# Patient Record
Sex: MALE | Race: WHITE | NOT HISPANIC OR LATINO | Employment: OTHER | URBAN - NONMETROPOLITAN AREA
[De-identification: names, ages, dates, MRNs, and addresses within clinical notes are randomized per-mention and may not be internally consistent; named-entity substitution may affect disease eponyms.]

---

## 2017-01-10 ENCOUNTER — HISTORY (OUTPATIENT)
Dept: FAMILY MEDICINE | Facility: OTHER | Age: 57
End: 2017-01-10

## 2017-01-10 ENCOUNTER — OFFICE VISIT - GICH (OUTPATIENT)
Dept: FAMILY MEDICINE | Facility: OTHER | Age: 57
End: 2017-01-10

## 2017-01-10 DIAGNOSIS — G62.9 POLYNEUROPATHY: ICD-10-CM

## 2017-01-10 DIAGNOSIS — R20.2 PARESTHESIA OF SKIN: ICD-10-CM

## 2017-01-20 ENCOUNTER — AMBULATORY - GICH (OUTPATIENT)
Dept: SCHEDULING | Facility: OTHER | Age: 57
End: 2017-01-20

## 2017-02-10 ENCOUNTER — OFFICE VISIT - GICH (OUTPATIENT)
Dept: FAMILY MEDICINE | Facility: OTHER | Age: 57
End: 2017-02-10

## 2017-02-10 ENCOUNTER — HISTORY (OUTPATIENT)
Dept: FAMILY MEDICINE | Facility: OTHER | Age: 57
End: 2017-02-10

## 2017-02-10 DIAGNOSIS — G62.9 POLYNEUROPATHY: ICD-10-CM

## 2017-03-03 ENCOUNTER — OFFICE VISIT - GICH (OUTPATIENT)
Dept: FAMILY MEDICINE | Facility: OTHER | Age: 57
End: 2017-03-03

## 2017-03-03 ENCOUNTER — HISTORY (OUTPATIENT)
Dept: FAMILY MEDICINE | Facility: OTHER | Age: 57
End: 2017-03-03

## 2017-03-03 DIAGNOSIS — R20.2 PARESTHESIA OF SKIN: ICD-10-CM

## 2017-04-05 ENCOUNTER — COMMUNICATION - GICH (OUTPATIENT)
Dept: FAMILY MEDICINE | Facility: OTHER | Age: 57
End: 2017-04-05

## 2017-04-05 DIAGNOSIS — K21.9 GASTRO-ESOPHAGEAL REFLUX DISEASE WITHOUT ESOPHAGITIS: ICD-10-CM

## 2017-05-11 ENCOUNTER — OFFICE VISIT - GICH (OUTPATIENT)
Dept: FAMILY MEDICINE | Facility: OTHER | Age: 57
End: 2017-05-11

## 2017-05-11 ENCOUNTER — HISTORY (OUTPATIENT)
Dept: FAMILY MEDICINE | Facility: OTHER | Age: 57
End: 2017-05-11

## 2017-05-11 DIAGNOSIS — G60.9 HEREDITARY AND IDIOPATHIC NEUROPATHY: ICD-10-CM

## 2017-05-11 DIAGNOSIS — R20.2 PARESTHESIA OF SKIN: ICD-10-CM

## 2017-05-18 ENCOUNTER — HISTORY (OUTPATIENT)
Dept: FAMILY MEDICINE | Facility: OTHER | Age: 57
End: 2017-05-18

## 2017-05-18 ENCOUNTER — AMBULATORY - GICH (OUTPATIENT)
Dept: FAMILY MEDICINE | Facility: OTHER | Age: 57
End: 2017-05-18

## 2017-05-18 DIAGNOSIS — L72.3 SEBACEOUS CYST: ICD-10-CM

## 2017-05-18 DIAGNOSIS — G62.9 POLYNEUROPATHY: ICD-10-CM

## 2017-05-18 DIAGNOSIS — S01.312A: ICD-10-CM

## 2017-05-19 LAB
6-MONOACETYL MORPHINE - HISTORICAL: ABNORMAL NG/ML
AMPHETAMINE URINE - HISTORICAL: ABNORMAL NG/ML
BARBITURATE URINE - HISTORICAL: ABNORMAL NG/ML
BENZODIAZEPINE URINE - HISTORICAL: ABNORMAL NG/ML
BUPRENORPHRINE URINE - HISTORICAL: ABNORMAL NG/ML
COCAINE METAB URINE - HISTORICAL: ABNORMAL NG/ML
CREAT UR - HISTORICAL: 111 MG/DL
ETHYLGLUCURONIDE URINE - HISTORICAL: ABNORMAL NG/ML
FENTANYL URINE - HISTORICAL: ABNORMAL NG/ML
MASS SPECTROMETRY URINE - HISTORICAL: ABNORMAL
METHADONE URINE - HISTORICAL: ABNORMAL NG/ML
OPIATES URINE - HISTORICAL: ABNORMAL NG/ML
OXYCODONE URINE - HISTORICAL: ABNORMAL NG/ML
PH URINE - HISTORICAL: 6.8
PROPOXYPHENE URINE - HISTORICAL: ABNORMAL NG/ML
THC 50 URINE - HISTORICAL: ABNORMAL NG/ML
TRAMADOL - HISTORICAL: ABNORMAL NG/ML

## 2017-05-30 ENCOUNTER — HISTORY (OUTPATIENT)
Dept: FAMILY MEDICINE | Facility: OTHER | Age: 57
End: 2017-05-30

## 2017-05-30 ENCOUNTER — OFFICE VISIT - GICH (OUTPATIENT)
Dept: FAMILY MEDICINE | Facility: OTHER | Age: 57
End: 2017-05-30

## 2017-05-30 DIAGNOSIS — L72.3 SEBACEOUS CYST: ICD-10-CM

## 2017-06-21 ENCOUNTER — OFFICE VISIT - GICH (OUTPATIENT)
Dept: FAMILY MEDICINE | Facility: OTHER | Age: 57
End: 2017-06-21

## 2017-06-21 ENCOUNTER — HISTORY (OUTPATIENT)
Dept: FAMILY MEDICINE | Facility: OTHER | Age: 57
End: 2017-06-21

## 2017-06-21 DIAGNOSIS — G60.9 HEREDITARY AND IDIOPATHIC NEUROPATHY: ICD-10-CM

## 2017-06-22 ENCOUNTER — HISTORY (OUTPATIENT)
Dept: FAMILY MEDICINE | Facility: OTHER | Age: 57
End: 2017-06-22

## 2017-06-22 ENCOUNTER — OFFICE VISIT - GICH (OUTPATIENT)
Dept: FAMILY MEDICINE | Facility: OTHER | Age: 57
End: 2017-06-22
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
## 2017-06-22 DIAGNOSIS — L21.9 SEBORRHEIC DERMATITIS: ICD-10-CM

## 2017-07-14 ENCOUNTER — AMBULATORY - GICH (OUTPATIENT)
Dept: FAMILY MEDICINE | Facility: OTHER | Age: 57
End: 2017-07-14

## 2017-07-14 DIAGNOSIS — L21.9 SEBORRHEIC DERMATITIS: ICD-10-CM

## 2017-07-31 ENCOUNTER — AMBULATORY - GICH (OUTPATIENT)
Dept: RADIOLOGY | Facility: OTHER | Age: 57
End: 2017-07-31

## 2017-07-31 DIAGNOSIS — G62.9 POLYNEUROPATHY: ICD-10-CM

## 2017-08-01 ENCOUNTER — HOSPITAL ENCOUNTER (OUTPATIENT)
Dept: RADIOLOGY | Facility: OTHER | Age: 57
End: 2017-08-01
Attending: FAMILY MEDICINE

## 2017-08-01 ENCOUNTER — AMBULATORY - GICH (OUTPATIENT)
Dept: RADIOLOGY | Facility: OTHER | Age: 57
End: 2017-08-01

## 2017-08-01 DIAGNOSIS — G62.9 POLYNEUROPATHY: ICD-10-CM

## 2017-08-04 ENCOUNTER — HISTORY (OUTPATIENT)
Dept: FAMILY MEDICINE | Facility: OTHER | Age: 57
End: 2017-08-04

## 2017-08-04 ENCOUNTER — OFFICE VISIT - GICH (OUTPATIENT)
Dept: FAMILY MEDICINE | Facility: OTHER | Age: 57
End: 2017-08-04

## 2017-08-04 DIAGNOSIS — R20.2 PARESTHESIA OF SKIN: ICD-10-CM

## 2017-08-04 DIAGNOSIS — G60.9 HEREDITARY AND IDIOPATHIC NEUROPATHY: ICD-10-CM

## 2017-08-04 DIAGNOSIS — D17.79: ICD-10-CM

## 2017-09-25 ENCOUNTER — HISTORY (OUTPATIENT)
Dept: FAMILY MEDICINE | Facility: OTHER | Age: 57
End: 2017-09-25

## 2017-09-25 ENCOUNTER — OFFICE VISIT - GICH (OUTPATIENT)
Dept: FAMILY MEDICINE | Facility: OTHER | Age: 57
End: 2017-09-25

## 2017-09-25 ENCOUNTER — AMBULATORY - GICH (OUTPATIENT)
Dept: SCHEDULING | Facility: OTHER | Age: 57
End: 2017-09-25

## 2017-09-25 DIAGNOSIS — G60.9 HEREDITARY AND IDIOPATHIC NEUROPATHY: ICD-10-CM

## 2017-10-04 ENCOUNTER — AMBULATORY - GICH (OUTPATIENT)
Dept: FAMILY MEDICINE | Facility: OTHER | Age: 57
End: 2017-10-04

## 2017-10-04 DIAGNOSIS — Z23 ENCOUNTER FOR IMMUNIZATION: ICD-10-CM

## 2017-10-17 ENCOUNTER — AMBULATORY - GICH (OUTPATIENT)
Dept: SCHEDULING | Facility: OTHER | Age: 57
End: 2017-10-17

## 2017-11-02 ENCOUNTER — HISTORY (OUTPATIENT)
Dept: FAMILY MEDICINE | Facility: OTHER | Age: 57
End: 2017-11-02

## 2017-11-02 ENCOUNTER — OFFICE VISIT - GICH (OUTPATIENT)
Dept: FAMILY MEDICINE | Facility: OTHER | Age: 57
End: 2017-11-02

## 2017-11-02 DIAGNOSIS — R20.2 PARESTHESIA OF SKIN: ICD-10-CM

## 2017-11-09 ENCOUNTER — AMBULATORY - GICH (OUTPATIENT)
Dept: SCHEDULING | Facility: OTHER | Age: 57
End: 2017-11-09

## 2017-11-13 LAB
6-MONOACETYL MORPHINE - HISTORICAL: ABNORMAL NG/ML
AMPHETAMINE URINE - HISTORICAL: ABNORMAL NG/ML
BARBITURATE URINE - HISTORICAL: ABNORMAL NG/ML
BENZODIAZEPINE URINE - HISTORICAL: ABNORMAL NG/ML
BUPRENORPHRINE URINE - HISTORICAL: ABNORMAL NG/ML
COCAINE METAB URINE - HISTORICAL: ABNORMAL NG/ML
CREAT UR - HISTORICAL: 139 MG/DL
ETHYLGLUCURONIDE URINE - HISTORICAL: ABNORMAL NG/ML
FENTANYL URINE - HISTORICAL: ABNORMAL NG/ML
MASS SPECTROMETRY URINE - HISTORICAL: ABNORMAL
METHADONE URINE - HISTORICAL: ABNORMAL NG/ML
OPIATES URINE - HISTORICAL: ABNORMAL NG/ML
OXYCODONE URINE - HISTORICAL: ABNORMAL NG/ML
PH URINE - HISTORICAL: 7.4
PROPOXYPHENE URINE - HISTORICAL: ABNORMAL NG/ML
THC 50 URINE - HISTORICAL: ABNORMAL NG/ML
TRAMADOL - HISTORICAL: ABNORMAL NG/ML

## 2017-12-08 ENCOUNTER — AMBULATORY - GICH (OUTPATIENT)
Dept: PHYSICAL THERAPY | Facility: OTHER | Age: 57
End: 2017-12-08

## 2017-12-08 DIAGNOSIS — G90.09 OTHER IDIOPATHIC PERIPHERAL AUTONOMIC NEUROPATHY: ICD-10-CM

## 2017-12-12 ENCOUNTER — OFFICE VISIT - GICH (OUTPATIENT)
Dept: FAMILY MEDICINE | Facility: OTHER | Age: 57
End: 2017-12-12

## 2017-12-12 ENCOUNTER — HISTORY (OUTPATIENT)
Dept: FAMILY MEDICINE | Facility: OTHER | Age: 57
End: 2017-12-12

## 2017-12-12 DIAGNOSIS — G60.9 HEREDITARY AND IDIOPATHIC NEUROPATHY: ICD-10-CM

## 2017-12-15 ENCOUNTER — HOSPITAL ENCOUNTER (OUTPATIENT)
Dept: PHYSICAL THERAPY | Facility: OTHER | Age: 57
Setting detail: THERAPIES SERIES
End: 2017-12-15

## 2017-12-15 DIAGNOSIS — G90.09 OTHER IDIOPATHIC PERIPHERAL AUTONOMIC NEUROPATHY: ICD-10-CM

## 2017-12-20 ENCOUNTER — HOSPITAL ENCOUNTER (OUTPATIENT)
Dept: PHYSICAL THERAPY | Facility: OTHER | Age: 57
Setting detail: THERAPIES SERIES
End: 2017-12-20

## 2017-12-27 NOTE — PROGRESS NOTES
"Patient Information     Patient Name MRN Sex Regulo Velasquez [id number] Male 1960      Progress Notes by Kaiden Chavez MD at 2017  9:15 AM     Author:  Kaiden Chavez MD Service:  (none) Author Type:  Physician     Filed:  2017  9:46 AM Encounter Date:  2017 Status:  Signed     :  Kaiden Chavez MD (Physician)            SUBJECTIVE:    Regulo Roth is a 57 y.o. male who presents for follow up neuropathy    HPI    Feet are \"horrible\" and seem to be getting worse.  He is wondering if I can write a letter stating he cannot stand on the concrete at work.  With getting dressed at his locker, he starts to have pain and before he walks 500 feet to his work site he has trouble walking.  Pain medications wear off by about 9:30 in the morning.  Has met with several neurologists, completed a pain program and is maintained on narcotics.  Has failed neutrontin, gabapentin and TCAs, topical treatment like capscacin and lidoderm patches.  Failed medical THC.  Had a spinal injection about 5 weeks ago, no help at all.  Sleeps only an hour or so at a time at night most of the time.  Applies ice packs occupational therapy the feet.  Often even having the sheets touching his feet will wake him.  Burning type pain.  Hoping to do long term disability.  Has been on a limit of no walking or standing for over 1/2 hour at a time so far.  This has been over 6 months and is not adequate. He is now starting to trip and has a fear of falling at work into a dangerous situation (is around exposed voltage as an ).    No Known Allergies,   Current Outpatient Prescriptions on File Prior to Visit       Medication  Sig Dispense Refill     albuterol HFA (PROAIR HFA) 90 mcg/actuation inhaler Inhale 2 Puffs by mouth every 4 hours if needed. 3 Inhaler 2     aspirin 81 mg tablet Take 1 tablet by mouth once daily with a meal.  0     Calcium carbonate (OYSTERSHELL CALCIUM) 500 mg tablet Take 1 " tablet by mouth once daily.       cholecalciferol (VITAMIN D) 1,000 unit tablet Take 1 tablet by mouth once daily.       CPAP CPAP, heated humidifier, mask, headgear, filters and tubing. For home use. Pressure:  8   Length of Need: 1 unit 0     doxycycline (VIBRAMYCIN) 100 mg capsule 2 cap once 2 capsule 0     esomeprazole (NEXIUM) 40 mg capsule TAKE 1 CAPSULE BY MOUTH DAILY BEFORE A MEAL 90 capsule 3     fluocinonide 0.05 TOPICAL (LIDEX) 0.05 % external solution Apply  topically to affected area(s) 2 times daily. 60 mL 3     fluticasone (50 mcg per actuation) nasal solution (FLONASE) Inhale 2 Sprays into both nostrils once daily. 1 Bottle 12     mometasone 0.1% (ELOCON 0.1% OINTMENT) 0.1 % ointment Apply  topically to affected area(s) once daily. 45 g 3     omega-3 fatty acids-vitamin E (FISH OIL) 1,000 mg cap Take 1 capsule by mouth.  0     oxyCODONE-acetaminophen, 5-325 mg, (PERCOCET) 5-325 mg per tablet Take 1-2 tablets by mouth every 6 hours if needed  for Pain Max acetaminophen dose: 4000mg in 24 hrs. To fill after 10/08/17 120 tablet 0     No current facility-administered medications on file prior to visit.    ,   Past Medical History:     Diagnosis  Date     Asthma      Chronic eczema      GERD (gastroesophageal reflux disease)      Osteoarthritis, knee      Seasonal allergies     and   Past Surgical History:      Procedure  Laterality Date     CERVICAL FUSION      c6-7       COLONOSCOPY DIAGNOSTIC  12/9/13    F/U 2023       DEBRIDEMENT Left 2012    knee  infection following surgery       ESOPHAGOGASTRODUODENOSCOPY  12/9/13    EGD       HERNIA REPAIR       HERNIA REPAIR       KNEE ARTHROSCOPY  1981    acl and medial meniscus repair [Other]       KNEE REPLACEMENT  August 2012    left - became infected require debridment       MULTIPLE SLEEP LATENCY TEST WITHOUT CPAP      uses C PAP       [place] KNEE MANIPULATION  2012     TONSIL AND ADENOIDECTOMY         REVIEW OF SYSTEMS:  Review of Systems   Constitutional:  Negative for chills, fever and weight loss.   Musculoskeletal: Negative for joint pain.   Neurological: Positive for tingling.       OBJECTIVE:  /76  Resp 16    EXAM:   Physical Exam    ASSESSMENT/PLAN:    ICD-10-CM    1. Chronic idiopathic axonal polyneuropathy G60.9 AMB CONSULT TO NEUROLOGY        Plan:  He is progressing in the severity and duration (near constant) of the symptoms.  We have really pursued all known treatment options for this.  Is now also getting more unstable on his feet, so given the fear of falling his current job is now dangerous for him.  I filled out his paperwork stating such.  He has the name of an MD in [facility], Dr. Nieto, who is doing a research protocol in dorsal root ganglion stimulation.  Patient wants to pursue this next, will arrange.  Follow up with me in 5 weeks for narcotic refills.  15/15 minutes counseling.    Kaiden Chavez MD ....................  9/25/2017   9:41 AM

## 2017-12-27 NOTE — PROGRESS NOTES
Patient Information     Patient Name MRN Regulo Davis [id number] Male 1960      Progress Notes by Kaiden Chavez MD at 2017  1:15 PM     Author:  Kaiden Chavez MD Service:  (none) Author Type:  Physician     Filed:  11/3/2017  1:00 PM Encounter Date:  2017 Status:  Signed     :  Kaiden Chavez MD (Physician)            Patient was seen and examined by me as well as Lucretia Roland, MS 3.  See her note for more details.    Kaiden Chavez MD ....................  11/3/2017   12:59 PM

## 2017-12-27 NOTE — PROGRESS NOTES
Patient Information     Patient Name MRN Sex Regulo Velasquez [id number] Male 1960      Progress Notes by Kaiden Chavez MD at 2017 10:30 AM     Author:  Kaiden Chavez MD Service:  (none) Author Type:  Physician     Filed:  2017 10:42 AM Encounter Date:  2017 Status:  Signed     :  Kaiden Chavez MD (Physician)            SUBJECTIVE:    Regulo Roth is a 56 y.o. male who presents for tick bite    HPI    Last night in the shower he found an engorged deer tick on the left scrotum.  Was able to remove it with mouth intact.  No fevers, joint pains and minimal rash in the area.    Also wants a refill on his temovate solution.  Uses this on scalp perhaps 2-3 times a year, resolves the symptoms in a few days.    No Known Allergies,   Current Outpatient Prescriptions on File Prior to Visit       Medication  Sig Dispense Refill     albuterol HFA (PROAIR HFA) 90 mcg/actuation inhaler Inhale 2 Puffs by mouth every 4 hours if needed. 3 Inhaler 2     aspirin 81 mg tablet Take 1 tablet by mouth once daily with a meal.  0     Calcium carbonate (OYSTERSHELL CALCIUM) 500 mg tablet Take 1 tablet by mouth once daily.       cholecalciferol (VITAMIN D) 1,000 unit tablet Take 1 tablet by mouth once daily.       CPAP CPAP, heated humidifier, mask, headgear, filters and tubing. For home use. Pressure:  8   Length of Need: 1 unit 0     esomeprazole (NEXIUM) 40 mg capsule TAKE 1 CAPSULE BY MOUTH DAILY BEFORE A MEAL 90 capsule 3     fluticasone (50 mcg per actuation) nasal solution (FLONASE) Inhale 2 Sprays into both nostrils once daily. 1 Bottle 12     mometasone 0.1% (ELOCON 0.1% OINTMENT) 0.1 % ointment Apply  topically to affected area(s) once daily. 45 g 3     omega-3 fatty acids-vitamin E (FISH OIL) 1,000 mg cap Take 1 capsule by mouth.  0     oxyCODONE-acetaminophen, 5-325 mg, (PERCOCET) 5-325 mg per tablet Take 1-2 tablets by mouth every 6 hours if needed  for Pain Max acetaminophen  dose: 4000mg in 24 hrs. To fill after 7/10/17 120 tablet 0     No current facility-administered medications on file prior to visit.    ,   Past Medical History:     Diagnosis  Date     Asthma      Chronic eczema      GERD (gastroesophageal reflux disease)      Osteoarthritis, knee      Seasonal allergies     and   Past Surgical History:      Procedure  Laterality Date     CERVICAL FUSION      c6-7       COLONOSCOPY DIAGNOSTIC  12/9/13    F/U 2023       DEBRIDEMENT Left 2012    knee  infection following surgery       ESOPHAGOGASTRODUODENOSCOPY  12/9/13    EGD       HERNIA REPAIR       HERNIA REPAIR       KNEE ARTHROSCOPY  1981    acl and medial meniscus repair [Other]       KNEE REPLACEMENT  August 2012    left - became infected require debridment       MULTIPLE SLEEP LATENCY TEST WITHOUT CPAP      uses C PAP       [place] KNEE MANIPULATION  2012     TONSIL AND ADENOIDECTOMY         REVIEW OF SYSTEMS:  Review of Systems   Constitutional: Negative for chills and fever.   Musculoskeletal: Negative for joint pain.   Neurological: Negative for headaches.       OBJECTIVE:  /70  Resp 16    EXAM:   Physical Exam   Constitutional: He is oriented to person, place, and time and well-developed, well-nourished, and in no distress. No distress.   Neurological: He is alert and oriented to person, place, and time.   Skin: He is not diaphoretic.   small red patch, about 1 cm, on left scrotum, no target lesion       ASSESSMENT/PLAN:    ICD-10-CM    1. Tick bite, initial encounter W57.XXXA doxycycline (VIBRAMYCIN) 100 mg capsule   2. Seborrheic dermatitis L21.9 clobetasol 0.05% TOPICAL (TEMOVATE) 0.05 % external solution        Plan:  The tick is with him and is indeed a deer tick and engorged.  Since has no lyme symptoms, can do the 2 caps of doxycycline, but if getting joint pains or fevers, then would have him call for a 3 week course.  Refilled the temovate.    Kaiden Chavez MD ....................  6/22/2017   10:41 AM

## 2017-12-27 NOTE — PROGRESS NOTES
Patient Information     Patient Name MRN Sex Regulo Velasquez [id number] Male 1960      Progress Notes by Julissa Ram at 2017  3:21 PM     Author:  Julissa Ram Service:  (none) Author Type:  (none)     Filed:  2017  3:21 PM Date of Service:  2017  3:21 PM Status:  Signed     :  Julissa Ram            [facility] Protocol    A. Pre-procedure verification complete yes  1-relevant information / documentation available, reviewed and properly matched to the patient; 2-consent accurate and complete, 3-equipment and supplies available    B. Site marking complete Yes  Site marked if not in continuous attendance with patient    C. TIME OUT completed yes  Time Out was conducted just prior to starting procedure to verify the eight required elements: 1-patient identity, 2-consent accurate and complete, 3-position, 4-correct side/site marked (if applicable), 5-procedure, 6-relevant images / results properly labeled and displayed (if applicable), 7-antibiotics / irrigation fluids (if applicable), 8-safety precautions.

## 2017-12-27 NOTE — PROGRESS NOTES
Patient Information     Patient Name MRN Sex Regulo Velasquez [id number] Male 1960      Progress Notes by Julissa Ram at 2017  3:21 PM     Author:  Julissa Ram Service:  (none) Author Type:  (none)     Filed:  2017  3:21 PM Date of Service:  2017  3:21 PM Status:  Signed     :  Julissa Ram            Falls Risk Criteria:    Age 65 and older or under age 4        Sensory deficits    Poor vision    Use of ambulatory aides    Impaired judgment    Unable to walk independently    Meets High Risk criteria for falls:  no

## 2017-12-27 NOTE — PROGRESS NOTES
Patient Information     Patient Name MRN Sex Regulo Velasquez [id number] Male 1960      Progress Notes by Kaiden Chavez MD at 2017  3:00 PM     Author:  Kaiden Chavez MD Service:  (none) Author Type:  Physician     Filed:  2017  3:38 PM Encounter Date:  2017 Status:  Signed     :  aKiden Chavez MD (Physician)            SUBJECTIVE:    Regulo Roth is a 57 y.o. male who presents for follow up pain medications     HPI    He just had spine injections after a spine MRI of lumbar [place].  Says he has a little less burning in the soles of his feet.  No improvement in pains with walking on the floor.  Says this is just a mild improvement.  Keeps socks in the freezer, puts them on at night for 20 minutes to help with the pains.  Has no pains in his back.  I reviewed the MRI with him.    IMPRESSION:     Thoracolumbar Scheuermann's disease.      Epidural lipomatosis, resulting in essentially complete CSF effacement below the level of L1-2.      Diffuse degenerative changes as detailed above. No severe bony spinal stenosis. Variable foraminal narrowing, most pronounced at L3-4.     Electronically Signed By: oNel Roberson on 2017 2:40 PM    Is due for a refill on the narcotics.      No Known Allergies,   Current Outpatient Prescriptions on File Prior to Visit       Medication  Sig Dispense Refill     albuterol HFA (PROAIR HFA) 90 mcg/actuation inhaler Inhale 2 Puffs by mouth every 4 hours if needed. 3 Inhaler 2     aspirin 81 mg tablet Take 1 tablet by mouth once daily with a meal.  0     Calcium carbonate (OYSTERSHELL CALCIUM) 500 mg tablet Take 1 tablet by mouth once daily.       cholecalciferol (VITAMIN D) 1,000 unit tablet Take 1 tablet by mouth once daily.       CPAP CPAP, heated humidifier, mask, headgear, filters and tubing. For home use. Pressure:  8   Length of Need: 1 unit 0     doxycycline (VIBRAMYCIN) 100 mg capsule 2 cap once 2 capsule 0     esomeprazole (NEXIUM) 40  mg capsule TAKE 1 CAPSULE BY MOUTH DAILY BEFORE A MEAL 90 capsule 3     fluocinonide 0.05 TOPICAL (LIDEX) 0.05 % external solution Apply  topically to affected area(s) 2 times daily. 60 mL 3     fluticasone (50 mcg per actuation) nasal solution (FLONASE) Inhale 2 Sprays into both nostrils once daily. 1 Bottle 12     mometasone 0.1% (ELOCON 0.1% OINTMENT) 0.1 % ointment Apply  topically to affected area(s) once daily. 45 g 3     omega-3 fatty acids-vitamin E (FISH OIL) 1,000 mg cap Take 1 capsule by mouth.  0     No current facility-administered medications on file prior to visit.    ,   Past Medical History:     Diagnosis  Date     Asthma      Chronic eczema      GERD (gastroesophageal reflux disease)      Osteoarthritis, knee      Seasonal allergies     and   Past Surgical History:      Procedure  Laterality Date     CERVICAL FUSION      c6-7       COLONOSCOPY DIAGNOSTIC  12/9/13    F/U 2023       DEBRIDEMENT Left 2012    knee  infection following surgery       ESOPHAGOGASTRODUODENOSCOPY  12/9/13    EGD       HERNIA REPAIR       HERNIA REPAIR       KNEE ARTHROSCOPY  1981    acl and medial meniscus repair [Other]       KNEE REPLACEMENT  August 2012    left - became infected require debridment       MULTIPLE SLEEP LATENCY TEST WITHOUT CPAP      uses C PAP       [place] KNEE MANIPULATION  2012     TONSIL AND ADENOIDECTOMY         REVIEW OF SYSTEMS:  Review of Systems   Constitutional: Negative for weight loss.   Musculoskeletal: Negative for back pain.   Skin: Negative for itching and rash.   Neurological: Positive for tingling. Negative for headaches.   Psychiatric/Behavioral: Negative for substance abuse.       OBJECTIVE:  /70  Pulse 62  Temp 97.8  F (36.6  C) (Tympanic)  Wt 135.2 kg (298 lb)  BMI 42.76 kg/m2    EXAM:   Physical Exam   Constitutional: He is oriented to person, place, and time and well-developed, well-nourished, and in no distress. No distress.   HENT:   Head: Normocephalic and atraumatic.    Musculoskeletal:   No lumbar pain on palpation.  Neg straight leg raise.   Neurological: He is alert and oriented to person, place, and time.   Skin: Skin is warm and dry. He is not diaphoretic.   Psychiatric: Memory, affect and judgment normal.       ASSESSMENT/PLAN:    ICD-10-CM    1. Chronic idiopathic axonal polyneuropathy G60.9    2. Paresthesia of bilateral legs R20.2 oxyCODONE-acetaminophen, 5-325 mg, (PERCOCET) 5-325 mg per tablet      DISCONTINUED: oxyCODONE-acetaminophen, 5-325 mg, (PERCOCET) 5-325 mg per tablet      DISCONTINUED: oxyCODONE-acetaminophen, 5-325 mg, (PERCOCET) 5-325 mg per tablet   3. Epidural lipomatosis D17.79         Plan:   reviewed and stable.  Tox screen done in May was stable.  The lumbar findings are not likely to cause true polyneuropathy, but might be a contributing factor.  It is still not a clearly successful injection.  20/25 minutes spent counseling and reviewing his scan with him.    Kaiden Chavez MD ....................  8/4/2017   3:38 PM

## 2017-12-27 NOTE — PROGRESS NOTES
"Patient Information     Patient Name MRN Sex Regulo Velasquez [id number] Male 1960      Progress Notes by Kaiden Chavez MD at 2017  9:30 AM     Author:  Kaiden Chavez MD Service:  (none) Author Type:  Physician     Filed:  2017  9:53 AM Encounter Date:  2017 Status:  Signed     :  Kaiden Chavez MD (Physician)            SUBJECTIVE:    Regulo Roth is a 56 y.o. male who presents for follow up foot neurpoathy    HPI    I have filled out forms for reduced activities.  1/2 hour restirciont of standing or walking, then followed by rest.  Pain remains variable.  Seems to improve with the oxycodone.  Pains get much worse for the first few hours at night before he falls asleep.  Being on his feet makes it all worse.  Pain is in the bottoms of both feet at this time.  More intense than 1 year ago.  Feels \"like walking on hard sharp rocks\".  At night lots of burning pains, worse with sheets touching them or even air blowing on them.  Has had several Neurology consults, most recently at the [facility],  as well as several EMGs showing idiopathic distal axonal polyneuropathy.  Has failed tricyclics, neruontin, lyrica, pain clinic consult.  Serial labs for things like hypothyroidism, B12 deficiency have been normal.  Vit D replacement has not improved the symptoms.    No Known Allergies,   Current Outpatient Prescriptions on File Prior to Visit       Medication  Sig Dispense Refill     albuterol HFA (PROAIR HFA) 90 mcg/actuation inhaler Inhale 2 Puffs by mouth every 4 hours if needed. 3 Inhaler 2     aspirin 81 mg tablet Take 1 tablet by mouth once daily with a meal.  0     Calcium carbonate (OYSTERSHELL CALCIUM) 500 mg tablet Take 1 tablet by mouth once daily.       cholecalciferol (VITAMIN D) 1,000 unit tablet Take 1 tablet by mouth once daily.       clobetasol 0.05% TOPICAL (TEMOVATE) 0.05 % external solution Apply 1 mL topically to affected area(s) 2 times daily. 1 Bottle 3 "     CPAP CPAP, heated humidifier, mask, headgear, filters and tubing. For home use. Pressure:  8   Length of Need: 1 unit 0     esomeprazole (NEXIUM) 40 mg capsule TAKE 1 CAPSULE BY MOUTH DAILY BEFORE A MEAL 90 capsule 3     fluticasone (50 mcg per actuation) nasal solution (FLONASE) Inhale 2 Sprays into both nostrils once daily. 1 Bottle 12     mometasone 0.1% (ELOCON 0.1% OINTMENT) 0.1 % ointment Apply  topically to affected area(s) once daily. 45 g 3     omega-3 fatty acids-vitamin E (FISH OIL) 1,000 mg cap Take 1 capsule by mouth.  0     oxyCODONE-acetaminophen, 5-325 mg, (PERCOCET) 5-325 mg per tablet Take 1-2 tablets by mouth every 6 hours if needed  for Pain Max acetaminophen dose: 4000mg in 24 hrs. To fill after 7/10/17 120 tablet 0     No current facility-administered medications on file prior to visit.    ,   Past Medical History:     Diagnosis  Date     Asthma      Chronic eczema      GERD (gastroesophageal reflux disease)      Osteoarthritis, knee      Seasonal allergies     and   Past Surgical History:      Procedure  Laterality Date     CERVICAL FUSION      c6-7       COLONOSCOPY DIAGNOSTIC  12/9/13    F/U 2023       DEBRIDEMENT Left 2012    knee  infection following surgery       ESOPHAGOGASTRODUODENOSCOPY  12/9/13    EGD       HERNIA REPAIR       HERNIA REPAIR       KNEE ARTHROSCOPY  1981    acl and medial meniscus repair [Other]       KNEE REPLACEMENT  August 2012    left - became infected require debridment       MULTIPLE SLEEP LATENCY TEST WITHOUT CPAP      uses C PAP       [place] KNEE MANIPULATION  2012     TONSIL AND ADENOIDECTOMY         REVIEW OF SYSTEMS:  Review of Systems   Musculoskeletal: Negative for joint pain.   Neurological: Positive for tingling.   Psychiatric/Behavioral: Negative for depression.       OBJECTIVE:  /70  Pulse 64  Resp 16    EXAM:   Physical Exam   Constitutional: He is oriented to person, place, and time and well-developed, well-nourished, and in no distress. No  distress.   Neurological: He is alert and oriented to person, place, and time.   Skin: Skin is warm and dry. He is not diaphoretic.   Psychiatric: Memory, affect and judgment normal.       ASSESSMENT/PLAN:    ICD-10-CM    1. Chronic idiopathic axonal polyneuropathy G60.9         Plan:  This is progressing, which is frustrating for him as well as me in that we have failed to pinpoint the cause.  I continue to treat his symptoms with the above medications, per the recommendations of the neurologists as well as the pain consultant.  The only avenue left would be a [facility] consult or waiting for medical advancements and revisiting the work up in the future.  Realistically, he can no longer stand for more than 1/2 hour at a time.  Then would need to rest for approximately 1/2 hour or so.  20/25 minutes counseling today.    Kaiden Chavez MD ....................  6/21/2017   9:51 AM

## 2017-12-28 NOTE — PROGRESS NOTES
Patient Information     Patient Name MRN Regulo Davis [id number] Male 1960      Progress Notes by Lucretia Roland Med Student at 2017  1:15 PM     Author:  Lucretia Roland Med Student Service:  (none) Author Type:  PHYS- Medical Student     Filed:  11/3/2017  1:00 PM Encounter Date:  2017 Status:  Signed     :  Kaiden Chavez MD (Physician)            SUBJECTIVE:    Regulo Roth is a 57 y.o. male who presents for medication check    HPI    Regulo Roth is a pleasant 57 y.o. Male with a history of chronic idiopathic axonal polyneuropathy diagnosed with EMG who presents for a refill of his percocet. He has tried many different treatments for his neuropathy including medications, laser treatment, pain clinics, and is currently doing spinal injections. His feet are both affected equally causing a burning sensation when walking or sleeping. He does not have weakness associated with the pain and no history of falls.  He is interested in a dorsal root ganglion implant down in [place]. He states the medication has been helping somewhat but nothing seems to be effective at pain relief.     No Known Allergies,   Family History       Problem   Relation Age of Onset     Other  Father      Alzheimer's       Cancer  Sister      Leukemia     ,   Current Outpatient Prescriptions on File Prior to Visit       Medication  Sig Dispense Refill     albuterol HFA (PROAIR HFA) 90 mcg/actuation inhaler Inhale 2 Puffs by mouth every 4 hours if needed. 3 Inhaler 2     aspirin 81 mg tablet Take 1 tablet by mouth once daily with a meal.  0     Calcium carbonate (OYSTERSHELL CALCIUM) 500 mg tablet Take 1 tablet by mouth once daily.       cholecalciferol (VITAMIN D) 1,000 unit tablet Take 1 tablet by mouth once daily.       CPAP CPAP, heated humidifier, mask, headgear, filters and tubing. For home use. Pressure:  8   Length of Need: 1 unit 0     doxycycline (VIBRAMYCIN) 100 mg capsule 2  "cap once 2 capsule 0     esomeprazole (NEXIUM) 40 mg capsule TAKE 1 CAPSULE BY MOUTH DAILY BEFORE A MEAL 90 capsule 3     fluocinonide 0.05 TOPICAL (LIDEX) 0.05 % external solution Apply  topically to affected area(s) 2 times daily. 60 mL 3     fluticasone (50 mcg per actuation) nasal solution (FLONASE) Inhale 2 Sprays into both nostrils once daily. 1 Bottle 12     mometasone 0.1% (ELOCON 0.1% OINTMENT) 0.1 % ointment Apply  topically to affected area(s) once daily. 45 g 3     omega-3 fatty acids-vitamin E (FISH OIL) 1,000 mg cap Take 1 capsule by mouth.  0     No current facility-administered medications on file prior to visit.    ,   Past Medical History:     Diagnosis  Date     Asthma      Chronic eczema      GERD (gastroesophageal reflux disease)      Osteoarthritis, knee      Seasonal allergies     and   Past Surgical History:      Procedure  Laterality Date     CERVICAL FUSION      c6-7       COLONOSCOPY DIAGNOSTIC  12/9/13    F/U 2023       DEBRIDEMENT Left 2012    knee  infection following surgery       ESOPHAGOGASTRODUODENOSCOPY  12/9/13    EGD       HERNIA REPAIR       HERNIA REPAIR       KNEE ARTHROSCOPY  1981    acl and medial meniscus repair [Other]       KNEE REPLACEMENT  August 2012    left - became infected require debridment       MULTIPLE SLEEP LATENCY TEST WITHOUT CPAP      uses C PAP       NM KNEE MANIPULATION  2012     TONSIL AND ADENOIDECTOMY         REVIEW OF SYSTEMS:  Review of Systems   Constitutional: Negative for chills and fever.   Musculoskeletal: Negative for falls.   Neurological: Positive for tingling and sensory change. Negative for focal weakness.       OBJECTIVE:  /70  Pulse 68  Resp 16  Ht 1.778 m (5' 10\")  Wt 136.1 kg (300 lb)  BMI 43.05 kg/m2    EXAM:   Physical Exam   Constitutional: He is oriented to person, place, and time and well-developed, well-nourished, and in no distress. No distress.   Musculoskeletal: Normal range of motion. He exhibits no edema or " tenderness.   Strength is 5/5 bilaterally for lower extremity   Neurological: He is alert and oriented to person, place, and time. Gait normal.   Skin: He is not diaphoretic.       ASSESSMENT/PLAN:    ICD-10-CM    1. Paresthesia of bilateral legs R20.2 oxyCODONE-acetaminophen, 5-325 mg, (PERCOCET) 5-325 mg per tablet      DRUG SCREEN - PAIN MANAGEMENT - TOXASSURE      AMB CONSULT TO PHYSICAL THERAPY      DRUG SCREEN - PAIN MANAGEMENT - TOXASSURE      DISCONTINUED: oxyCODONE-acetaminophen, 5-325 mg, (PERCOCET) 5-325 mg per tablet      DISCONTINUED: oxyCODONE-acetaminophen, 5-325 mg, (PERCOCET) 5-325 mg per tablet        Regulo Roth is a pleasant 57 y.o. Male with a history of chronic idiopathic axonal polyneuropathy diagnosed by EMG who presents for a medication refill of percocet. The pain has been getting worse and he is hoping to find success with a dorsal root ganglion implant.     Plan:    1. Polyneuropathy: Another 3 month prescription for percocet was renewed. Supported his decision to try the new therapy since he has tried every other option available. Also put in a consult for physical therapy.     2. Narcotic use: Compliance urine drug screen today.     Lucretia Roland, Med Student ....................  11/2/2017   3:14 PM        Kaiden Chavez MD ....................  11/3/2017   1:00 PM

## 2017-12-28 NOTE — PROGRESS NOTES
Patient Information     Patient Name MRN Sex Regulo Velasquez [id number] Male 1960      Progress Notes by Josefina Goncalves at 2017  1:50 PM     Author:  Josefina Goncalves Service:  (none) Author Type:  Other Clinical Staff     Filed:  2017  1:51 PM Date of Service:  2017  1:50 PM Status:  Signed     :  Josefina Goncalves (Other Clinical Staff)            Falls Risk Criteria:    Age 65 and older or under age 4        Sensory deficits    Poor vision    Use of ambulatory aides    Impaired judgment    Unable to walk independently    Meets High Risk criteria for falls:  no

## 2017-12-28 NOTE — PROGRESS NOTES
Patient Information     Patient Name MRN Regulo Davis [id number] Male 1960      Progress Notes by Julissa Ram at 2017  3:21 PM     Author:  Julissa Ram Service:  (none) Author Type:  (none)     Filed:  2017  3:21 PM Date of Service:  2017  3:21 PM Status:  Signed     :  Julissa Ram            RECOVERY TIME  Some numbness may be present 2-4 hours after the injection, which could impair your normal driving, reflexes.  You will need someone to drive you home from your exam due to the effects of certain medications.    You may experience numbness and/or relief of your pain for up to 4-6 hours after the injection.  Your usual symptoms may return the night of the procedure and may possible be more severe than usual a day or two following.  Please keep track of your pain over the next several days and report how long the relief lasts to the doctor who referred you for this procedure.    The beneficial effects of the steroids usually require 2 to 3 days to take effect, buy may take as long as 5 to 7 days.  If there is no change in the pain, then investigation can be focused on other possible sources of your pain.  In either case, the information is useful to the doctor who referred you for this procedure.    POSSIBLE SIDE EFFECTS  Facial flushing (redness), occasional low grade fevers of 99.5F or less, hiccups, insomnia, headaches, increased heart rate, abdominal cramping, and/or a bloating feeling are side effects of the steroid medications and will go away 3 to 4 days after the injection.    Diabetic Patients  The steroids you have received may significantly increase your blood sugar levels.  Monitor your blood sugar level closely (4-6 times per day) for a period of 4 days or until your blood sugar level normalizes.  If your blood sugar level elevates significantly or you experience confusion, dizziness, sweating, please notify our primary physician and make him/her aware  that you have received steroids.

## 2017-12-30 NOTE — NURSING NOTE
Patient Information     Patient Name MRN Regulo Davis [id number] Male 1960      Nursing Note by Jayashree Aguilar at 2017 10:30 AM     Author:  Jayashree Aguilar Service:  (none) Author Type:  (none)     Filed:  2017 10:32 AM Encounter Date:  2017 Status:  Signed     :  Jayashree Aguilar            Coming in for a tick bite. Was on for awhijason Aguilar ....................  2017   10:19 AM

## 2017-12-30 NOTE — NURSING NOTE
Patient Information     Patient Name MRN Sex Regulo Velasquez [id number] Male 1960      Nursing Note by Jayashree Aguilar at 2017  9:30 AM     Author:  Jayashree Aguilar Service:  (none) Author Type:  (none)     Filed:  2017  9:35 AM Encounter Date:  2017 Status:  Signed     :  Jayashree Aguilar             wanting medical records regarding Patients condition  Jayashree Aguilar ....................  2017   9:28 AM

## 2017-12-30 NOTE — NURSING NOTE
Patient Information     Patient Name MRN Regulo Davis [id number] Male 1960      Nursing Note by Jayashree Aguilar at 2017  9:15 AM     Author:  Jayashree Aguilar Service:  (none) Author Type:  (none)     Filed:  2017  9:28 AM Encounter Date:  2017 Status:  Signed     :  Jayashree Aguilar            Coming in for paper for work-feet  Jayashree Aguilar ....................  2017   9:22 AM

## 2017-12-30 NOTE — NURSING NOTE
Patient Information     Patient Name MRN Sex Regulo Velasquez [id number] Male 1960      Nursing Note by Jayashree Aguilar at 2017  1:15 PM     Author:  Jayashree Aguilar Service:  (none) Author Type:  (none)     Filed:  2017  1:08 PM Encounter Date:  2017 Status:  Signed     :  Jayashree Aguilar            Coming in for a medication check and referral PT  Jayashree Aguilar ....................  2017   12:58 PM

## 2017-12-30 NOTE — NURSING NOTE
Patient Information     Patient Name MRN Regulo Davis [id number] Male 1960      Nursing Note by Ashley Iniguez at 2017  3:00 PM     Author:  Ashley Iniguez Service:  (none) Author Type:  (none)     Filed:  2017  3:18 PM Encounter Date:  2017 Status:  Signed     :  Ashley Iniguez            Patient is here for a renewal of medication.  Ashley Iniguez LPN 2017 3:03 PM

## 2018-01-02 NOTE — PROGRESS NOTES
Patient Information     Patient Name MRN Sex Regulo Velasquez [id number] Male 1960      Progress Notes by Kaiden Chavez MD at 1/10/2017  3:30 PM     Author:  Kaiden Chavez MD Service:  (none) Author Type:  Physician     Filed:  1/10/2017  3:54 PM Encounter Date:  1/10/2017 Status:  Signed     :  Kaiden Chavez MD (Physician)            SUBJECTIVE:    Regulo Roth is a 56 y.o. male who presents for foot pains    HPI    The last 2 days have been severe.  Having cramps into the arches and has a hard time standing on them now.  Let work earlier.  Had met with another neurology at the [facility], who suggested trying Botox injections a another neurologist.  He has not heard back yet form them.  But basically this remains idiopathic.  He had tired medical marijuana, which only makes him sleepy.  Does not affect the pain at all.  Also has a hard time affording it.    No Known Allergies,   Current Outpatient Prescriptions on File Prior to Visit       Medication  Sig Dispense Refill     albuterol HFA (PROAIR HFA) 90 mcg/actuation inhaler Inhale 2 Puffs by mouth every 4 hours if needed. 3 Inhaler 2     aspirin 81 mg tablet Take 1 tablet by mouth once daily with a meal.  0     Calcium carbonate (OYSTERSHELL CALCIUM) 500 mg tablet Take 1 tablet by mouth once daily.       cholecalciferol (VITAMIN D) 1,000 unit tablet Take 1 tablet by mouth once daily.       clobetasol 0.05% TOPICAL (TEMOVATE) 0.05 % external solution Apply 1 mL topically to affected area(s) 2 times daily. 1 Bottle 3     CPAP CPAP, heated humidifier, mask, headgear, filters and tubing. For home use. Pressure:  8   Length of Need: 1 unit 0     esomeprazole (NEXIUM) 40 mg capsule Take 1 capsule by mouth once daily before a meal. 90 capsule 4     fluticasone (50 mcg per actuation) nasal solution (FLONASE) Inhale 2 Sprays into both nostrils once daily. 1 Bottle 12     omega-3 fatty acids-vitamin E (FISH OIL) 1,000 mg cap Take 1  capsule by mouth.  0     oxyCODONE-acetaminophen, 5-325 mg, (PERCOCET) 5-325 mg per tablet Take 1-2 tablets by mouth every 6 hours if needed  for Pain Max acetaminophen dose: 4000mg in 24 hrs. To fill after 2/2/17 90 tablet 0     No current facility-administered medications on file prior to visit.    ,   Past Medical History     Diagnosis  Date     Asthma      Chronic eczema      GERD (gastroesophageal reflux disease)      Osteoarthritis, knee      Seasonal allergies     and   Past Surgical History       Procedure   Laterality Date     Knee arthroscopy   1981     acl and medial meniscus repair [Other]       Cervical fusion        c6-7       Tonsil and adenoidectomy        Hernia repair        Hernia repair        Knee replacement   August 2012     left - became infected require debridment       Debridement  Left 2012     knee  infection following surgery       Pr knee manipulation   2012     Multiple sleep latency test without cpap        uses C PAP       Colonoscopy diagnostic   12/9/13     F/U 2023       Esophagogastroduodenoscopy   12/9/13     EGD         REVIEW OF SYSTEMS:  ROS    OBJECTIVE:  /70  Resp 16  Wt 136.1 kg (300 lb)  BMI 43.05 kg/m2    EXAM:   Physical Exam   Constitutional: He is oriented to person, place, and time and well-developed, well-nourished, and in no distress. No distress.   Neurological: He is alert and oriented to person, place, and time.   Skin: He is not diaphoretic.   Psychiatric: Memory, affect and judgment normal.       ASSESSMENT/PLAN:    ICD-10-CM    1. Neuropathy (HC) G62.9    2. Paresthesia of bilateral legs R20.2         Plan:  I am really at a loss to explain all of this, as well as to offer treatment options.  He has failed basically everything we have tried.  I will write a letter today stating such.  Continue to follow up with me every 3 months on the narcotic refills.  15/15 minutes counseling today.    Kaiden Chavez MD ....................  1/10/2017   3:54  PM

## 2018-01-03 NOTE — PROGRESS NOTES
"Patient Information     Patient Name MRN Sex Regulo Velasquez [id number] Male 1960      Progress Notes by Kaiden Chavez MD at 3/3/2017  4:00 PM     Author:  Kaiden Chavez MD Service:  (none) Author Type:  Physician     Filed:  3/3/2017  4:08 PM Encounter Date:  3/3/2017 Status:  Signed     :  Kaiden Chavez MD (Physician)            SUBJECTIVE:    Regulo Roth is a 56 y.o. male who presents for follow up pain meds    HPI    Since starting the oxycontin he got a rash in the axilla, and made him sleepy and \"groggy headed\". He stopped them and wants to get back on percocet at 4 tabs a day.  No abuse, had a tox screen in November.    No Known Allergies,   Current Outpatient Prescriptions on File Prior to Visit       Medication  Sig Dispense Refill     albuterol HFA (PROAIR HFA) 90 mcg/actuation inhaler Inhale 2 Puffs by mouth every 4 hours if needed. 3 Inhaler 2     aspirin 81 mg tablet Take 1 tablet by mouth once daily with a meal.  0     Calcium carbonate (OYSTERSHELL CALCIUM) 500 mg tablet Take 1 tablet by mouth once daily.       cholecalciferol (VITAMIN D) 1,000 unit tablet Take 1 tablet by mouth once daily.       clobetasol 0.05% TOPICAL (TEMOVATE) 0.05 % external solution Apply 1 mL topically to affected area(s) 2 times daily. 1 Bottle 3     CPAP CPAP, heated humidifier, mask, headgear, filters and tubing. For home use. Pressure:  8   Length of Need: 1 unit 0     esomeprazole (NEXIUM) 40 mg capsule Take 1 capsule by mouth once daily before a meal. 90 capsule 4     fluticasone (50 mcg per actuation) nasal solution (FLONASE) Inhale 2 Sprays into both nostrils once daily. 1 Bottle 12     omega-3 fatty acids-vitamin E (FISH OIL) 1,000 mg cap Take 1 capsule by mouth.  0     No current facility-administered medications on file prior to visit.    ,   Past Medical History     Diagnosis  Date     Asthma      Chronic eczema      GERD (gastroesophageal reflux disease)      Osteoarthritis, " knee      Seasonal allergies     and   Past Surgical History       Procedure   Laterality Date     Knee arthroscopy   1981     acl and medial meniscus repair [Other]       Cervical fusion        c6-7       Tonsil and adenoidectomy        Hernia repair        Hernia repair        Knee replacement   August 2012     left - became infected require debridment       Debridement  Left 2012     knee  infection following surgery       Pr knee manipulation   2012     Multiple sleep latency test without cpap        uses C PAP       Colonoscopy diagnostic   12/9/13     F/U 2023       Esophagogastroduodenoscopy   12/9/13     EGD         REVIEW OF SYSTEMS:  Review of Systems   Constitutional: Negative for chills and fever.   Musculoskeletal: Negative for joint pain.   Neurological: Positive for tingling.       OBJECTIVE:  /68  Resp 16    EXAM:   Physical Exam   Constitutional: He is oriented to person, place, and time and well-developed, well-nourished, and in no distress. No distress.   Neurological: He is alert and oriented to person, place, and time.   Skin: He is not diaphoretic.   Psychiatric: Memory, affect and judgment normal.       ASSESSMENT/PLAN:    ICD-10-CM    1. Paresthesia of bilateral legs R20.2 oxyCODONE-acetaminophen, 5-325 mg, (PERCOCET) 5-325 mg per tablet      DISCONTINUED: oxyCODONE-acetaminophen, 5-325 mg, (PERCOCET) 5-325 mg per tablet      DISCONTINUED: oxyCODONE-acetaminophen, 5-325 mg, (PERCOCET) 5-325 mg per tablet      DISCONTINUED: oxyCODONE-acetaminophen, 5-325 mg, (PERCOCET) 5-325 mg per tablet        Plan:  He says the pain consultant felt this was more likely an adverse drug reaction than actually secondary to the infection itself of his joint.  I looked up the side effects from vancomycin and rifampin, but neither list neuropathy as side effects.  I did go back to the percocet, 4 daily.  90 days total given.   reviewed and stable.    Kaiden Chavez MD ....................  3/3/2017    4:07 PM

## 2018-01-03 NOTE — ADDENDUM NOTE
Patient Information     Patient Name MRN Regulo Davis [id number] Male 1960      Addendum Note by Kaiden Chavez MD at 1/10/2017  3:58 PM     Author:  Kaiden Chavez MD Service:  (none) Author Type:  Physician     Filed:  1/10/2017  3:58 PM Encounter Date:  1/10/2017 Status:  Signed     :  Kaiden Chavez MD (Physician)       Addended by: KAIDEN CHAVEZ on: 1/10/2017 03:58 PM        Modules accepted: Orders

## 2018-01-03 NOTE — PROGRESS NOTES
Patient Information     Patient Name MRN Sex Regulo Velasquez [id number] Male 1960      Progress Notes by Kaiden Chavez MD at 2/10/2017  3:30 PM     Author:  Kaiden Chavez MD Service:  (none) Author Type:  Physician     Filed:  2/10/2017  3:13 PM Encounter Date:  2/10/2017 Status:  Signed     :  Kaiden Chavez MD (Physician)            SUBJECTIVE:    Regulo Roth is a 56 y.o. male who presents for follow up pain consult    HPI    He had met with the chronic pain specialist in [facility].  She had called me directly and her advise was to add on oxycodone at 10 mg twice daily.  He has researched this and wants to start it.  Continues to get burning in both feet.  Ice helps a little.  Will wake him from sleep.  Latele has been getting a sensation there is a cigarettes touching the lateral calf.  Pain in heels and arches, with slow progression of symptoms over the last several years.  Has been stable on the percocet, without any violations of the contract.  Has no history of chem dep at all.      No Known Allergies,   Current Outpatient Prescriptions on File Prior to Visit       Medication  Sig Dispense Refill     albuterol HFA (PROAIR HFA) 90 mcg/actuation inhaler Inhale 2 Puffs by mouth every 4 hours if needed. 3 Inhaler 2     aspirin 81 mg tablet Take 1 tablet by mouth once daily with a meal.  0     Calcium carbonate (OYSTERSHELL CALCIUM) 500 mg tablet Take 1 tablet by mouth once daily.       cholecalciferol (VITAMIN D) 1,000 unit tablet Take 1 tablet by mouth once daily.       clobetasol 0.05% TOPICAL (TEMOVATE) 0.05 % external solution Apply 1 mL topically to affected area(s) 2 times daily. 1 Bottle 3     CPAP CPAP, heated humidifier, mask, headgear, filters and tubing. For home use. Pressure:  8   Length of Need: 1 unit 0     esomeprazole (NEXIUM) 40 mg capsule Take 1 capsule by mouth once daily before a meal. 90 capsule 4     fluticasone (50 mcg per actuation) nasal solution  (FLONASE) Inhale 2 Sprays into both nostrils once daily. 1 Bottle 12     omega-3 fatty acids-vitamin E (FISH OIL) 1,000 mg cap Take 1 capsule by mouth.  0     oxyCODONE-acetaminophen, 5-325 mg, (PERCOCET) 5-325 mg per tablet Take 1-2 tablets by mouth every 6 hours if needed  for Pain Max acetaminophen dose: 4000mg in 24 hrs. To fill after 2/2/17 90 tablet 0     No current facility-administered medications on file prior to visit.    ,   Past Medical History     Diagnosis  Date     Asthma      Chronic eczema      GERD (gastroesophageal reflux disease)      Osteoarthritis, knee      Seasonal allergies     and   Past Surgical History       Procedure   Laterality Date     Knee arthroscopy   1981     acl and medial meniscus repair [Other]       Cervical fusion        c6-7       Tonsil and adenoidectomy        Hernia repair        Hernia repair        Knee replacement   August 2012     left - became infected require debridment       Debridement  Left 2012     knee  infection following surgery       Pr knee manipulation   2012     Multiple sleep latency test without cpap        uses C PAP       Colonoscopy diagnostic   12/9/13     F/U 2023       Esophagogastroduodenoscopy   12/9/13     EGD         REVIEW OF SYSTEMS:  Review of Systems   Skin: Negative for itching and rash.   Neurological: Positive for tingling.   Psychiatric/Behavioral: Negative for depression, substance abuse and suicidal ideas.       OBJECTIVE:  /64  Resp 16  Wt (!) 136.5 kg (301 lb)  BMI 43.19 kg/m2    EXAM:   Physical Exam   Constitutional: He is oriented to person, place, and time and well-developed, well-nourished, and in no distress. No distress.   Neurological: He is alert and oriented to person, place, and time.   Skin: He is not diaphoretic.   Psychiatric: Memory, affect and judgment normal.       .  ASSESSMENT/PLAN:    ICD-10-CM    1. Neuropathy (HC) G62.9 oxyCODONE (OXYCONTIN) 10 mg SUSTAINED release tablet        Plan:  He has been stable  so far on the percocet and per the pain consult will next add on the oxycontin at low dose, 10 mg twice daily.  30 days given and follow up in 30 days.  If this is helping, will incorporate this into his contract then.  15/15 minutes counseling today.    Kaiden Chavez MD ....................  2/10/2017   3:13 PM

## 2018-01-04 NOTE — TELEPHONE ENCOUNTER
Patient Information     Patient Name MRN Regulo Davis [id number] Male 1960      Telephone Encounter by Diane Parks RN at 2017  9:25 AM     Author:  Daine Parks RN Service:  (none) Author Type:  (none)     Filed:  2017  9:26 AM Encounter Date:  2017 Status:  Signed     :  Diane Parks RN (NURS- Registered Nurse)            Proton Pump Inhibitors    Office visit in the past 12 months or per provider note.    Last visit with BETY DORADO was on: 2017 in [facility]  Next visit with BETY DORADO is on: No future appointment listed with this provider  Next visit with Family Practice is on: No future appointment listed in this department    Max refill for 12 months from last office visit or per provider note.    Prescription refilled per RN Medication Refill Policy.................... Diane Parks ....................  2017   9:25 AM

## 2018-01-05 NOTE — NURSING NOTE
Patient Information     Patient Name MRN Sex Regulo Velasquez [id number] Male 1960      Nursing Note by Jayashree Aguilar at 2017  3:00 PM     Author:  Jayashree Aguilar  Service:  (none) Author Type:  (none)     Filed:  2017  3:16 PM  Encounter Date:  2017 Status:  Addendum     :  Jayashree Aguilar        Related Notes: Original Note by Jayashree Aguilar filed at 2017  3:15 PM            Coming in for a cyst removal from his back, needs to have paperwork done and a letter    Time out was done with patient telling me his name, date of birth, what he is having removed and where.  Jayashree Aguilar ....................  2017   3:13 PM

## 2018-01-05 NOTE — NURSING NOTE
Patient Information     Patient Name MRN Regulo Davis [id number] Male 1960      Nursing Note by Jayashree Aguilar at 2017  2:45 PM     Author:  Jayashree Aguilar Service:  (none) Author Type:  (none)     Filed:  2017  3:09 PM Encounter Date:  2017 Status:  Signed     :  Jayashree Aguilar            Coming in for a medication check. Needs letter  Jayashree Aguilar ....................  2017   2:54 PM

## 2018-01-05 NOTE — NURSING NOTE
Patient Information     Patient Name MRN Regulo Davis [id number] Male 1960      Nursing Note by Jayashree Aguilar at 2017  2:45 PM     Author:  Jayashree Aguilar Service:  (none) Author Type:  (none)     Filed:  2017  3:11 PM Encounter Date:  2017 Status:  Signed     :  Jayashree Aguilar            Coming in for stitch removal  Jayashree Aguilar ....................  2017   2:45 PM

## 2018-01-05 NOTE — PROGRESS NOTES
Patient Information     Patient Name MRN Sex Regulo Velasquez [id number] Male 1960      Progress Notes by Kaiden Chavez MD at 2017  2:45 PM     Author:  Kaiden Chavez MD Service:  (none) Author Type:  Physician     Filed:  2017 10:14 AM Encounter Date:  2017 Status:  Signed     :  Kaiden Chavez MD (Physician)            SUBJECTIVE:    Regulo Roth is a 56 y.o. male who presents for suture removal    HPI    Notes no pain or discharge from the wound.  Has no complaints overall.    No Known Allergies,   Current Outpatient Prescriptions on File Prior to Visit       Medication  Sig Dispense Refill     albuterol HFA (PROAIR HFA) 90 mcg/actuation inhaler Inhale 2 Puffs by mouth every 4 hours if needed. 3 Inhaler 2     aspirin 81 mg tablet Take 1 tablet by mouth once daily with a meal.  0     Calcium carbonate (OYSTERSHELL CALCIUM) 500 mg tablet Take 1 tablet by mouth once daily.       cholecalciferol (VITAMIN D) 1,000 unit tablet Take 1 tablet by mouth once daily.       clobetasol 0.05% TOPICAL (TEMOVATE) 0.05 % external solution Apply 1 mL topically to affected area(s) 2 times daily. 1 Bottle 3     CPAP CPAP, heated humidifier, mask, headgear, filters and tubing. For home use. Pressure:  8   Length of Need: 1 unit 0     esomeprazole (NEXIUM) 40 mg capsule TAKE 1 CAPSULE BY MOUTH DAILY BEFORE A MEAL 90 capsule 3     fluticasone (50 mcg per actuation) nasal solution (FLONASE) Inhale 2 Sprays into both nostrils once daily. 1 Bottle 12     mometasone 0.1% (ELOCON 0.1% OINTMENT) 0.1 % ointment Apply  topically to affected area(s) once daily. 45 g 3     omega-3 fatty acids-vitamin E (FISH OIL) 1,000 mg cap Take 1 capsule by mouth.  0     oxyCODONE-acetaminophen, 5-325 mg, (PERCOCET) 5-325 mg per tablet Take 1-2 tablets by mouth every 6 hours if needed  for Pain Max acetaminophen dose: 4000mg in 24 hrs. To fill after 7/10/17 120 tablet 0     No current facility-administered  medications on file prior to visit.    ,   Past Medical History:     Diagnosis  Date     Asthma      Chronic eczema      GERD (gastroesophageal reflux disease)      Osteoarthritis, knee      Seasonal allergies     and   Past Surgical History:      Procedure  Laterality Date     CERVICAL FUSION      c6-7       COLONOSCOPY DIAGNOSTIC  12/9/13    F/U 2023       DEBRIDEMENT Left 2012    knee  infection following surgery       ESOPHAGOGASTRODUODENOSCOPY  12/9/13    EGD       HERNIA REPAIR       HERNIA REPAIR       KNEE ARTHROSCOPY  1981    acl and medial meniscus repair [Other]       KNEE REPLACEMENT  August 2012    left - became infected require debridment       MULTIPLE SLEEP LATENCY TEST WITHOUT CPAP      uses C PAP       [place] KNEE MANIPULATION  2012     TONSIL AND ADENOIDECTOMY         REVIEW OF SYSTEMS:  ROS    OBJECTIVE:  /68  Resp 16    EXAM:   Physical Exam    Left scapular surgical wound with minimal erythema, no discharge or pain on palpation.  Simple interrupted sutures easily removed.    ASSESSMENT/PLAN:    ICD-10-CM    1. Sebaceous cyst L72.3         Plan:  Follow up as needed.      Kaiden Chavez MD ....................  5/31/2017   10:14 AM

## 2018-01-05 NOTE — PROGRESS NOTES
Patient Information     Patient Name MRN Sex Regulo Velasquez [id number] Male 1960      Progress Notes by Kaiden Chavez MD at 2017  3:00 PM     Author:  Kaiden Chavez MD Service:  (none) Author Type:  Physician     Filed:  2017 10:25 AM Encounter Date:  2017 Status:  Signed     :  Kaiden Chavez MD (Physician)            SUBJECTIVE:    Regulo Roth is a 56 y.o. male who presents for back cyst removal, but new left ear bleeding as well    HPI    Had an appointment for the cyst removal.  adds that he needs an updated form for work release form his lower extremity neuropathy and yesterday with using a qtip he noted blood on it.  No pain in left ear, no hearing loss.  No known trauma.    No Known Allergies,   Current Outpatient Prescriptions on File Prior to Visit       Medication  Sig Dispense Refill     albuterol HFA (PROAIR HFA) 90 mcg/actuation inhaler Inhale 2 Puffs by mouth every 4 hours if needed. 3 Inhaler 2     aspirin 81 mg tablet Take 1 tablet by mouth once daily with a meal.  0     Calcium carbonate (OYSTERSHELL CALCIUM) 500 mg tablet Take 1 tablet by mouth once daily.       cholecalciferol (VITAMIN D) 1,000 unit tablet Take 1 tablet by mouth once daily.       clobetasol 0.05% TOPICAL (TEMOVATE) 0.05 % external solution Apply 1 mL topically to affected area(s) 2 times daily. 1 Bottle 3     CPAP CPAP, heated humidifier, mask, headgear, filters and tubing. For home use. Pressure:  8   Length of Need: 1 unit 0     esomeprazole (NEXIUM) 40 mg capsule TAKE 1 CAPSULE BY MOUTH DAILY BEFORE A MEAL 90 capsule 3     fluticasone (50 mcg per actuation) nasal solution (FLONASE) Inhale 2 Sprays into both nostrils once daily. 1 Bottle 12     mometasone 0.1% (ELOCON 0.1% OINTMENT) 0.1 % ointment Apply  topically to affected area(s) once daily. 45 g 3     omega-3 fatty acids-vitamin E (FISH OIL) 1,000 mg cap Take 1 capsule by mouth.  0     oxyCODONE-acetaminophen, 5-325 mg,  (PERCOCET) 5-325 mg per tablet Take 1-2 tablets by mouth every 6 hours if needed  for Pain Max acetaminophen dose: 4000mg in 24 hrs. To fill after 7/10/17 120 tablet 0     No current facility-administered medications on file prior to visit.    ,   Past Medical History:     Diagnosis  Date     Asthma      Chronic eczema      GERD (gastroesophageal reflux disease)      Osteoarthritis, knee      Seasonal allergies     and   Past Surgical History:      Procedure  Laterality Date     CERVICAL FUSION      c6-7       COLONOSCOPY DIAGNOSTIC  12/9/13    F/U 2023       DEBRIDEMENT Left 2012    knee  infection following surgery       ESOPHAGOGASTRODUODENOSCOPY  12/9/13    EGD       HERNIA REPAIR       HERNIA REPAIR       KNEE ARTHROSCOPY  1981    acl and medial meniscus repair [Other]       KNEE REPLACEMENT  August 2012    left - became infected require debridment       MULTIPLE SLEEP LATENCY TEST WITHOUT CPAP      uses C PAP       [place] KNEE MANIPULATION  2012     TONSIL AND ADENOIDECTOMY         REVIEW OF SYSTEMS:  Review of Systems   Constitutional: Negative for chills and fever.   HENT: Negative for ear discharge, ear pain and tinnitus.    Skin: Negative for itching and rash.   Neurological: Positive for tingling.       OBJECTIVE:  /70  Temp 98.9  F (37.2  C) (Temporal)  Resp 18    EXAM:   Physical Exam   Constitutional: He is well-developed, well-nourished, and in no distress. No distress.   HENT:   Head: Normocephalic and atraumatic.   Right Ear: External ear normal.   Left canal has dried blood, small amount, in anterior aspect.  No khushbu lesions seen.  Tympanic membrane is normal.   Skin: He is not diaphoretic.     Inferior to left scapula, about 7 cm inferior to previous scar form cystectomy, subcutaneous nodule, slightly raised with central pore.  Nodule is about 8 mm in diameter.  Consent signed, infiltrated with 1% lidocaine, and sebum was expelled while this was being infiltrated.  Wide excision carried down  to base of cyst, then excsided out.  Slowed with 3 simple interrupted sutures of 3-0 ethilon.    ASSESSMENT/PLAN:    ICD-10-CM    1. Laceration of ear canal, left, initial encounter S01.312A    2. Sebaceous cyst L72.3    3. Neuropathy (HC) G62.9         Plan:  THe ear has no symptoms at all, so no interventions are needed apart form no swabs into the canal.  Sutures out in 10-14 days.  Wound cares discussed with patient.  Regarding the neuropathy, I printed off the letter from Jan and filled out his paperwork from work, limit to 1/2 standing or walking at a time.      Kaiden Chavez MD ....................  5/19/2017   10:25 AM

## 2018-01-05 NOTE — PROGRESS NOTES
Patient Information     Patient Name MRN Sex Regulo Velasquez [id number] Male 1960      Progress Notes by Kaiden Chavez MD at 2017  2:45 PM     Author:  Kaiden Chavez MD Service:  (none) Author Type:  Physician     Filed:  2017  7:04 AM Encounter Date:  2017 Status:  Signed     :  Kaiden Chavez MD (Physician)            SUBJECTIVE:    Regulo Roth is a 56 y.o. male who presents for follow up neuropathy    HPI    His feet are getting worse.  Not falling, but has a hard time walking from pain.  Has met with several neurologists, completed pain courses and had a completely neg lab work up.  Symptoms felt to be a complication of his knee infection or the antibiotics used to treat it.  Looking now at disability.  [place] makes symptoms worse.  Getting some tingling in his finger pads on both 2-5th ones.    No Known Allergies,   Current Outpatient Prescriptions on File Prior to Visit       Medication  Sig Dispense Refill     albuterol HFA (PROAIR HFA) 90 mcg/actuation inhaler Inhale 2 Puffs by mouth every 4 hours if needed. 3 Inhaler 2     aspirin 81 mg tablet Take 1 tablet by mouth once daily with a meal.  0     Calcium carbonate (OYSTERSHELL CALCIUM) 500 mg tablet Take 1 tablet by mouth once daily.       cholecalciferol (VITAMIN D) 1,000 unit tablet Take 1 tablet by mouth once daily.       clobetasol 0.05% TOPICAL (TEMOVATE) 0.05 % external solution Apply 1 mL topically to affected area(s) 2 times daily. 1 Bottle 3     CPAP CPAP, heated humidifier, mask, headgear, filters and tubing. For home use. Pressure:  8   Length of Need: 1 unit 0     esomeprazole (NEXIUM) 40 mg capsule TAKE 1 CAPSULE BY MOUTH DAILY BEFORE A MEAL 90 capsule 3     fluticasone (50 mcg per actuation) nasal solution (FLONASE) Inhale 2 Sprays into both nostrils once daily. 1 Bottle 12     omega-3 fatty acids-vitamin E (FISH OIL) 1,000 mg cap Take 1 capsule by mouth.  0     No current  facility-administered medications on file prior to visit.    ,   Past Medical History:     Diagnosis  Date     Asthma      Chronic eczema      GERD (gastroesophageal reflux disease)      Osteoarthritis, knee      Seasonal allergies     and   Past Surgical History:      Procedure  Laterality Date     CERVICAL FUSION      c6-7       COLONOSCOPY DIAGNOSTIC  12/9/13    F/U 2023       DEBRIDEMENT Left 2012    knee  infection following surgery       ESOPHAGOGASTRODUODENOSCOPY  12/9/13    EGD       HERNIA REPAIR       HERNIA REPAIR       KNEE ARTHROSCOPY  1981    acl and medial meniscus repair [Other]       KNEE REPLACEMENT  August 2012    left - became infected require debridment       MULTIPLE SLEEP LATENCY TEST WITHOUT CPAP      uses C PAP       [place] KNEE MANIPULATION  2012     TONSIL AND ADENOIDECTOMY         REVIEW OF SYSTEMS:  Review of Systems   Musculoskeletal: Negative for joint pain.   Neurological: Positive for tingling. Negative for focal weakness.   Psychiatric/Behavioral: Negative for substance abuse.       OBJECTIVE:  /70  Resp 16  Wt 135.6 kg (299 lb)  BMI 42.9 kg/m2    EXAM:   Physical Exam   Constitutional: He is oriented to person, place, and time and well-developed, well-nourished, and in no distress. No distress.   Neurological: He is alert and oriented to person, place, and time.   Sensation in fingers normal to light touch, reduced to sharp.   Skin: He is not diaphoretic.   Psychiatric: Memory, affect and judgment normal.       ASSESSMENT/PLAN:    ICD-10-CM    1. Paresthesia of bilateral legs R20.2 oxyCODONE-acetaminophen, 5-325 mg, (PERCOCET) 5-325 mg per tablet      DRUG SCREEN - PAIN MANAGEMENT - TOXASSURE      DISCONTINUED: oxyCODONE-acetaminophen, 5-325 mg, (PERCOCET) 5-325 mg per tablet      DISCONTINUED: oxyCODONE-acetaminophen, 5-325 mg, (PERCOCET) 5-325 mg per tablet   2. Idiopathic neuropathy G60.9         Plan:  Refilled his pain medications for 3 more months.   reviewed and  stable.  I filled out preliminary forms for him to begin disability application.    Kaiden Chavez MD ....................  5/11/2017   3:16 PM

## 2018-01-23 ENCOUNTER — HISTORY (OUTPATIENT)
Dept: FAMILY MEDICINE | Facility: OTHER | Age: 58
End: 2018-01-23

## 2018-01-23 ENCOUNTER — OFFICE VISIT - GICH (OUTPATIENT)
Dept: FAMILY MEDICINE | Facility: OTHER | Age: 58
End: 2018-01-23

## 2018-01-23 DIAGNOSIS — K21.9 GASTRO-ESOPHAGEAL REFLUX DISEASE WITHOUT ESOPHAGITIS: ICD-10-CM

## 2018-01-23 DIAGNOSIS — R20.2 PARESTHESIA OF SKIN: ICD-10-CM

## 2018-01-25 VITALS
BODY MASS INDEX: 43.05 KG/M2 | SYSTOLIC BLOOD PRESSURE: 128 MMHG | DIASTOLIC BLOOD PRESSURE: 68 MMHG | RESPIRATION RATE: 16 BRPM | RESPIRATION RATE: 16 BRPM | DIASTOLIC BLOOD PRESSURE: 70 MMHG | DIASTOLIC BLOOD PRESSURE: 70 MMHG | SYSTOLIC BLOOD PRESSURE: 130 MMHG | WEIGHT: 299 LBS | RESPIRATION RATE: 16 BRPM | SYSTOLIC BLOOD PRESSURE: 130 MMHG | SYSTOLIC BLOOD PRESSURE: 130 MMHG | DIASTOLIC BLOOD PRESSURE: 70 MMHG | WEIGHT: 300 LBS | BODY MASS INDEX: 42.9 KG/M2 | RESPIRATION RATE: 16 BRPM

## 2018-01-25 VITALS
WEIGHT: 301 LBS | RESPIRATION RATE: 16 BRPM | DIASTOLIC BLOOD PRESSURE: 64 MMHG | BODY MASS INDEX: 43.19 KG/M2 | SYSTOLIC BLOOD PRESSURE: 122 MMHG

## 2018-01-25 VITALS
RESPIRATION RATE: 16 BRPM | HEART RATE: 62 BPM | RESPIRATION RATE: 18 BRPM | DIASTOLIC BLOOD PRESSURE: 76 MMHG | SYSTOLIC BLOOD PRESSURE: 122 MMHG | RESPIRATION RATE: 16 BRPM | BODY MASS INDEX: 42.76 KG/M2 | DIASTOLIC BLOOD PRESSURE: 70 MMHG | DIASTOLIC BLOOD PRESSURE: 70 MMHG | HEART RATE: 64 BPM | WEIGHT: 298 LBS | SYSTOLIC BLOOD PRESSURE: 128 MMHG | SYSTOLIC BLOOD PRESSURE: 132 MMHG | DIASTOLIC BLOOD PRESSURE: 68 MMHG | TEMPERATURE: 98.9 F | SYSTOLIC BLOOD PRESSURE: 138 MMHG | DIASTOLIC BLOOD PRESSURE: 70 MMHG | RESPIRATION RATE: 16 BRPM | SYSTOLIC BLOOD PRESSURE: 128 MMHG | TEMPERATURE: 97.8 F

## 2018-01-25 VITALS
HEART RATE: 68 BPM | WEIGHT: 300 LBS | DIASTOLIC BLOOD PRESSURE: 70 MMHG | RESPIRATION RATE: 16 BRPM | HEIGHT: 70 IN | SYSTOLIC BLOOD PRESSURE: 128 MMHG | BODY MASS INDEX: 42.95 KG/M2

## 2018-02-02 ENCOUNTER — DOCUMENTATION ONLY (OUTPATIENT)
Dept: FAMILY MEDICINE | Facility: OTHER | Age: 58
End: 2018-02-02

## 2018-02-02 PROBLEM — G60.9 CHRONIC IDIOPATHIC AXONAL POLYNEUROPATHY: Status: ACTIVE | Noted: 2017-06-21

## 2018-02-02 PROBLEM — D17.79 EPIDURAL LIPOMATOSIS: Status: ACTIVE | Noted: 2017-08-04

## 2018-02-02 PROBLEM — E88.2 EPIDURAL LIPOMATOSIS: Status: ACTIVE | Noted: 2017-08-04

## 2018-02-02 RX ORDER — FLUOCINONIDE TOPICAL SOLUTION USP, 0.05% 0.5 MG/ML
SOLUTION TOPICAL
COMMUNITY
Start: 2017-07-14 | End: 2022-07-25

## 2018-02-02 RX ORDER — MOMETASONE FUROATE 1 MG/G
OINTMENT TOPICAL
COMMUNITY
Start: 2017-05-11 | End: 2020-02-03

## 2018-02-02 RX ORDER — DOXYCYCLINE 100 MG/1
CAPSULE ORAL
COMMUNITY
Start: 2017-06-22 | End: 2018-10-05

## 2018-02-02 RX ORDER — CHLORAL HYDRATE 500 MG
1 CAPSULE ORAL
COMMUNITY
Start: 2012-11-14 | End: 2019-01-04

## 2018-02-02 RX ORDER — ESOMEPRAZOLE MAGNESIUM 40 MG/1
40 CAPSULE, DELAYED RELEASE ORAL DAILY
COMMUNITY
Start: 2017-04-06 | End: 2018-12-17

## 2018-02-02 RX ORDER — OXYCODONE AND ACETAMINOPHEN 5; 325 MG/1; MG/1
1-2 TABLET ORAL EVERY 6 HOURS PRN
COMMUNITY
Start: 2017-11-02 | End: 2018-04-17

## 2018-02-02 RX ORDER — FLUTICASONE PROPIONATE 50 MCG
2 SPRAY, SUSPENSION (ML) NASAL DAILY
COMMUNITY
Start: 2016-04-28 | End: 2023-04-14

## 2018-02-02 RX ORDER — ALBUTEROL SULFATE 90 UG/1
2 AEROSOL, METERED RESPIRATORY (INHALATION) EVERY 4 HOURS PRN
COMMUNITY
Start: 2016-11-25 | End: 2020-02-25

## 2018-02-09 VITALS
WEIGHT: 302.6 LBS | HEIGHT: 70 IN | HEART RATE: 84 BPM | DIASTOLIC BLOOD PRESSURE: 66 MMHG | SYSTOLIC BLOOD PRESSURE: 122 MMHG | BODY MASS INDEX: 43.32 KG/M2

## 2018-02-09 VITALS — BODY MASS INDEX: 42.99 KG/M2 | DIASTOLIC BLOOD PRESSURE: 72 MMHG | WEIGHT: 299.6 LBS | SYSTOLIC BLOOD PRESSURE: 130 MMHG

## 2018-02-12 NOTE — PROGRESS NOTES
"Patient Information     Patient Name MRN Sex Regulo Velasquez [id number] Male 1960      Progress Notes by Kaiden Chavez MD at 2017  9:30 AM     Author:  Kaiden Chavez MD Service:  (none) Author Type:  Physician     Filed:  2017  9:56 AM Encounter Date:  2017 Status:  Signed     :  Kaiden Chavez MD (Physician)            SUBJECTIVE:    Regulo Roth is a 57 y.o. male who presents for follow up on neurpoathy    HPI    He is getting laid off in a few days.  Was on restrictions for under 1/2 hour of walking or standing.  They required him to see a neurologist in [place].  He has the notes from him.  Has not been at work sine the end of Sept.  He is now applying for social security disability.  He had an EMG in , with axonal polyneuropathy in sensory and autonomic fibers.  Pain is progressing I the soles of his feet mostly.  Symptoms are especially bad at night time.  Sharp stabbing pains, had been brief, but now lingering.  Feet feel \"like they are in a flame\".  Even the sheets at night are bothersome.    4 weeks ago he met with Dr. Nieto, who is a surgeon.  Looking into getting a spinal implant.  Had an epidural there, without any relief.  Is set up here for physical therapy and a psychological evaluation, followed by a follow up with the surgeon.    No Known Allergies,   Current Outpatient Prescriptions on File Prior to Visit       Medication  Sig Dispense Refill     albuterol HFA (PROAIR HFA) 90 mcg/actuation inhaler Inhale 2 Puffs by mouth every 4 hours if needed. 3 Inhaler 2     aspirin 81 mg tablet Take 1 tablet by mouth once daily with a meal.  0     Calcium carbonate (OYSTERSHELL CALCIUM) 500 mg tablet Take 1 tablet by mouth once daily.       cholecalciferol (VITAMIN D) 1,000 unit tablet Take 1 tablet by mouth once daily.       CPAP CPAP, heated humidifier, mask, headgear, filters and tubing. For home use. Pressure:  8   Length of Need: 1 unit 0     " "doxycycline (VIBRAMYCIN) 100 mg capsule 2 cap once 2 capsule 0     esomeprazole (NEXIUM) 40 mg capsule TAKE 1 CAPSULE BY MOUTH DAILY BEFORE A MEAL 90 capsule 3     fluocinonide 0.05 TOPICAL (LIDEX) 0.05 % external solution Apply  topically to affected area(s) 2 times daily. 60 mL 3     fluticasone (50 mcg per actuation) nasal solution (FLONASE) Inhale 2 Sprays into both nostrils once daily. 1 Bottle 12     mometasone 0.1% (ELOCON 0.1% OINTMENT) 0.1 % ointment Apply  topically to affected area(s) once daily. 45 g 3     omega-3 fatty acids-vitamin E (FISH OIL) 1,000 mg cap Take 1 capsule by mouth.  0     oxyCODONE-acetaminophen, 5-325 mg, (PERCOCET) 5-325 mg per tablet Take 1-2 tablets by mouth every 6 hours if needed  for Pain Max acetaminophen dose: 4000mg in 24 hrs. To fill after 1/05/18 120 tablet 0     No current facility-administered medications on file prior to visit.    ,   Past Medical History:     Diagnosis  Date     Asthma      Chronic eczema      GERD (gastroesophageal reflux disease)      Osteoarthritis, knee      Seasonal allergies     and   Past Surgical History:      Procedure  Laterality Date     CERVICAL FUSION      c6-7       COLONOSCOPY DIAGNOSTIC  12/9/13    F/U 2023       DEBRIDEMENT Left 2012    knee  infection following surgery       ESOPHAGOGASTRODUODENOSCOPY  12/9/13    EGD       HERNIA REPAIR       HERNIA REPAIR       KNEE ARTHROSCOPY  1981    acl and medial meniscus repair [Other]       KNEE REPLACEMENT  August 2012    left - became infected require debridment       MULTIPLE SLEEP LATENCY TEST WITHOUT CPAP      uses C PAP       [place] KNEE MANIPULATION  2012     TONSIL AND ADENOIDECTOMY         REVIEW OF SYSTEMS:  ROS    OBJECTIVE:  /66 (Cuff Site: Right Arm, Position: Sitting, Cuff Size: Adult Large)  Pulse 84  Ht 1.778 m (5' 10\")  Wt (!) 137.3 kg (302 lb 9.6 oz)  BMI 43.42 kg/m2    EXAM:   Physical Exam   Constitutional: He is oriented to person, place, and time and well-developed, " well-nourished, and in no distress. No distress.   Neurological: He is alert and oriented to person, place, and time.   Skin: Skin is warm and dry. He is not diaphoretic.   Psychiatric: Memory, affect and judgment normal.       ASSESSMENT/PLAN:    ICD-10-CM    1. 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  I reviewed the independent medical exam report from Dr. Eldridge.  Will scan this report into the EHR.  This neurologist agreed with all of the current findings and agreed he cannot continue to work.  25 minutes with him today, over 1/2 in reviewing the notes and coordination of cares.    Kaiden Chavez MD ....................  12/12/2017   9:55 AM

## 2018-02-12 NOTE — DISCHARGE SUMMARY
"Patient Information     Patient Name MRN Sex Regulo Velasquez [id number] Male 1960      Discharge Summaries by Nayla Zepeda PT at 2017 11:05 AM     Author:  Nayla Zepeda PT Service:  (none) Author Type:  PT- Physical Therapist     Filed:  2017 11:43 AM Date of Service:  2017 11:05 AM Status:  Signed     :  Nayla Zepeda PT (PT- Physical Therapist)            [facility]  Outpatient PT - Discharge Summary       Date of Service: 2017   Visit #: 2 (of 10 for PSFS)   Patient Name: Regulo Roth   YOB: 1960   Referring MD/Provider: Jorge L Nieto MD   Diagnosis: idiopathic peripheral autonomic neuropathy  Treatment Diagnosis: impaired balance and gait, pain secondary to neuropathy   Insurance:  [employer]  Start of Care Date: Start of Service: 12/15/17  Certification Dates: From: Start of Service: 12/15/17  Re-Cert Due: Medicare/MA Re-Cert Due: 18    Precautions: fall risk,     Subjective    .     Pain:    Patient reports current level of pain of  9 = Very Severe Pain, (Dreadful, Overwhelming, Horrible, Agonizing) /10 (0= no pain, 10= worst pain possible).    Reports he was up early and so is behind on his pain meds. States he was on his feet a lot the last few days, is really paying for it with increased pain today. He reports he has used tennis balls for massage to his feet but cannot do this when he is in this much pain.       Objective  Antalgic gait B with shortened step lengths     Today's Intervention:    Long sit gastroc stretch with towel 3x20\" B    -Regulo had multiple instances of sharp shooting pains causing him to jump with facial grimacing, would continue activity immediately without cues     Reviewed LE strengthening TE's to maintain muscle tone:    -seated LAQ's with weights, cues for hold at top and eccentric control   -standing and prone HS curls with weights, again emphasis on eccentric control "    -SLR, hip abduction, prone hip extension     Instructed on foam roller use for gastroc and HS      Home Exercise Program: will update as needed     Assessment    Therapist Assessment:  Pain significantly limits activities 12/20/2017, antalgic gait and limited tolerance to exercises       Functional Impairment(s): Decreased Activity Tolerance:  cannot stand for long periods of time due to pain      Prior Function:   Not Limited  Difficult Transfers:  impaired due to pain, uses his arms when able      Prior Function:  Not Limited  Difficulty with Ambulation:  pain with weight bearing      Prior Function:  Pain Free  Balance:  increased frequency of falls due to pain and impaired balance     Prior Function:   Independent and No Deficits  Sleeping Tolerance:  Difficulty Falling Asleep:  Yes - due to pain  Sleep Disruption, wakes 5 times per night.     Prior Function:  Pain Free and Independent    Physical Impairment(s):       MUSCULOSKELETAL:  Balance Deficits and Pain       NEURO/SENSORY:  Hypersensitivity and Impaired Sensation     Patient Specific Functional and Pain Scales (PSFS): 12/20/2017    Clinician Instructions: Complete after the history and before the exam.    Initial Assessment: We want to know what 3 activities in your life you are unable to perform, or are having the most difficulty performing, as a result of your chief problem. Please list and score at least 3 activities that you are unable to perform, or having the most difficulty performing, because of your chief problem.   Patient Specific Activity Scoring Scheme (score one number for each activity):   Activity Score (0-10)  0= Unable to perform activity  10= Able to perform activity at same level as before injury or problem   1. balance 3/10   2. Walking longer than 5 minutes 3/10   3. Standing longer than 5 minutes  3/10   4. Riding in car to appointments  4/10   Totals:  13/40 = 33 % ability which relates to 67% impairment    Patient verbally  states that they understand that the information they have provided above is current and complete to the best of their knowledge.    Patient Specific Functional Scale Modifier Scale Conversion: (patient's modifier that correlates with pt's score on PSFS): 4-CL (60% Impaired).    G codes and Modifier taken from patient completing the PSFS:   Initial Primary G Code and Modifier:    Per the Patient's intake and/or assessment the Primary G Code is: Mobility .   The Patient's Impairment, Limitation or Restriction Modifier would be best described as: CL - 60% - 80% Impairment.   Goal Primary G Code and Modifier:    The Patient's G Code Goal would be: Mobility    The Patient's Impairment, Limitation or Restriction Modifier goal would be best described as: CK - 40% - 60% Impairment.   Discharge Primary G Code and Modifier:    The Patient's status upon Discharge is Mobility    The Patient's Impairment, Limitation or Restriction Modifier would be best described as CL - 60% - 80% Impairment       Patient Specific Functional and Pain Scales (PSFS): 12/15/2017    Clinician Instructions: Complete after the history and before the exam.    Initial Assessment: We want to know what 3 activities in your life you are unable to perform, or are having the most difficulty performing, as a result of your chief problem. Please list and score at least 3 activities that you are unable to perform, or having the most difficulty performing, because of your chief problem.   Patient Specific Activity Scoring Scheme (score one number for each activity):   Activity Score (0-10)  0= Unable to perform activity  10= Able to perform activity at same level as before injury or problem   1. balance 3/10   2. Walking longer than 5 minutes 3/10   3. Standing longer than 5 minutes 3/10   4. Riding in car to appointments  4/10   Totals:  13/40 = 33 % ability which relates to 67% impairment    Patient verbally states that they understand that the  information they have provided above is current and complete to the best of their knowledge.    Patient Specific Functional Scale Modifier Scale Conversion: (patient's modifier that correlates with pt's score on PSFS): 4-CL (60% Impaired).    G codes and Modifier taken from patient completing the PSFS:   Initial Primary G Code and Modifier:    Per the Patient's intake and/or assessment the Primary G Code is: Mobility .   The Patient's Impairment, Limitation or Restriction Modifier would be best described as: CL - 60% - 80% Impairment.   Goal Primary G Code and Modifier:    The Patient's G Code Goal would be: Mobility    The Patient's Impairment, Limitation or Restriction Modifier goal would be best described as: CK - 40% - 60% Impairment.     Goals:  Functional goals:   Patient is to be independent their in Home exercise program in 2 weeks. Goal met, Regulo is familiar with home progam he was educated on 12/20/2017   Regulo will demonstrate ability to perform SLS on each LE for at least 8 seconds for improved proprioception within 4 weeks. Not met due to pain 12/20/2017   Regulo will demonstrate ability to obtain and maintain tandem stance with each LE leading for at least 10 seconds for improved proprioception and balance within 4 weeks. Not met due to pain 12/20/2017   Regulo will demonstrate improved B DF to at least 8 degrees for improved ability to maintain flat foot contact on floor with gait within 4 weeks. Not met due to pain 12/20/2017     Patient participated in goal selection and understand(s) the plan of care: Yes   Patient Potential for Achieving Desired Outcome:  Fair, Poor due to chronic neuropathy that has failed other interventions.     Response to Intervention:  Regulo reported stretch was feeling good       Plan    Regulo will be discharged with recommendations for HEP and modalities in place.     Thank you for your referral to [facility].  Please call with any questions,  concerns or comments.  [phone]    The signature, of the referring medical provider, on this document indicates certification of the above prescribed plan of care and is medically necessary.

## 2018-02-12 NOTE — INITIAL ASSESSMENTS
Patient Information     Patient Name MRN Sex Regulo Velasquez [id number] Male 1960      Initial Assessments by Nayla Zepeda PT at 12/15/2017 10:31 AM     Author:  Nayla Zepeda PT Service:  (none) Author Type:  PT- Physical Therapist     Filed:  12/15/2017 12:02 PM Date of Service:  12/15/2017 10:31 AM Status:  Signed     :  Nayla Zepeda PT (PT- Physical Therapist)            [facility]  Outpatient PT - Initial Balance and Falls Evaluation      Date of Service: 12/15/2017   Visit #: 1 (of 10 for PSFS)   Patient Name: Regulo Roth   YOB: 1960   Referring MD/Provider: Jorge L Nieto MD   Diagnosis: idiopathic peripheral autonomic neuropathy  Treatment Diagnosis: impaired balance and gait, pain secondary to neuropathy   Insurance:  [employer]  Start of Care Date: Start of Service: 12/15/17  Certification Dates: From: Start of Service: 12/15/17  Re-Cert Due: Medicare/MA Re-Cert Due: 18    Precautions: fall risk,     Subjective      Current Complaints/Reason for Referral: Regulo Roth  is a 57 y.o. male  who comes to physical therapy today with a chief complaint of foot pain secondary to neuropathy.  Reports in  he had a knee replacement and it got infected, had to have two subsequent surgeries to debride and was on high doses of antibiotics, since then his feet started to burn-has progressively gotten worse since then. Reports he is being laid off as he works as an  and he can be on his feet for less than 30 minutes at a time due to pain, has had balance issues on uneven terrain at work as well as with ladders-is unsafe to be working around electricity for himself and others.     Will be scheduled to have a dorsal root ganglion implant in 2018.     Date of onset: 2012   Gradual: x     Prior Hospitalization: none recent     Is planning on having an electrical implant. Reports he has had two epidurals. Will  need a psychological eval before surgery.     Patient reports the following symptoms:   Decreased Balance X  Gait Abnormalities X   Tingling/Numbness X    Fall Risk Screening:  Have you fallen 2 more more times in the past year? 4+  Have you fallen and had an injury in the past year? No injuries, uneven terrain, ladders.     -See Marmolejo balance test below     Pain:    Patient reports current level of pain of  6 = Severe Pain, (Miserable, Gnawing, Fierce, Piercing),  7 = Severe Pain, (Miserable, Gnawing, Fierce, Piercing) and 10 = Worst Possible Pain, (Unbearable, Devastating, Crushing, Excruciating) /10 (0= no pain, 10= worst pain possible).  Patient reports pain is in the following location: B feet from heels to arch, then again from balls of feet towards toes along plantar surface and then onto dorsal surface of toes.  Patient reports pain has gotten as high as a 10 = Worst Possible Pain, (Unbearable, Devastating, Crushing, Excruciating) /10 and as low as a  6 = Severe Pain, (Miserable, Gnawing, Fierce, Piercing) /10 in the last week.  Patient reports symptoms of pain increase with: weight bearing, putting his feet up and resting causes cramps and shooting pains that have gotten worse over the last few years.  Patient reports symptoms of pain decrease with: pain medication helps somewhat.      Takes percocet about every 5 hours, reports he could not handle anything stronger as it made him sleep.     Prior Level of Function: Reports prior to 2012 he had no foot pain and was able to work full time as an  without difficulties.     Preadmission Functional Mobility: Independent    Functional Limitations/Difficulty: Patient reports difficulty with tasks such as: walking, standing, stairs, balance, sleeping, showering, driving.    Subjective rating of current functional limitations:  Functional Activity No Difficulty Mild Difficulty Mod. Difficulty Severe Difficulty   Sleeping      x   Walking 30 Min      x    Standing 30 Min    x   Stairs- 10 steps     x x   Sitting/Driving    1  Hour   x    Work      x     Putting feet up or resting he reports shooting pain that is getting worse and staying longer with causing cramps.     Social History:   Patient reports that they currently live in a house.  Patient states they live with his wife.  States friends and family are available to help.  States the home is multiple levels with railings on some stairs and not on others  Bathroom set up is as follows: basement bathroom is a walk in shower with grab bars and a bench, upstairs bathroom is a step over tub without grab bars.   Additional Assistive Devices include: none  Patient reports 12/15/2017 is his last official day of work due to being laid off.     Falls: Pt reports 4+ falls.     Frequency of falls: every few months   Injuries Associated with the Falls: none     Past Medical History: See patient s PMH in electronic medical record.  This was reviewed with patient.  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   Patient reports the following surgeries in the past: left TKA, 2 subsequent debridements due to infection, C6-7 fusion, history of hernia repair.     Previous Treatment: Regulo reports he has undergone therapy for his knee and feet in 2012, has had 2 epidurals for his feet that did not help, has attended pain clinic  and has tried laser treatment without improvement, has tried many types of shoe wear.     Latex Allergy: No     Medications: See patient s list of medications in electronic medical record.  These were reviewed with patient.      MRI from July 2018:  Results: IMPRESSION:     Thoracolumbar Scheuermann's disease.      Epidural lipomatosis, resulting in essentially complete CSF effacement below the level of L1-2.      Diffuse degenerative changes as detailed above. No severe bony spinal stenosis. Variable foraminal narrowing, most pronounced at L3-4.  ?     Goals: Following physical therapy intervention, the patient hopes to be able to: be cleared for surgery in January.       Were cultural / age or other special adaptations needed? No      Patient is a vulnerable adult: No      Patient is aware of diagnosis: Yes      Risks and benefits explained: Yes    Objective    Items left blank indicate that the test was inappropriate or not meaningful at the time of evaluation and therefore not performed.    Observation: Patient comes into physical therapy clinic today with no assistive device.      Cognition:  Oriented to Person, Place, and Time: yes    Occulomotor:    Smooth Pursuit: WNL    Saccades: WNL    Convergence/divercence: WNL     Posture:    Seated Posture: mildly increased thoracic kyphosis with rounded shoulders and forward head   Standing Posture: wide base of support with upright stance     Gait/Stairs: Patient walks with wide base of support, slow pace, noted to facially grimace during gait. Appears to have compensatory B trendelenburg due to pain in feet as he comes out of stance early on each side.     ROM / Strength:              Strength: Manual Muscle Testing  All measurements are out of a highest possible score of 5 (normal muscle strength), and a lowest possible score of 0 (no palpable contraction).          Strength Left  Right   Shld. Flexion       4+/5      4+ /5   Shld. Abduction     4+ /5     4+ /5  "  External Rotation     4+ /5      4+/5   Internal Rotation      5/5     5/5   Elbow Extension     5 /5     5 /5   Elbow Flexion     5/5     5 /5   Supination    5  /5     5 /5   Pronation   5   /5      5/5             Norms Left  Right Strength Left  Right   Hip Flexion 120    Hip Flexion 5/5 5/5   Knee Extension 0 0 0 Knee Extension 5/5 5/5   Knee Flexion 135 120 120 Knee Flexion 5/5 5/5   Dorsiflexion 20 5 5 Dorsiflexion 5/5 5/5     Coordination: WNL    Sensory Assessment:    Dermatomes: impaired along plantar surface of B feet    Tonicity/Reflexes: WNL    Palpation: Patient has significant pain along B plantar surface of feet    Special Tests:     Tinetti Assessment of Balance and Gait      Balance Tests:      Sitting Balance: 1-Steady, safe       Arises: 2-Able without arms     Attempts to Rise: 2-Able to rise with 1 attempt       Immediate Standing Balance: 1-Steady but with wide stance (greater than 4\") or uses UE support     Standing Balance: 1-Steady, but with wide stance (greater than 4\")        Nudged: 1- Staggers, grabs, catches self     Eyes Closed: 0-Unsteady       Turning 360 Degrees: 1- Unsteady, grabs or stagers     Sitting Down: 2-Safe, smooth motion       Balance Score: 11/16      Gait Tests:      Initiation of Gait: 0-Any hesitation       Step Length (Right Swing Foot): 1-Passes left stance foot     Step Length (Left Swing Foot): 1-Passes right foot stance     Step Clearance (Right Swing Foot): 1-Right foot clears floor     Step Clearance (Left Swing Foot): 1-Left foot clears floor       Step Symmetry: 0-Right and left length not equal     Step Continuity: 0-Stopping or discontinuity between steps       Path: 1-Mild deviation     Trunk: 1-No sway but flexion of the knees or back pain or arms sway out while walking     Walking Time: 0-Heels apart       Gait Score: 6 /12    Gait and Balance Total: 17 /28       Assessment of Score:          25-28= Low Fall Risk        19-24= Medium Fall " Risk        <19= High Fall Risk           Marmolejo Balance Test    1.  Sitting to Standin   4) able to stand without using hands and stabilize independently   3) able to stand independently using hands   2) able to stand using hands after several tries   1) needs minimal aid to stand or to stabilize   0) needs moderate or maximal assist to stand   2.  Standing Unsupported: 4   4) able to stand safely 2 minutes   3) able to stand 2 minutes with supervision   2) able to stand 30 seconds unsupported    1) needs several tries to stand 30 seconds unsupported   0) unable to stand 30 seconds unassisted  3.  Sitting with Back Unsupported: 4   4) able to sit safely and securely 2 minutes   3) able to sit 2 minutes under supervision   2) able to sit 30 seconds   1) able to sit 10 seconds   0) unable to sit without support 10 seconds  4.  Standing to Sittin   4) sits safely with minimal use of hands   3) controls descent by using hands   2) uses back of legs against chair to control descent   1) sits independently but had uncontrolled descent   0) needs assistance to sit  5.  Transfers: 4   4) able to transfer safely with minor use of hands   3) able to transfer safely definite need of hands   2) able to transfer with verbal cuing and/or supervision   1) needs on person to assist   0) needs two people to assist or supervise to be safe  6.  Standing Unsupported with Eyes Closed: 3   4) able to stand 10 seconds safely   3) able to stand 10 seconds with supervision   2) able to stand 3 seconds   1) unable to keep eyes closed 3 seconds but stays safely   0) needs help to keep from falling  7.  Standing Unsupported with Feet Together: 2   4) able to pace feet together independently and stand 1 minute safely   3) able to place feet together independently and stand for 1 minute with supervision   2) able to place feet together independently but unable to hold for 30 seconds   1) needs help to attain position but able to stand 15  seconds feet together   0) needs help to attain position and unable to hold for 15 seconds  8.  Reaching Forward: 4   4) can reach forward confidently 25 cm (10 inches)   3) can reach forward 12 cm safely (5 inches)   2) can reach forward 5 cm safely (2 inches)   1) reaches forward but needs supervision   0) loses balance while trying/requires external support  9.   Object from Floor: 4   4) able to  slipper safely and easily   3) able to  clipper but needs supervision   2) unable to  but reaches 2-5 cm (1-2 inches) from slipper and keeps balance    1) unable to  and needs supervision while trying   0) unable to try/needs assist to keep from losing balance or falling  10. Turning to Look Behind R & L Shoulder: 2   4) looks behind from both sides and weight shifts well   3) looks behind one side only other side shows less weight shift   2) turns sideways only but maintains balance   1) needs supervision while turning   0) needs assist to keep from losing balance or falling  11. Turn 360s: 2   4) able to turn 360 degrees safely in 4 seconds or less   3) able to turn 360 degrees safely one side only 4 seconds or less   2) able to turn 360 safely but slowly   1) needs close supervision or verbal cuing   0) needs assistance while turning  12.  Alternating Feet on Step Stool Unsupported: 3   4) able to stand independently and safely and complete 8 steps in 20 seconds   3) able to stand independently and complete 8 steps in greater than 20 seconds   2) able to complete 4 steps without aid with supervision   1) able to complete greater than 2 steps needs minimal assist   0) needs assistance to keep from falling/unable to try   13. Standing Unsupported one foot in front: 0   4) able to place foot tandem independently and hold 30 seconds   3) able to place foot ahead of other independently and hold 30 seconds   2) able to take small step independently and hold 30 seconds   1) needs help to  step but can hold 15 seconds   0) loses balance while stepping or standing  14.  Standing on One Le   4) able to lift leg independently and hold for greater than 10 seconds   3) able to lift leg independently and hold 5-10 seconds   2) able to lift leg independently and hold equal or greater than 3 seconds   1) tries to lift leg unable to hold 3 seconds but remains standing independently   0) unable to try or needs assist to prevent fall  Total: 38/56       Assessment of Score:  52-56= Normal         41-56= Low Fall Risk         21-40= Medium Fall Risk         0-20= High Fall Risk    Functional Reach: Trials: 13 inches, 12 inches, 13 inches; Average of 12.6 inches   Assessment of Score: less than 7 inches is associated with frailty and increased risk of fall.     Balance:    Bench Sit: independent   Standing: independent with wide base of support    Single Leg Stance (best of 3 attempts): Left= 3 seconds, Right= 3 seconds with severe postural sway and stepping strategy to correct   Sharpened Romberg Stance: Left behind= 6 seconds, Right behind= 5 seconds again with severe postural sway and stepping strategy to correct loss of balance    Modified CTSIB: (30 seconds is WNL)    Condition 1 (Romberg on floor with eyes opened): 30 seconds    Condition 2 (Romberg on floor with eyes closed): 10 seconds      Fall Risk Screening:  History of a fall in the last year  Yes: See above    Today's Intervention:    Pt education on fall risk management.  Educated on ways to modify the home to decrease fall risk.  Educated on the importance of safety and energy conservation.      Discussed continuing home modalities as patient has tried many things and has a routine to help with his pain.   Reviewed standing gastroc stretch     Home Exercise Program: will update as needed     Assessment    Therapist Assessment:  Signs and symptoms consistent with idiopathic peripheral autonomic neuropathy with impaired gait and balance with pain        Functional Impairment(s): Decreased Activity Tolerance:  cannot stand for long periods of time due to pain      Prior Function:   Not Limited  Difficult Transfers:  impaired due to pain, uses his arms when able      Prior Function:  Not Limited  Difficulty with Ambulation:  pain with weight bearing      Prior Function:  Pain Free  Balance:  increased frequency of falls due to pain and impaired balance     Prior Function:   Independent and No Deficits  Sleeping Tolerance:  Difficulty Falling Asleep:  Yes - due to pain  Sleep Disruption, wakes 5 times per night.     Prior Function:  Pain Free and Independent    Physical Impairment(s):       MUSCULOSKELETAL:  Balance Deficits and Pain       NEURO/SENSORY:  Hypersensitivity and Impaired Sensation    Patient Specific Functional and Pain Scales (PSFS): 12/15/2017    Clinician Instructions: Complete after the history and before the exam.    Initial Assessment: We want to know what 3 activities in your life you are unable to perform, or are having the most difficulty performing, as a result of your chief problem. Please list and score at least 3 activities that you are unable to perform, or having the most difficulty performing, because of your chief problem.   Patient Specific Activity Scoring Scheme (score one number for each activity):   Activity Score (0-10)  0= Unable to perform activity  10= Able to perform activity at same level as before injury or problem   1. balance 3/10   2. Walking longer than 5 minutes 3/10   3. Standing longer than 5 minutes 3/10   4. Riding in car to appointments  4/10   Totals:  13/40 = 33 % ability which relates to 67% impairment    Patient verbally states that they understand that the information they have provided above is current and complete to the best of their knowledge.    Patient Specific Functional Scale Modifier Scale Conversion: (patient's modifier that correlates with pt's score on PSFS): 4-CL (60% Impaired).    G codes  and Modifier taken from patient completing the PSFS:   Initial Primary G Code and Modifier:    Per the Patient's intake and/or assessment the Primary G Code is: Mobility .   The Patient's Impairment, Limitation or Restriction Modifier would be best described as: CL - 60% - 80% Impairment.   Goal Primary G Code and Modifier:    The Patient's G Code Goal would be: Mobility    The Patient's Impairment, Limitation or Restriction Modifier goal would be best described as: CK - 40% - 60% Impairment.     Goals:  Functional goals:   Patient is to be independent their in Home exercise program in 2 weeks.  Regulo will demonstrate ability to perform SLS on each LE for at least 8 seconds for improved proprioception within 4 weeks.   Regulo will demonstrate ability to obtain and maintain tandem stance with each LE leading for at least 10 seconds for improved proprioception and balance within 4 weeks.   Regulo will demonstrate improved B DF to at least 8 degrees for improved ability to maintain flat foot contact on floor with gait within 4 weeks.     Patient participated in goal selection and understand(s) the plan of care: Yes   Patient Potential for Achieving Desired Outcome:  Fair, Poor due to chronic neuropathy that has failed other interventions.     Response to Intervention:  Regulo reported his pain was increasing quite a bit towards end of evaluation, was noted to facially grimace much more frequently, however, was also noted to work very hard at having a good attitude and finding humor.        Plan    Treatment Plan / Targeted Outcomes:     Frequency:   up to 6 visits     Duration of Treatment: 8 weeks    Planned Interventions:    Home Exercise Program development  Therapeutic Exercise (ROM & Strengthening)  Manual Therapy  Gait Training  Neuromuscular Re-education  Ultrasound  Electrical Stimulation  Phonophoresis with Ketoprofen  Iontophoresis with Dexamethasone  Therapeutic Activities  Modalities; hot and cold  packs  Vasopneumatic Compression with Cold Therapy ( Game Ready )     Plan for next visit:  Review gastroc stretch, instruct on SLS, tandem stance for balance     Student or PTA has been instructed in and demonstrates skills necessary to carry out above stated treatment plan: Yes    Thank you for your referral to [facility].  Please call with any questions, concerns or comments.  [phone]    The signature, of the referring medical provider, on this document indicates certification of the above prescribed plan of care and is medically necessary.      X____________________________________________________    Physician Signature                     Date  Time

## 2018-02-12 NOTE — NURSING NOTE
Patient Information     Patient Name MRN Regulo Davis [id number] Male 1960      Nursing Note by Gina Johnson at 2017  9:30 AM     Author:  Gina Johnson Service:  (none) Author Type:  (none)     Filed:  2017  9:41 AM Encounter Date:  2017 Status:  Signed     :  Gina Johnson            Patient presents today to fill out form for peripheral neuropathy.    Gina Johnson LPN..............2017 9:29 AM

## 2018-02-13 NOTE — NURSING NOTE
Patient Information     Patient Name MRN Regulo Davis [id number] Male 1960      Nursing Note by Jayashree Aguilar at 2018 11:15 AM     Author:  Jayashree Aguilar Service:  (none) Author Type:  (none)     Filed:  2018 11:19 AM Encounter Date:  2018 Status:  Signed     :  Jayashree Aguilra            Coming in for medication and paperwork, increase in pain, up at night  Jayashree Aguilar ....................  2018   11:07 AM

## 2018-02-13 NOTE — PROGRESS NOTES
"Patient Information     Patient Name MRN Sex Regulo Velasquez [id number] Male 1960      Progress Notes by Kaiden Chavez MD at 2018 11:15 AM     Author:  Kaiden Chavez MD Service:  (none) Author Type:  Physician     Filed:  2018 11:43 AM Encounter Date:  2018 Status:  Signed     :  Kaiden Chavez MD (Physician)            SUBJECTIVE:    Reguol Roth is a 57 y.o. male who presents for follow up neuropathy    HPI    He has not been working since September.  Has paperwork to try to now get SSDI and has paperwork for me to fill out.    Is miserable, especially at night.  Dreads going to bed and sleeps in a chair.  Wakes up around 3:00 and cannot handle the sheets or even air on his feet.  feel like they are \"on fire\".  No longer can walk 200 feet easily and would like a handicapped parking form.  Tomorrow has an appointment for a DRG implant trial.  In 1 week it will be removed and then it will be determined if it will be helpful for him.  Would like to increase the Percocet up to 5 daily from the 4 daily he is on currently.  Is on medical THC as well.  Tox screen last done 17 and was as expected.    He also wants me to send the nexium to [facility].      No Known Allergies,   Current Outpatient Prescriptions on File Prior to Visit       Medication  Sig Dispense Refill     albuterol HFA (PROAIR HFA) 90 mcg/actuation inhaler Inhale 2 Puffs by mouth every 4 hours if needed. 3 Inhaler 2     aspirin 81 mg tablet Take 1 tablet by mouth once daily with a meal.  0     Calcium carbonate (OYSTERSHELL CALCIUM) 500 mg tablet Take 1 tablet by mouth once daily.       cholecalciferol (VITAMIN D) 1,000 unit tablet Take 1 tablet by mouth once daily.       CPAP CPAP, heated humidifier, mask, headgear, filters and tubing. For home use. Pressure:  8   Length of Need: 1 unit 0     doxycycline (VIBRAMYCIN) 100 mg capsule 2 cap once 2 capsule 0     fluocinonide 0.05 TOPICAL (LIDEX) 0.05 % " external solution Apply  topically to affected area(s) 2 times daily. 60 mL 3     fluticasone (50 mcg per actuation) nasal solution (FLONASE) Inhale 2 Sprays into both nostrils once daily. 1 Bottle 12     mometasone 0.1% (ELOCON 0.1% OINTMENT) 0.1 % ointment Apply  topically to affected area(s) once daily. 45 g 3     omega-3 fatty acids-vitamin E (FISH OIL) 1,000 mg cap Take 1 capsule by mouth.  0     No current facility-administered medications on file prior to visit.    ,   Past Medical History:     Diagnosis  Date     Asthma      Chronic eczema      GERD (gastroesophageal reflux disease)      Osteoarthritis, knee      Seasonal allergies     and   Past Surgical History:      Procedure  Laterality Date     CERVICAL FUSION      c6-7       COLONOSCOPY DIAGNOSTIC  12/9/13    F/U 2023       DEBRIDEMENT Left 2012    knee  infection following surgery       ESOPHAGOGASTRODUODENOSCOPY  12/9/13    EGD       HERNIA REPAIR       HERNIA REPAIR       KNEE ARTHROSCOPY  1981    acl and medial meniscus repair [Other]       KNEE REPLACEMENT  August 2012    left - became infected require debridment       MULTIPLE SLEEP LATENCY TEST WITHOUT CPAP      uses C PAP       FL KNEE MANIPULATION  2012     TONSIL AND ADENOIDECTOMY         REVIEW OF SYSTEMS:  Review of Systems   Constitutional: Negative for weight loss.   Respiratory: Negative for shortness of breath.    Musculoskeletal: Negative for back pain and joint pain.   Neurological: Positive for tingling.   Psychiatric/Behavioral: Negative for depression. The patient has insomnia.        OBJECTIVE:  /72 (Cuff Site: Right Arm, Position: Sitting, Cuff Size: Adult Regular)  Wt 135.9 kg (299 lb 9.6 oz)  BMI 42.99 kg/m2    EXAM:   Physical Exam   Constitutional: He is oriented to person, place, and time and well-developed, well-nourished, and in no distress. No distress.   Neurological: He is alert and oriented to person, place, and time.   Skin: He is not diaphoretic.    Psychiatric: Memory, affect and judgment normal.       ASSESSMENT/PLAN:    ICD-10-CM    1. Paresthesia of bilateral legs R20.2 oxyCODONE-acetaminophen, 5-325 mg, (PERCOCET) 5-325 mg per tablet      DISCONTINUED: oxyCODONE-acetaminophen, 5-325 mg, (PERCOCET) 5-325 mg per tablet      DISCONTINUED: oxyCODONE-acetaminophen, 5-325 mg, (PERCOCET) 5-325 mg per tablet   2. Gastroesophageal reflux disease, esophagitis presence not specified K21.9 esomeprazole (NEXIUM) 40 mg capsule        Plan:  I filled out the SSDI application questions. He is very limited currently.  Cannot stand or walk for more than a few minutes.  Increased the percocet up to max of 5 tablets.  New prescriptions given, 150 per month now.   reviewed and stable.  Discussed with him the max dose would be 60 mg oxycodone daily, he is actually on only 20 currently, but I have low expectations for relief, given this is nerve pain.  Has failed many other medications.  35/35 minutes counseling.  Follow up with me in 3 months.    Kaiden Chavez MD ....................  1/23/2018   11:42 AM

## 2018-04-17 ENCOUNTER — OFFICE VISIT (OUTPATIENT)
Dept: FAMILY MEDICINE | Facility: OTHER | Age: 58
End: 2018-04-17
Attending: FAMILY MEDICINE
Payer: COMMERCIAL

## 2018-04-17 VITALS
DIASTOLIC BLOOD PRESSURE: 68 MMHG | WEIGHT: 302.8 LBS | BODY MASS INDEX: 43.45 KG/M2 | RESPIRATION RATE: 16 BRPM | SYSTOLIC BLOOD PRESSURE: 142 MMHG

## 2018-04-17 DIAGNOSIS — G60.9 CHRONIC IDIOPATHIC AXONAL POLYNEUROPATHY: Primary | ICD-10-CM

## 2018-04-17 LAB
ALBUMIN SERPL-MCNC: 4.5 G/DL (ref 3.5–5.7)
ALP SERPL-CCNC: 60 U/L (ref 34–104)
ALT SERPL W P-5'-P-CCNC: 44 U/L (ref 7–52)
ANION GAP SERPL CALCULATED.3IONS-SCNC: 11 MMOL/L (ref 3–14)
AST SERPL W P-5'-P-CCNC: 34 U/L (ref 13–39)
BILIRUB SERPL-MCNC: 0.4 MG/DL (ref 0.3–1)
BUN SERPL-MCNC: 17 MG/DL (ref 7–25)
CALCIUM SERPL-MCNC: 10 MG/DL (ref 8.6–10.3)
CHLORIDE SERPL-SCNC: 102 MMOL/L (ref 98–107)
CO2 SERPL-SCNC: 24 MMOL/L (ref 21–31)
CREAT SERPL-MCNC: 0.94 MG/DL (ref 0.7–1.3)
ERYTHROCYTE [DISTWIDTH] IN BLOOD BY AUTOMATED COUNT: 12.4 % (ref 10–15)
FERRITIN SERPL-MCNC: 177 NG/ML (ref 23.9–336.2)
GFR SERPL CREATININE-BSD FRML MDRD: 83 ML/MIN/1.7M2
GLUCOSE SERPL-MCNC: 123 MG/DL (ref 70–105)
HCT VFR BLD AUTO: 41.7 % (ref 40–53)
HGB BLD-MCNC: 14.9 G/DL (ref 13.3–17.7)
IRON SERPL-MCNC: 83 UG/DL (ref 50–212)
MCH RBC QN AUTO: 31.7 PG (ref 26.5–33)
MCHC RBC AUTO-ENTMCNC: 35.7 G/DL (ref 31.5–36.5)
MCV RBC AUTO: 89 FL (ref 78–100)
PLATELET # BLD AUTO: 177 10E9/L (ref 150–450)
POTASSIUM SERPL-SCNC: 4.2 MMOL/L (ref 3.5–5.1)
PROT SERPL-MCNC: 7.3 G/DL (ref 6.4–8.9)
RBC # BLD AUTO: 4.7 10E12/L (ref 4.4–5.9)
SODIUM SERPL-SCNC: 137 MMOL/L (ref 134–144)
WBC # BLD AUTO: 6.5 10E9/L (ref 4–11)

## 2018-04-17 PROCEDURE — 82728 ASSAY OF FERRITIN: CPT | Performed by: FAMILY MEDICINE

## 2018-04-17 PROCEDURE — 85027 COMPLETE CBC AUTOMATED: CPT | Performed by: FAMILY MEDICINE

## 2018-04-17 PROCEDURE — 80307 DRUG TEST PRSMV CHEM ANLYZR: CPT | Performed by: FAMILY MEDICINE

## 2018-04-17 PROCEDURE — 80053 COMPREHEN METABOLIC PANEL: CPT | Performed by: FAMILY MEDICINE

## 2018-04-17 PROCEDURE — 36415 COLL VENOUS BLD VENIPUNCTURE: CPT | Performed by: FAMILY MEDICINE

## 2018-04-17 PROCEDURE — 99213 OFFICE O/P EST LOW 20 MIN: CPT | Performed by: FAMILY MEDICINE

## 2018-04-17 PROCEDURE — 83540 ASSAY OF IRON: CPT | Performed by: FAMILY MEDICINE

## 2018-04-17 RX ORDER — OXYCODONE AND ACETAMINOPHEN 5; 325 MG/1; MG/1
1-2 TABLET ORAL EVERY 6 HOURS PRN
Qty: 150 TABLET | Refills: 0 | Status: SHIPPED | OUTPATIENT
Start: 2018-04-17 | End: 2018-04-17

## 2018-04-17 RX ORDER — OXYCODONE AND ACETAMINOPHEN 5; 325 MG/1; MG/1
1-2 TABLET ORAL EVERY 6 HOURS PRN
Qty: 150 TABLET | Refills: 0 | Status: SHIPPED | OUTPATIENT
Start: 2018-04-17 | End: 2018-07-11

## 2018-04-17 ASSESSMENT — ANXIETY QUESTIONNAIRES
2. NOT BEING ABLE TO STOP OR CONTROL WORRYING: NOT AT ALL
5. BEING SO RESTLESS THAT IT IS HARD TO SIT STILL: NOT AT ALL
6. BECOMING EASILY ANNOYED OR IRRITABLE: NOT AT ALL
7. FEELING AFRAID AS IF SOMETHING AWFUL MIGHT HAPPEN: NOT AT ALL
1. FEELING NERVOUS, ANXIOUS, OR ON EDGE: NOT AT ALL
GAD7 TOTAL SCORE: 0
IF YOU CHECKED OFF ANY PROBLEMS ON THIS QUESTIONNAIRE, HOW DIFFICULT HAVE THESE PROBLEMS MADE IT FOR YOU TO DO YOUR WORK, TAKE CARE OF THINGS AT HOME, OR GET ALONG WITH OTHER PEOPLE: NOT DIFFICULT AT ALL
3. WORRYING TOO MUCH ABOUT DIFFERENT THINGS: NOT AT ALL

## 2018-04-17 ASSESSMENT — PAIN SCALES - GENERAL: PAINLEVEL: EXTREME PAIN (8)

## 2018-04-17 ASSESSMENT — ENCOUNTER SYMPTOMS
PARESTHESIAS: 1
FATIGUE: 1
NUMBNESS: 1
MYALGIAS: 0

## 2018-04-17 ASSESSMENT — PATIENT HEALTH QUESTIONNAIRE - PHQ9: 5. POOR APPETITE OR OVEREATING: NOT AT ALL

## 2018-04-17 NOTE — PROGRESS NOTES
SUBJECTIVE:   Regulo Roth is a 57 year old male who presents to clinic today for the following health issues:    HPI  Follow up on he pain.  His brother who is an MD has been diagnosed with hemachromatosis.  Several other family members have now also been diagnosed and he wants testing.  2 siblings are homozygous for HH.    He is approved for the neural stimulator.  With the trial he had some relief, but it was not great.  It was enough that he wants to move forward with it.   Feels like the foot pain is getting worse.  Getting cramping into both feet.  Symptoms are worse from a year ago.    Patient Active Problem List    Diagnosis Date Noted     Epidural lipomatosis 08/04/2017     Priority: Medium     Chronic idiopathic axonal polyneuropathy 06/21/2017     Priority: Medium     Neuropathy 06/17/2016     Priority: Medium     Sleep apnea, obstructive 06/30/2015     Priority: Medium     Pain medication agreement 09/04/2014     Priority: Medium     Overview:   Updated 6/3/2016       GERD (gastroesophageal reflux disease) 11/25/2013     Priority: Medium     Health care maintenance 11/25/2013     Priority: Medium     Vitamin D deficiency 11/15/2013     Priority: Medium     Paresthesia of bilateral legs 11/11/2013     Priority: Medium     Rhinitis medicamentosa 03/08/2013     Priority: Medium     Asthma 02/22/2012     Priority: Medium     Eye irritation 10/06/2011     Priority: Medium     Osteoarthritis, knee 06/22/2011     Priority: Medium     Plantar fascial fibromatosis 06/22/2011     Priority: Medium     Seborrheic dermatitis 06/22/2011     Priority: Medium     Past Surgical History:   Procedure Laterality Date     ARTHROPLASTY KNEE      August 2012,left - became infected require debridment     ARTHROSCOPY KNEE      1981,acl and medial meniscus repair [Other]     COLONOSCOPY      12/9/13,F/U 2023     ESOPHAGOSCOPY, GASTROSCOPY, DUODENOSCOPY (EGD), COMBINED      12/9/13,EGD     FUSION CERVICAL ANTERIOR ONE  LEVEL      c6-7     OTHER SURGICAL HISTORY      ,HERNIA REPAIR     OTHER SURGICAL HISTORY      ,HERNIA REPAIR     OTHER SURGICAL HISTORY      2012,205436,DEBRIDEMENT,Left,knee  infection following surgery     OTHER SURGICAL HISTORY      2012,27599.3,AL KNEE MANIPULATION     OTHER SURGICAL HISTORY      206904,MULTIPLE SLEEP LATENCY TEST WITHOUT CPAP,uses C PAP     TONSILLECTOMY, ADENOIDECTOMY, COMBINED      No Comments Provided     Social History   Substance Use Topics     Smoking status: Former Smoker     Packs/day: 1.00     Years: 18.00     Smokeless tobacco: Never Used     Alcohol use 5.0 oz/week      Comment: Alcoholic Drinks/day: occ     Current Outpatient Prescriptions   Medication Sig Dispense Refill     oxyCODONE-acetaminophen (PERCOCET) 5-325 MG per tablet Take 1-2 tablets by mouth every 6 hours as needed for pain Max acetaminophen dose: 4000 mg in 24 hours 150 tablet 0     ASPIRIN 81 PO Take 81 mg by mouth daily with food       order for DME CPAP, heated humidifier, mask, headgear, filters and tubing. For home use. Pressure:  8   Length of Need:       fluocinonide (LIDEX) 0.05 % solution        esomeprazole (NEXIUM) 40 MG CR capsule Take 40 mg by mouth daily Before a meal       oxyCODONE-acetaminophen (PERCOCET) 5-325 MG per tablet Take 1 tablet by mouth 3 times daily       Esomeprazole Magnesium (NEXIUM PO) Take 40 mg by mouth every morning (before breakfast)       aspirin 81 MG tablet Take 81 mg by mouth daily       order for DME Equipment being ordered: CPAP       cholecalciferol (VITAMIN D-1000 MAX ST) 1000 UNITS TABS Take 1,000 Units by mouth daily       doxycycline (VIBRAMYCIN) 100 MG capsule 2 cap once       fluticasone (FLONASE) 50 MCG/ACT spray Spray 2 sprays into both nostrils daily       mometasone (ELOCON) 0.1 % ointment        fish oil-omega-3 fatty acids 1000 MG capsule Take 1 capsule by mouth       albuterol (PROAIR HFA/PROVENTIL HFA/VENTOLIN HFA) 108 (90 BASE) MCG/ACT Inhaler  Inhale 2 puffs into the lungs every 4 hours as needed       CALCIUM PO Take 500 mg by mouth daily       calcium carb 1250 mg, 500 mg Council,/vitamin D 200 units (OSCAL WITH D) 500-200 MG-UNIT per tablet Take 2 tablets by mouth daily       VITAMIN E COMPLEX PO Take by mouth daily       albuterol (PROAIR HFA, PROVENTIL HFA, VENTOLIN HFA) 108 (90 BASE) MCG/ACT inhaler Inhale 2 puffs into the lungs every 4 hours as needed for shortness of breath / dyspnea or wheezing       Alpha Lipoic Acid 200 MG CAPS Take 200 mg by mouth 3 times daily (Patient not taking: Reported on 4/17/2018) 90 capsule 3     No Known Allergies    Review of Systems   Constitutional: Positive for fatigue.   Musculoskeletal: Negative for myalgias.   Neurological: Positive for numbness and paresthesias.        OBJECTIVE:     /68 (BP Location: Right arm, Patient Position: Sitting, Cuff Size: Adult Large)  Resp 16  Wt 302 lb 12.8 oz (137.3 kg)  BMI 43.45 kg/m2  Body mass index is 43.45 kg/(m^2).  Physical Exam   Constitutional: He is oriented to person, place, and time. He appears well-developed and well-nourished. No distress.   Musculoskeletal: He exhibits no edema.   Neurological: He is alert and oriented to person, place, and time.   Skin: He is not diaphoretic.   Psychiatric: He has a normal mood and affect. His behavior is normal. Thought content normal.       Diagnostic Test Results:  Results for orders placed or performed in visit on 04/17/18 (from the past 24 hour(s))   Iron   Result Value Ref Range    Iron 83 50 - 212 ug/dL   Ferritin   Result Value Ref Range    Ferritin 177 23.9 - 336.2 ng/mL   Comprehensive metabolic panel (BMP + Alb, Alk Phos, ALT, AST, Total. Bili, TP)   Result Value Ref Range    Sodium 137 134 - 144 mmol/L    Potassium 4.2 3.5 - 5.1 mmol/L    Chloride 102 98 - 107 mmol/L    Carbon Dioxide 24 21 - 31 mmol/L    Anion Gap 11 3 - 14 mmol/L    Glucose 123 (H) 70 - 105 mg/dL    Urea Nitrogen 17 7 - 25 mg/dL    Creatinine  0.94 0.70 - 1.30 mg/dL    GFR Estimate 83 >60 mL/min/1.7m2    GFR Estimate If Black >90 >60 mL/min/1.7m2    Calcium 10.0 8.6 - 10.3 mg/dL    Bilirubin Total 0.4 0.3 - 1.0 mg/dL    Albumin 4.5 3.5 - 5.7 g/dL    Protein Total 7.3 6.4 - 8.9 g/dL    Alkaline Phosphatase 60 34 - 104 U/L    ALT 44 7 - 52 U/L    AST 34 13 - 39 U/L   CBC with platelets   Result Value Ref Range    WBC 6.5 4.0 - 11.0 10e9/L    RBC Count 4.70 4.4 - 5.9 10e12/L    Hemoglobin 14.9 13.3 - 17.7 g/dL    Hematocrit 41.7 40.0 - 53.0 %    MCV 89 78 - 100 fl    MCH 31.7 26.5 - 33.0 pg    MCHC 35.7 31.5 - 36.5 g/dL    RDW 12.4 10.0 - 15.0 %    Platelet Count 177 150 - 450 10e9/L       ASSESSMENT/PLAN:       (G60.9) Chronic idiopathic axonal polyneuropathy  (primary encounter diagnosis)  Comment: stable  Plan: Iron, Ferritin, Comprehensive metabolic panel         (BMP + Alb, Alk Phos, ALT, AST, Total. Bili,         TP), oxyCODONE-acetaminophen (PERCOCET) 5-325         MG per tablet, Drug  Screen Comprehensive ,         Urine with Reported Meds (MedTox) (Pain Care         Package), DISCONTINUED: oxyCODONE-acetaminophen        (PERCOCET) 5-325 MG per tablet, DISCONTINUED:         oxyCODONE-acetaminophen (PERCOCET) 5-325 MG per        tablet        It is hard to say if the progression is from more disease or if it is from the narcotics being less useful.  Will test for the hemachromatosis.   reviewed and stable.  Is on medical THC.        Kaiden Chavez MD  [facility]

## 2018-04-17 NOTE — MR AVS SNAPSHOT
After Visit Summary   4/17/2018    Regulo Roth    MRN: [id number]           Patient Information     Date Of Birth          1960        Visit Information        Provider Department      4/17/2018 10:30 AM Kaiden Chavez MD [facility]        Today's Diagnoses     Chronic idiopathic axonal polyneuropathy    -  1       Follow-ups after your visit        Who to contact     If you have questions or need follow up information about today's clinic visit or your schedule please contact [facility] directly at [phone].  Normal or non-critical lab and imaging results will be communicated to you by [employer], letter or phone within 4 business days after the clinic has received the results. If you do not hear from us within 7 days, please contact the clinic through [employer] or phone. If you have a critical or abnormal lab result, we will notify you by phone as soon as possible.  Submit refill requests through [employer] or call your pharmacy and they will forward the refill request to us. Please allow 3 business days for your refill to be completed.          Additional Information About Your Visit        MyChart Information     [employer] gives you secure access to your electronic health record. If you see a primary care provider, you can also send messages to your care team and make appointments. If you have questions, please call your primary care clinic.  If you do not have a primary care provider, please call [phone] and they will assist you.        Care EveryWhere ID     This is your Care EveryWhere ID. This could be used by other organizations to access your [place] medical records  [id number]        Your Vitals Were     Respirations BMI (Body Mass Index)                16 43.45 kg/m2           Blood Pressure from Last 3 Encounters:   04/17/18 142/68   01/23/18 130/72   12/12/17 122/66    Weight from Last 3 Encounters:   04/17/18 302 lb 12.8 oz (137.3  kg)   01/23/18 299 lb 9.6 oz (135.9 kg)   12/12/17 (!) 302 lb 9.6 oz (137.3 kg)              We Performed the Following     CBC with platelets     Comprehensive metabolic panel (BMP + Alb, Alk Phos, ALT, AST, Total. Bili, TP)     Drug  Screen Comprehensive , Urine with Reported Meds (MedTox) (Pain Care Package)     Ferritin     Iron          Today's Medication Changes          These changes are accurate as of 4/17/18 11:02 AM.  If you have any questions, ask your nurse or doctor.               These medicines have changed or have updated prescriptions.        Dose/Directions    * oxyCODONE-acetaminophen 5-325 MG per tablet   Commonly known as:  PERCOCET   This may have changed:  Another medication with the same name was added. Make sure you understand how and when to take each.   Changed by:  Kaiden Chavez MD        Dose:  1 tablet   Take 1 tablet by mouth 3 times daily   Refills:  0       * oxyCODONE-acetaminophen 5-325 MG per tablet   Commonly known as:  PERCOCET   This may have changed:  You were already taking a medication with the same name, and this prescription was added. Make sure you understand how and when to take each.   Used for:  Chronic idiopathic axonal polyneuropathy   Changed by:  Kaiden Chavez MD        Dose:  1-2 tablet   Take 1-2 tablets by mouth every 6 hours as needed for pain Max acetaminophen dose: 4000 mg in 24 hours   Quantity:  150 tablet   Refills:  0       * Notice:  This list has 2 medication(s) that are the same as other medications prescribed for you. Read the directions carefully, and ask your doctor or other care provider to review them with you.         Where to get your medicines      Some of these will need a paper prescription and others can be bought over the counter.  Ask your nurse if you have questions.     Bring a paper prescription for each of these medications     oxyCODONE-acetaminophen 5-325 MG per tablet                Primary Care Provider Office Phone # Fax #     Kaiden Chavez [phone]       [address] [facility] [address]        Equal Access to Services     JAIME PERSAUD : Hadii aad ku hadwallycarlos Vazquez, karenchloe gutierrezvanessaha, jennifermayra adamejair anantsb, rustam vuin hayaamichael nyeverett sherwoodyann mcleod. So [facility] [phone].    ATENCIÓN: Si habla español, tiene a house disposición servicios gratuitos de asistencia lingüística. Llame al [phone].    We comply with applicable federal civil rights laws and [place] laws. We do not discriminate on the basis of race, color, national origin, age, disability, sex, sexual orientation, or gender identity.            Thank you!     Thank you for choosing [facility] AND [facility]  for your care. Our goal is always to provide you with excellent care. Hearing back from our patients is one way we can continue to improve our services. Please take a few minutes to complete the written survey that you may receive in the mail after your visit with us. Thank you!             Your Updated Medication List - Protect others around you: Learn how to safely use, store and throw away your medicines at www.disposemymeds.org.          This list is accurate as of 4/17/18 11:02 AM.  Always use your most recent med list.                   Brand Name Dispense Instructions for use Diagnosis    * albuterol 108 (90 Base) MCG/ACT Inhaler    PROAIR HFA/PROVENTIL HFA/VENTOLIN HFA     Inhale 2 puffs into the lungs every 4 hours as needed for shortness of breath / dyspnea or wheezing        * albuterol 108 (90 Base) MCG/ACT Inhaler    PROAIR HFA/PROVENTIL HFA/VENTOLIN HFA     Inhale 2 puffs into the lungs every 4 hours as needed        Alpha Lipoic Acid 200 MG Caps     90 capsule    Take 200 mg by mouth 3 times daily    Hereditary and idiopathic peripheral neuropathy       * aspirin 81 MG tablet      Take 81 mg by mouth daily        * ASPIRIN 81 PO      Take 81 mg by mouth daily with food        Calcium carb-Vitamin D 500 mg [place]-200  units 500-200 MG-UNIT per tablet    OSCAL with D;Oyster Shell Calcium     Take 2 tablets by mouth daily        CALCIUM PO      Take 500 mg by mouth daily        doxycycline 100 MG capsule    VIBRAMYCIN     2 cap once        fish oil-omega-3 fatty acids 1000 MG capsule      Take 1 capsule by mouth        fluocinonide 0.05 % solution    LIDEX          fluticasone 50 MCG/ACT spray    FLONASE     Spray 2 sprays into both nostrils daily        mometasone 0.1 % ointment    ELOCON          * NEXIUM PO      Take 40 mg by mouth every morning (before breakfast)        * esomeprazole 40 MG CR capsule    nexIUM     Take 40 mg by mouth daily Before a meal        order for DME      Equipment being ordered: CPAP        order for DME      CPAP, heated humidifier, mask, headgear, filters and tubing. For home use. Pressure:  8   Length of Need:        * oxyCODONE-acetaminophen 5-325 MG per tablet    PERCOCET     Take 1 tablet by mouth 3 times daily        * oxyCODONE-acetaminophen 5-325 MG per tablet    PERCOCET    150 tablet    Take 1-2 tablets by mouth every 6 hours as needed for pain Max acetaminophen dose: 4000 mg in 24 hours    Chronic idiopathic axonal polyneuropathy       VITAMIN D-1000 MAX ST 1000 units Tabs   Generic drug:  cholecalciferol      Take 1,000 Units by mouth daily        VITAMIN E COMPLEX PO      Take by mouth daily        * Notice:  This list has 8 medication(s) that are the same as other medications prescribed for you. Read the directions carefully, and ask your doctor or other care provider to review them with you.

## 2018-04-18 ENCOUNTER — TELEPHONE (OUTPATIENT)
Dept: FAMILY MEDICINE | Facility: OTHER | Age: 58
End: 2018-04-18

## 2018-04-18 DIAGNOSIS — Z83.49 FH: HEMOCHROMATOSIS: ICD-10-CM

## 2018-04-18 DIAGNOSIS — G60.9 CHRONIC IDIOPATHIC AXONAL POLYNEUROPATHY: ICD-10-CM

## 2018-04-18 DIAGNOSIS — G60.9 CHRONIC IDIOPATHIC AXONAL POLYNEUROPATHY: Primary | ICD-10-CM

## 2018-04-18 PROCEDURE — 36415 COLL VENOUS BLD VENIPUNCTURE: CPT | Performed by: FAMILY MEDICINE

## 2018-04-18 PROCEDURE — 81256 HFE GENE: CPT | Performed by: FAMILY MEDICINE

## 2018-04-18 ASSESSMENT — ANXIETY QUESTIONNAIRES: GAD7 TOTAL SCORE: 0

## 2018-04-18 NOTE — TELEPHONE ENCOUNTER
TJP-Pt calling re DNA test and Insurance update. Also wants pre-op today for spinal stim implant being done tomorrow. Please Call. Thank You.  Suzanna Luu

## 2018-04-21 LAB — PAIN DRUG SCR UR W RPTD MEDS: NORMAL

## 2018-04-23 LAB — COPATH REPORT: NORMAL

## 2018-07-11 ENCOUNTER — OFFICE VISIT (OUTPATIENT)
Dept: FAMILY MEDICINE | Facility: OTHER | Age: 58
End: 2018-07-11
Attending: FAMILY MEDICINE
Payer: COMMERCIAL

## 2018-07-11 VITALS
WEIGHT: 290 LBS | BODY MASS INDEX: 41.61 KG/M2 | RESPIRATION RATE: 16 BRPM | SYSTOLIC BLOOD PRESSURE: 128 MMHG | DIASTOLIC BLOOD PRESSURE: 72 MMHG

## 2018-07-11 DIAGNOSIS — G60.9 CHRONIC IDIOPATHIC AXONAL POLYNEUROPATHY: ICD-10-CM

## 2018-07-11 PROCEDURE — 99213 OFFICE O/P EST LOW 20 MIN: CPT | Performed by: FAMILY MEDICINE

## 2018-07-11 RX ORDER — OXYCODONE AND ACETAMINOPHEN 5; 325 MG/1; MG/1
1-2 TABLET ORAL EVERY 6 HOURS PRN
Qty: 180 TABLET | Refills: 0 | Status: SHIPPED | OUTPATIENT
Start: 2018-07-11 | End: 2018-07-11

## 2018-07-11 RX ORDER — OXYCODONE AND ACETAMINOPHEN 5; 325 MG/1; MG/1
1-2 TABLET ORAL EVERY 6 HOURS PRN
Qty: 180 TABLET | Refills: 0 | Status: SHIPPED | OUTPATIENT
Start: 2018-07-11 | End: 2018-10-05

## 2018-07-11 ASSESSMENT — ENCOUNTER SYMPTOMS
NUMBNESS: 1
WEAKNESS: 0
BACK PAIN: 0
PARESTHESIAS: 1
FEVER: 0

## 2018-07-11 ASSESSMENT — PAIN SCALES - GENERAL: PAINLEVEL: SEVERE PAIN (6)

## 2018-07-11 ASSESSMENT — ANXIETY QUESTIONNAIRES
GAD7 TOTAL SCORE: 0
7. FEELING AFRAID AS IF SOMETHING AWFUL MIGHT HAPPEN: NOT AT ALL
2. NOT BEING ABLE TO STOP OR CONTROL WORRYING: NOT AT ALL
6. BECOMING EASILY ANNOYED OR IRRITABLE: NOT AT ALL
1. FEELING NERVOUS, ANXIOUS, OR ON EDGE: NOT AT ALL
3. WORRYING TOO MUCH ABOUT DIFFERENT THINGS: NOT AT ALL
5. BEING SO RESTLESS THAT IT IS HARD TO SIT STILL: NOT AT ALL
IF YOU CHECKED OFF ANY PROBLEMS ON THIS QUESTIONNAIRE, HOW DIFFICULT HAVE THESE PROBLEMS MADE IT FOR YOU TO DO YOUR WORK, TAKE CARE OF THINGS AT HOME, OR GET ALONG WITH OTHER PEOPLE: NOT DIFFICULT AT ALL

## 2018-07-11 ASSESSMENT — PATIENT HEALTH QUESTIONNAIRE - PHQ9: 5. POOR APPETITE OR OVEREATING: NOT AT ALL

## 2018-07-11 NOTE — PROGRESS NOTES
"  SUBJECTIVE:   Regulo Roth is a 57 year old male who presents to clinic today for the following health issues:    HPI  Follow up on neuropathy and pain meds.  His symptoms are about the same.  Has now gotten SSDI, and getting long term disability from [place] [employer].  Had a spinal stimulator placed in April.  He feels it has reduced the \"stabbing pains\" for the most part.  No change on burning sensation and severe pain in soles of feet when walking.  Has lost 10 # with diet changes.  Trying to lose more.  Drug screen was as expected 4/17/18.  He has medical THC at home, stopped it a few months ago, really only helped him sleep a little harder.      Patient Active Problem List    Diagnosis Date Noted     Epidural lipomatosis 08/04/2017     Priority: Medium     Chronic idiopathic axonal polyneuropathy 06/21/2017     Priority: Medium     Neuropathy 06/17/2016     Priority: Medium     Sleep apnea, obstructive 06/30/2015     Priority: Medium     Pain medication agreement 09/04/2014     Priority: Medium     Overview:   Updated 6/3/2016       GERD (gastroesophageal reflux disease) 11/25/2013     Priority: Medium     Health care maintenance 11/25/2013     Priority: Medium     Vitamin D deficiency 11/15/2013     Priority: Medium     Paresthesia of bilateral legs 11/11/2013     Priority: Medium     Rhinitis medicamentosa 03/08/2013     Priority: Medium     Asthma 02/22/2012     Priority: Medium     Eye irritation 10/06/2011     Priority: Medium     Osteoarthritis, knee 06/22/2011     Priority: Medium     Plantar fascial fibromatosis 06/22/2011     Priority: Medium     Seborrheic dermatitis 06/22/2011     Priority: Medium     Past Surgical History:   Procedure Laterality Date     ARTHROPLASTY KNEE      August 2012,left - became infected require debridment     ARTHROSCOPY KNEE      1981,acl and medial meniscus repair [Other]     COLONOSCOPY      12/9/13,F/U 2023     ESOPHAGOSCOPY, GASTROSCOPY, DUODENOSCOPY (EGD), COMBINED   "    12/9/13,EGD     FUSION CERVICAL ANTERIOR ONE LEVEL      c6-7     OTHER SURGICAL HISTORY      ,HERNIA REPAIR     OTHER SURGICAL HISTORY      ,HERNIA REPAIR     OTHER SURGICAL HISTORY      2012,205436,DEBRIDEMENT,Left,knee  infection following surgery     OTHER SURGICAL HISTORY      2012,27599.3,RI KNEE MANIPULATION     OTHER SURGICAL HISTORY      206904,MULTIPLE SLEEP LATENCY TEST WITHOUT CPAP,uses C PAP     TONSILLECTOMY, ADENOIDECTOMY, COMBINED      No Comments Provided     Social History   Substance Use Topics     Smoking status: Former Smoker     Packs/day: 1.00     Years: 18.00     Smokeless tobacco: Never Used     Alcohol use 5.0 oz/week      Comment: Alcoholic Drinks/day: occ     Current Outpatient Prescriptions   Medication Sig Dispense Refill     albuterol (PROAIR HFA, PROVENTIL HFA, VENTOLIN HFA) 108 (90 BASE) MCG/ACT inhaler Inhale 2 puffs into the lungs every 4 hours as needed for shortness of breath / dyspnea or wheezing       albuterol (PROAIR HFA/PROVENTIL HFA/VENTOLIN HFA) 108 (90 BASE) MCG/ACT Inhaler Inhale 2 puffs into the lungs every 4 hours as needed       Alpha Lipoic Acid 200 MG CAPS Take 200 mg by mouth 3 times daily 90 capsule 3     aspirin 81 MG tablet Take 81 mg by mouth daily       ASPIRIN 81 PO Take 81 mg by mouth daily with food       calcium carb 1250 mg, 500 mg [place],/vitamin D 200 units (OSCAL WITH D) 500-200 MG-UNIT per tablet Take 2 tablets by mouth daily       CALCIUM PO Take 500 mg by mouth daily       cholecalciferol (VITAMIN D-1000 MAX ST) 1000 UNITS TABS Take 1,000 Units by mouth daily       doxycycline (VIBRAMYCIN) 100 MG capsule 2 cap once       esomeprazole (NEXIUM) 40 MG CR capsule Take 40 mg by mouth daily Before a meal       Esomeprazole Magnesium (NEXIUM PO) Take 40 mg by mouth every morning (before breakfast)       fish oil-omega-3 fatty acids 1000 MG capsule Take 1 capsule by mouth       fluocinonide (LIDEX) 0.05 % solution        fluticasone (FLONASE) 50  MCG/ACT spray Spray 2 sprays into both nostrils daily       mometasone (ELOCON) 0.1 % ointment        order for DME CPAP, heated humidifier, mask, headgear, filters and tubing. For home use. Pressure:  8   Length of Need:       order for DME Equipment being ordered: CPAP       oxyCODONE-acetaminophen (PERCOCET) 5-325 MG per tablet Take 1-2 tablets by mouth every 6 hours as needed for pain Max acetaminophen dose: 4000 mg in 24 hours.  Fill on/after 9/10/18 180 tablet 0     VITAMIN E COMPLEX PO Take by mouth daily       No Known Allergies    Review of Systems   Constitutional: Negative for fever.   Musculoskeletal: Negative for back pain.   Neurological: Positive for numbness and paresthesias. Negative for weakness.        OBJECTIVE:     /72 (BP Location: Right arm, Patient Position: Sitting, Cuff Size: Adult Regular)  Resp 16  Wt 290 lb (131.5 kg)  BMI 41.61 kg/m2  Body mass index is 41.61 kg/(m^2).  Physical Exam   Constitutional: He is oriented to person, place, and time. He appears well-developed and well-nourished. No distress.   Neurological: He is alert and oriented to person, place, and time.   Skin: Skin is warm and dry. He is not diaphoretic.   Psychiatric: He has a normal mood and affect. His behavior is normal. Thought content normal.       Diagnostic Test Results:  none     ASSESSMENT/PLAN:         (G60.9) Chronic idiopathic axonal polyneuropathy  Comment: slightly improved  Plan: oxyCODONE-acetaminophen (PERCOCET) 5-325 MG per        tablet, DISCONTINUED: oxyCODONE-acetaminophen         (PERCOCET) 5-325 MG per tablet, DISCONTINUED:         oxyCODONE-acetaminophen (PERCOCET) 5-325 MG per        tablet        The stimulator is helping a little.  Refilled the percocet for him, #180 per month, with 3 months total given.  Follow up in 3 months.          Kaiden Chavez MD  [facility]

## 2018-07-11 NOTE — MR AVS SNAPSHOT
After Visit Summary   7/11/2018    Regulo Roth    MRN: [id number]           Patient Information     Date Of Birth          1960        Visit Information        Provider Department      7/11/2018 9:30 AM Kaiden Chavez MD [facility] [place] [facility]        Today's Diagnoses     Chronic idiopathic axonal polyneuropathy           Follow-ups after your visit        Follow-up notes from your care team     Return in about 3 months (around 10/11/2018).      Who to contact     If you have questions or need follow up information about today's clinic visit or your schedule please contact [facility] AND [facility] directly at [phone].  Normal or non-critical lab and imaging results will be communicated to you by MyChart, letter or phone within 4 business days after the clinic has received the results. If you do not hear from us within 7 days, please contact the clinic through [employer] or phone. If you have a critical or abnormal lab result, we will notify you by phone as soon as possible.  Submit refill requests through [employer] or call your pharmacy and they will forward the refill request to us. Please allow 3 business days for your refill to be completed.          Additional Information About Your Visit        MyChart Information     [employer] gives you secure access to your electronic health record. If you see a primary care provider, you can also send messages to your care team and make appointments. If you have questions, please call your primary care clinic.  If you do not have a primary care provider, please call [phone] and they will assist you.        Care EveryWhere ID     This is your Care EveryWhere ID. This could be used by other organizations to access your [facility] medical records  [id number]        Your Vitals Were     Respirations BMI (Body Mass Index)                16 41.61 kg/m2           Blood Pressure from Last 3 Encounters:   07/11/18 128/72   04/17/18  142/68   01/23/18 130/72    Weight from Last 3 Encounters:   07/11/18 290 lb (131.5 kg)   04/17/18 302 lb 12.8 oz (137.3 kg)   01/23/18 299 lb 9.6 oz (135.9 kg)              Today, you had the following     No orders found for display         Today's Medication Changes          These changes are accurate as of 7/11/18 10:05 AM.  If you have any questions, ask your nurse or doctor.               These medicines have changed or have updated prescriptions.        Dose/Directions    oxyCODONE-acetaminophen 5-325 MG per tablet   Commonly known as:  PERCOCET   This may have changed:    - how much to take  - when to take this  - reasons to take this  - additional instructions   Used for:  Chronic idiopathic axonal polyneuropathy   Changed by:  Kaiden Chavez MD        Dose:  1-2 tablet   Take 1-2 tablets by mouth every 6 hours as needed for pain Max acetaminophen dose: 4000 mg in 24 hours.  Fill on/after 9/10/18   Quantity:  180 tablet   Refills:  0            Where to get your medicines      Some of these will need a paper prescription and others can be bought over the counter.  Ask your nurse if you have questions.     Bring a paper prescription for each of these medications     oxyCODONE-acetaminophen 5-325 MG per tablet               Information about OPIOIDS     PRESCRIPTION OPIOIDS: WHAT YOU NEED TO KNOW   We gave you an opioid (narcotic) pain medicine. It is important to manage your pain, but opioids are not always the best choice. You should first try all the other options your care team gave you. Take this medicine for as short a time (and as few doses) as possible.     These medicines have risks:    DO NOT drive when on new or higher doses of pain medicine. These medicines can affect your alertness and reaction times, and you could be arrested for driving under the influence (DUI). If you need to use opioids long-term, talk to your care team about driving.    DO NOT operate heave machinery    DO NOT do any other  dangerous activities while taking these medicines.     DO NOT drink any alcohol while taking these medicines.      If the opioid prescribed includes acetaminophen, DO NOT take with any other medicines that contain acetaminophen. Read all labels carefully. Look for the word  acetaminophen  or  Tylenol.  Ask your pharmacist if you have questions or are unsure.    You can get addicted to pain medicines, especially if you have a history of addiction (chemical, alcohol or substance dependence). Talk to your care team about ways to reduce this risk.    Store your pills in a secure place, locked if possible. We will not replace any lost or stolen medicine. If you don t finish your medicine, please throw away (dispose) as directed by your pharmacist. The [place] [employer]ution [employer] Agency has more information about safe disposal: https://www.pca.[facility].[place]/living-green/managing-unwanted-medications.     All opioids tend to cause constipation. Drink plenty of water and eat foods that have a lot of fiber, such as fruits, vegetables, prune juice, apple juice and high-fiber cereal. Take a laxative (Miralax, milk of magnesia, Colace, Senna) if you don t move your bowels at least every other day.          Primary Care Provider Office Phone # Fax #    Kaiden Chavez [phone] [address] [facility] [address]        Equal Access to Services     JAIME PERSAUD AH: Hadritika mccordo Socat, waaxda luqadaha, qaybta kaalmada adeegyada, rustam mcleod. So [facility] [phone].    ATENCIÓN: Si habla español, tiene a house disposición servicios gratuitos de asistencia lingüística. Llame al [phone].    We comply with applicable federal civil rights laws and [place] laws. We do not discriminate on the basis of race, color, national origin, age, disability, sex, sexual orientation, or gender identity.            Thank you!     Thank you for choosing [facility] AND  [facility]  for your care. Our goal is always to provide you with excellent care. Hearing back from our patients is one way we can continue to improve our services. Please take a few minutes to complete the written survey that you may receive in the mail after your visit with us. Thank you!             Your Updated Medication List - Protect others around you: Learn how to safely use, store and throw away your medicines at www.disposemymeds.org.          This list is accurate as of 7/11/18 10:05 AM.  Always use your most recent med list.                   Brand Name Dispense Instructions for use Diagnosis    * albuterol 108 (90 Base) MCG/ACT Inhaler    PROAIR HFA/PROVENTIL HFA/VENTOLIN HFA     Inhale 2 puffs into the lungs every 4 hours as needed for shortness of breath / dyspnea or wheezing        * albuterol 108 (90 Base) MCG/ACT Inhaler    PROAIR HFA/PROVENTIL HFA/VENTOLIN HFA     Inhale 2 puffs into the lungs every 4 hours as needed        Alpha Lipoic Acid 200 MG Caps     90 capsule    Take 200 mg by mouth 3 times daily    Hereditary and idiopathic peripheral neuropathy       * aspirin 81 MG tablet      Take 81 mg by mouth daily        * ASPIRIN 81 PO      Take 81 mg by mouth daily with food        Calcium carb-Vitamin D 500 mg [place]-200 units 500-200 MG-UNIT per tablet    OSCAL with D;Oyster Shell Calcium     Take 2 tablets by mouth daily        CALCIUM PO      Take 500 mg by mouth daily        doxycycline 100 MG capsule    VIBRAMYCIN     2 cap once        fish oil-omega-3 fatty acids 1000 MG capsule      Take 1 capsule by mouth        fluocinonide 0.05 % solution    LIDEX          fluticasone 50 MCG/ACT spray    FLONASE     Spray 2 sprays into both nostrils daily        mometasone 0.1 % ointment    ELOCON          * NEXIUM PO      Take 40 mg by mouth every morning (before breakfast)        * esomeprazole 40 MG CR capsule    nexIUM     Take 40 mg by mouth daily Before a meal        order for DME      Equipment  being ordered: CPAP        order for DME      CPAP, heated humidifier, mask, headgear, filters and tubing. For home use. Pressure:  8   Length of Need:        oxyCODONE-acetaminophen 5-325 MG per tablet    PERCOCET    180 tablet    Take 1-2 tablets by mouth every 6 hours as needed for pain Max acetaminophen dose: 4000 mg in 24 hours.  Fill on/after 9/10/18    Chronic idiopathic axonal polyneuropathy       VITAMIN D-1000 MAX ST 1000 units Tabs   Generic drug:  cholecalciferol      Take 1,000 Units by mouth daily        VITAMIN E COMPLEX PO      Take by mouth daily        * Notice:  This list has 6 medication(s) that are the same as other medications prescribed for you. Read the directions carefully, and ask your doctor or other care provider to review them with you.

## 2018-07-12 ASSESSMENT — ANXIETY QUESTIONNAIRES: GAD7 TOTAL SCORE: 0

## 2018-07-24 NOTE — PROGRESS NOTES
Patient Information     Patient Name  Regulo Roth MRN  [id number] Sex  Male   1960      Letter by Kaiden Chavez MD at      Author:  Kaiden Chavez MD Service:  (none) Author Type:  (none)    Filed:   Encounter Date:  1/10/2017 Status:  (Other)           Regulo Roth  [address] [place] [address]          January 10, 2017    Dear Mr. Roth:    Feel free to forward this letter on to whomever you feel fit.  I have been your primary care physician since about .  In that time, your mild foot neuropathy has been getting progressively worse.  This seemed to be triggered by complications after your knee debridement surgery in .  Since then, we have had you meet with 3 different neurologists, including one through the [facility].  Also multiple lab test have been done as well as an EMG, but nothing has been determined to be the cause of the peripheral neuropathy.  We have tried many different medications, including narcotics, neurontin, Cymbalta, Lyrica, Elavil, laser treatment, essential oils as well as medical marijuana.  Despite all of this, the symptoms have been progressing and currently it is hard for you to stand for more than 1/2 hour at a time without rest.  All things considered, given the progressive nature of this, I do not feel you will be able to continue to work at anything that requires standing or walking.    Sincerely,    Kaiden Chavez MD

## 2018-08-24 NOTE — TELEPHONE ENCOUNTER
Detail Level: Detailed I cannot do a preop but can perhaps see someone else, including a CNP.  Kaiden Chavez MD on 4/18/2018 at 11:20 AM     Tazorac Counseling:  Patient advised that medication is irritating and drying.  Patient may need to apply sparingly and wash off after an hour before eventually leaving it on overnight.  The patient verbalized understanding of the proper use and possible adverse effects of tazorac.  All of the patient's questions and concerns were addressed. High Dose Vitamin A Counseling: Side effects reviewed, pt to contact office should one occur. Topical Sulfur Applications Counseling: Topical Sulfur Counseling: Patient counseled that this medication may cause skin irritation or allergic reactions.  In the event of skin irritation, the patient was advised to reduce the amount of the drug applied or use it less frequently.   The patient verbalized understanding of the proper use and possible adverse effects of topical sulfur application.  All of the patient's questions and concerns were addressed. Benzoyl Peroxide Pregnancy And Lactation Text: This medication is Pregnancy Category C. It is unknown if benzoyl peroxide is excreted in breast milk. High Dose Vitamin A Pregnancy And Lactation Text: High dose vitamin A therapy is contraindicated during pregnancy and breast feeding. Include Pregnancy/Lactation Warning?: No Dapsone Pregnancy And Lactation Text: This medication is Pregnancy Category C and is not considered safe during pregnancy or breast feeding. Erythromycin Counseling:  I discussed with the patient the risks of erythromycin including but not limited to GI upset, allergic reaction, drug rash, diarrhea, increase in liver enzymes, and yeast infections. Tetracycline Pregnancy And Lactation Text: This medication is Pregnancy Category D and not consider safe during pregnancy. It is also excreted in breast milk. Azithromycin Pregnancy And Lactation Text: This medication is considered safe during pregnancy and is also secreted in breast milk. Tazorac Pregnancy And Lactation Text: This medication is not safe during pregnancy. It is unknown if this medication is excreted in breast milk. Azithromycin Counseling:  I discussed with the patient the risks of azithromycin including but not limited to GI upset, allergic reaction, drug rash, diarrhea, and yeast infections. Topical Retinoid counseling:  Patient advised to apply a pea-sized amount only at bedtime and wait 30 minutes after washing their face before applying.  If too drying, patient may add a non-comedogenic moisturizer. The patient verbalized understanding of the proper use and possible adverse effects of retinoids.  All of the patient's questions and concerns were addressed. Spironolactone Pregnancy And Lactation Text: This medication can cause feminization of the male fetus and should be avoided during pregnancy. The active metabolite is also found in breast milk. Spironolactone Counseling: Patient advised regarding risks of diarrhea, abdominal pain, hyperkalemia, birth defects (for female patients), liver toxicity and renal toxicity. The patient may need blood work to monitor liver and kidney function and potassium levels while on therapy. The patient verbalized understanding of the proper use and possible adverse effects of spironolactone.  All of the patient's questions and concerns were addressed. Bactrim Counseling:  I discussed with the patient the risks of sulfa antibiotics including but not limited to GI upset, allergic reaction, drug rash, diarrhea, dizziness, photosensitivity, and yeast infections.  Rarely, more serious reactions can occur including but not limited to aplastic anemia, agranulocytosis, methemoglobinemia, blood dyscrasias, liver or kidney failure, lung infiltrates or desquamative/blistering drug rashes. Erythromycin Pregnancy And Lactation Text: This medication is Pregnancy Category B and is considered safe during pregnancy. It is also excreted in breast milk. Dapsone Counseling: I discussed with the patient the risks of dapsone including but not limited to hemolytic anemia, agranulocytosis, rashes, methemoglobinemia, kidney failure, peripheral neuropathy, headaches, GI upset, and liver toxicity.  Patients who start dapsone require monitoring including baseline LFTs and weekly CBCs for the first month, then every month thereafter.  The patient verbalized understanding of the proper use and possible adverse effects of dapsone.  All of the patient's questions and concerns were addressed. Topical Retinoid Pregnancy And Lactation Text: This medication is Pregnancy Category C. It is unknown if this medication is excreted in breast milk. Bactrim Pregnancy And Lactation Text: This medication is Pregnancy Category D and is known to cause fetal risk.  It is also excreted in breast milk. Doxycycline Counseling:  Patient counseled regarding possible photosensitivity and increased risk for sunburn.  Patient instructed to avoid sunlight, if possible.  When exposed to sunlight, patients should wear protective clothing, sunglasses, and sunscreen.  The patient was instructed to call the office immediately if the following severe adverse effects occur:  hearing changes, easy bruising/bleeding, severe headache, or vision changes.  The patient verbalized understanding of the proper use and possible adverse effects of doxycycline.  All of the patient's questions and concerns were addressed. Birth Control Pills Pregnancy And Lactation Text: This medication should be avoided if pregnant and for the first 30 days post-partum. Birth Control Pills Counseling: Birth Control Pill Counseling: I discussed with the patient the potential side effects of OCPs including but not limited to increased risk of stroke, heart attack, thrombophlebitis, deep venous thrombosis, hepatic adenomas, breast changes, GI upset, headaches, and depression.  The patient verbalized understanding of the proper use and possible adverse effects of OCPs. All of the patient's questions and concerns were addressed. Topical Sulfur Applications Pregnancy And Lactation Text: This medication is Pregnancy Category C and has an unknown safety profile during pregnancy. It is unknown if this topical medication is excreted in breast milk. Tetracycline Counseling: Patient counseled regarding possible photosensitivity and increased risk for sunburn.  Patient instructed to avoid sunlight, if possible.  When exposed to sunlight, patients should wear protective clothing, sunglasses, and sunscreen.  The patient was instructed to call the office immediately if the following severe adverse effects occur:  hearing changes, easy bruising/bleeding, severe headache, or vision changes.  The patient verbalized understanding of the proper use and possible adverse effects of tetracycline.  All of the patient's questions and concerns were addressed. Patient understands to avoid pregnancy while on therapy due to potential birth defects. Isotretinoin Counseling: Patient should get monthly blood tests, not donate blood, not drive at night if vision affected, not share medication, and not undergo elective surgery for 6 months after tx completed. Side effects reviewed, pt to contact office should one occur. Isotretinoin Pregnancy And Lactation Text: This medication is Pregnancy Category X and is considered extremely dangerous during pregnancy. It is unknown if it is excreted in breast milk. Doxycycline Pregnancy And Lactation Text: This medication is Pregnancy Category D and not consider safe during pregnancy. It is also excreted in breast milk but is considered safe for shorter treatment courses. Topical Clindamycin Pregnancy And Lactation Text: This medication is Pregnancy Category B and is considered safe during pregnancy. It is unknown if it is excreted in breast milk. Topical Clindamycin Counseling: Patient counseled that this medication may cause skin irritation or allergic reactions.  In the event of skin irritation, the patient was advised to reduce the amount of the drug applied or use it less frequently.   The patient verbalized understanding of the proper use and possible adverse effects of clindamycin.  All of the patient's questions and concerns were addressed. Detail Level: Zone Minocycline Counseling: Patient advised regarding possible photosensitivity and discoloration of the teeth, skin, lips, tongue and gums.  Patient instructed to avoid sunlight, if possible.  When exposed to sunlight, patients should wear protective clothing, sunglasses, and sunscreen.  The patient was instructed to call the office immediately if the following severe adverse effects occur:  hearing changes, easy bruising/bleeding, severe headache, or vision changes.  The patient verbalized understanding of the proper use and possible adverse effects of minocycline.  All of the patient's questions and concerns were addressed. Benzoyl Peroxide Counseling: Patient counseled that medicine may cause skin irritation and bleach clothing.  In the event of skin irritation, the patient was advised to reduce the amount of the drug applied or use it less frequently.   The patient verbalized understanding of the proper use and possible adverse effects of benzoyl peroxide.  All of the patient's questions and concerns were addressed. Detail Level: Simple

## 2018-10-05 ENCOUNTER — OFFICE VISIT (OUTPATIENT)
Dept: FAMILY MEDICINE | Facility: OTHER | Age: 58
End: 2018-10-05
Attending: FAMILY MEDICINE
Payer: COMMERCIAL

## 2018-10-05 VITALS — SYSTOLIC BLOOD PRESSURE: 126 MMHG | DIASTOLIC BLOOD PRESSURE: 70 MMHG | RESPIRATION RATE: 16 BRPM

## 2018-10-05 DIAGNOSIS — G60.9 CHRONIC IDIOPATHIC AXONAL POLYNEUROPATHY: ICD-10-CM

## 2018-10-05 PROCEDURE — 80307 DRUG TEST PRSMV CHEM ANLYZR: CPT | Performed by: FAMILY MEDICINE

## 2018-10-05 PROCEDURE — 99213 OFFICE O/P EST LOW 20 MIN: CPT | Performed by: FAMILY MEDICINE

## 2018-10-05 RX ORDER — OXYCODONE AND ACETAMINOPHEN 5; 325 MG/1; MG/1
1-2 TABLET ORAL EVERY 6 HOURS PRN
Qty: 180 TABLET | Refills: 0 | Status: SHIPPED | OUTPATIENT
Start: 2018-10-05 | End: 2018-10-05

## 2018-10-05 RX ORDER — OXYCODONE AND ACETAMINOPHEN 5; 325 MG/1; MG/1
1-2 TABLET ORAL EVERY 6 HOURS PRN
Qty: 180 TABLET | Refills: 0 | Status: SHIPPED | OUTPATIENT
Start: 2018-10-05 | End: 2019-01-04

## 2018-10-05 ASSESSMENT — ENCOUNTER SYMPTOMS
NUMBNESS: 1
PARESTHESIAS: 1
SLEEP DISTURBANCE: 1

## 2018-10-05 ASSESSMENT — PATIENT HEALTH QUESTIONNAIRE - PHQ9: 5. POOR APPETITE OR OVEREATING: NOT AT ALL

## 2018-10-05 ASSESSMENT — ANXIETY QUESTIONNAIRES
3. WORRYING TOO MUCH ABOUT DIFFERENT THINGS: NOT AT ALL
IF YOU CHECKED OFF ANY PROBLEMS ON THIS QUESTIONNAIRE, HOW DIFFICULT HAVE THESE PROBLEMS MADE IT FOR YOU TO DO YOUR WORK, TAKE CARE OF THINGS AT HOME, OR GET ALONG WITH OTHER PEOPLE: NOT DIFFICULT AT ALL
2. NOT BEING ABLE TO STOP OR CONTROL WORRYING: NOT AT ALL
5. BEING SO RESTLESS THAT IT IS HARD TO SIT STILL: NOT AT ALL
7. FEELING AFRAID AS IF SOMETHING AWFUL MIGHT HAPPEN: NOT AT ALL
6. BECOMING EASILY ANNOYED OR IRRITABLE: NOT AT ALL
GAD7 TOTAL SCORE: 0
1. FEELING NERVOUS, ANXIOUS, OR ON EDGE: NOT AT ALL

## 2018-10-05 ASSESSMENT — PAIN SCALES - GENERAL: PAINLEVEL: SEVERE PAIN (7)

## 2018-10-05 NOTE — MR AVS SNAPSHOT
After Visit Summary   10/5/2018    Regulo Roth    MRN: [id number]           Patient Information     Date Of Birth          1960        Visit Information        Provider Department      10/5/2018 2:15 PM Kaiden Chavez MD [facility]        Today's Diagnoses     Chronic idiopathic axonal polyneuropathy           Follow-ups after your visit        Follow-up notes from your care team     Return in about 3 months (around 1/5/2019).      Who to contact     If you have questions or need follow up information about today's clinic visit or your schedule please contact [facility] directly at [phone].  Normal or non-critical lab and imaging results will be communicated to you by MyChart, letter or phone within 4 business days after the clinic has received the results. If you do not hear from us within 7 days, please contact the clinic through [facility] or phone. If you have a critical or abnormal lab result, we will notify you by phone as soon as possible.  Submit refill requests through [employer] or call your pharmacy and they will forward the refill request to us. Please allow 3 business days for your refill to be completed.          Additional Information About Your Visit        MyChart Information     [employer] gives you secure access to your electronic health record. If you see a primary care provider, you can also send messages to your care team and make appointments. If you have questions, please call your primary care clinic.  If you do not have a primary care provider, please call [phone] and they will assist you.        Care EveryWhere ID     This is your Care EveryWhere ID. This could be used by other organizations to access your [facility] medical records  [id number]        Your Vitals Were     Respirations                   16            Blood Pressure from Last 3 Encounters:   10/05/18 126/70   07/11/18 128/72   04/17/18 142/68    Weight  from Last 3 Encounters:   07/11/18 290 lb (131.5 kg)   04/17/18 302 lb 12.8 oz (137.3 kg)   01/23/18 299 lb 9.6 oz (135.9 kg)              We Performed the Following     Drug  Screen Comprehensive , Urine with Reported Meds (MedTox) (Pain Care Package)          Today's Medication Changes          These changes are accurate as of 10/5/18  2:15 PM.  If you have any questions, ask your nurse or doctor.               Start taking these medicines.        Dose/Directions    oxyCODONE-acetaminophen 5-325 MG per tablet   Commonly known as:  PERCOCET   Used for:  Chronic idiopathic axonal polyneuropathy   Started by:  Kaiden Chavez MD        Dose:  1-2 tablet   Take 1-2 tablets by mouth every 6 hours as needed for pain Max acetaminophen dose: 4000 mg in 24 hours.  Fill on/after 11/07/18   Quantity:  180 tablet   Refills:  0         Stop taking these medicines if you haven't already. Please contact your care team if you have questions.     doxycycline 100 MG capsule   Commonly known as:  VIBRAMYCIN   Stopped by:  Kaiden Chavez MD                Where to get your medicines      Some of these will need a paper prescription and others can be bought over the counter.  Ask your nurse if you have questions.     Bring a paper prescription for each of these medications     oxyCODONE-acetaminophen 5-325 MG per tablet               Information about OPIOIDS     PRESCRIPTION OPIOIDS: WHAT YOU NEED TO KNOW   We gave you an opioid (narcotic) pain medicine. It is important to manage your pain, but opioids are not always the best choice. You should first try all the other options your care team gave you. Take this medicine for as short a time (and as few doses) as possible.    Some activities can increase your pain, such as bandage changes or therapy sessions. It may help to take your pain medicine 30 to 60 minutes before these activities. Reduce your stress by getting enough sleep, working on hobbies you enjoy and practicing relaxation  or meditation. Talk to your care team about ways to manage your pain beyond prescription opioids.    These medicines have risks:    DO NOT drive when on new or higher doses of pain medicine. These medicines can affect your alertness and reaction times, and you could be arrested for driving under the influence (DUI). If you need to use opioids long-term, talk to your care team about driving.    DO NOT operate heavy machinery    DO NOT do any other dangerous activities while taking these medicines.    DO NOT drink any alcohol while taking these medicines.     If the opioid prescribed includes acetaminophen, DO NOT take with any other medicines that contain acetaminophen. Read all labels carefully. Look for the word  acetaminophen  or  Tylenol.  Ask your pharmacist if you have questions or are unsure.    You can get addicted to pain medicines, especially if you have a history of addiction (chemical, alcohol or substance dependence). Talk to your care team about ways to reduce this risk.    All opioids tend to cause constipation. Drink plenty of water and eat foods that have a lot of fiber, such as fruits, vegetables, prune juice, apple juice and high-fiber cereal. Take a laxative (Miralax, milk of magnesia, Colace, Senna) if you don t move your bowels at least every other day. Other side effects include upset stomach, sleepiness, dizziness, throwing up, tolerance (needing more of the medicine to have the same effect), physical dependence and slowed breathing.    Store your pills in a secure place, locked if possible. We will not replace any lost or stolen medicine. If you don t finish your medicine, please throw away (dispose) as directed by your pharmacist. The [place] [employer]ution [employer] has more information about safe disposal: https://www.pca.[facility].[place].us/living-green/managing-unwanted-medications         Primary Care Provider Office Phone # Fax #    Kaiden Chavez [phone] [id number] [facility] [id number]        Equal Access to Services     JAIME PERSAUD : Hadii aad ku hadwallycarlos Vazquez, waaletheachloe bright, jennifermayra adamegeorginachloe coppola, rustam simsflorenceyann mcleod. So [facility] [phone].    ATENCIÓN: Si habla español, tiene a house disposición servicios gratuitos de asistencia lingüística. Llame al [phone].    We comply with applicable federal civil rights laws and [place] laws. We do not discriminate on the basis of race, color, national origin, age, disability, sex, sexual orientation, or gender identity.            Thank you!     Thank you for choosing [facility]  for your care. Our goal is always to provide you with excellent care. Hearing back from our patients is one way we can continue to improve our services. Please take a few minutes to complete the written survey that you may receive in the mail after your visit with us. Thank you!             Your Updated Medication List - Protect others around you: Learn how to safely use, store and throw away your medicines at www.disposemymeds.org.          This list is accurate as of 10/5/18  2:15 PM.  Always use your most recent med list.                   Brand Name Dispense Instructions for use Diagnosis    * albuterol 108 (90 Base) MCG/ACT inhaler    PROAIR HFA/PROVENTIL HFA/VENTOLIN HFA     Inhale 2 puffs into the lungs every 4 hours as needed for shortness of breath / dyspnea or wheezing        * albuterol 108 (90 Base) MCG/ACT inhaler    PROAIR HFA/PROVENTIL HFA/VENTOLIN HFA     Inhale 2 puffs into the lungs every 4 hours as needed        Alpha Lipoic Acid 200 MG Caps     90 capsule    Take 200 mg by mouth 3 times daily    Hereditary and idiopathic peripheral neuropathy       * aspirin 81 MG tablet      Take 81 mg by mouth daily        * ASPIRIN 81 PO      Take 81 mg by mouth daily with food        calcium carbonate 500 mg-vitamin D 200 units 500-200 MG-UNIT per tablet    OSCAL with D;OYSTER  SHELL CALCIUM     Take 2 tablets by mouth daily        CALCIUM PO      Take 500 mg by mouth daily        fish oil-omega-3 fatty acids 1000 MG capsule      Take 1 capsule by mouth        fluocinonide 0.05 % solution    LIDEX          fluticasone 50 MCG/ACT spray    FLONASE     Spray 2 sprays into both nostrils daily        mometasone 0.1 % ointment    ELOCON          * NEXIUM PO      Take 40 mg by mouth every morning (before breakfast)        * esomeprazole 40 MG CR capsule    nexIUM     Take 40 mg by mouth daily Before a meal        order for DME      Equipment being ordered: CPAP        order for DME      CPAP, heated humidifier, mask, headgear, filters and tubing. For home use. Pressure:  8   Length of Need:        oxyCODONE-acetaminophen 5-325 MG per tablet    PERCOCET    180 tablet    Take 1-2 tablets by mouth every 6 hours as needed for pain Max acetaminophen dose: 4000 mg in 24 hours.  Fill on/after 11/07/18    Chronic idiopathic axonal polyneuropathy       VITAMIN D-1000 MAX ST 1000 units Tabs   Generic drug:  cholecalciferol      Take 1,000 Units by mouth daily        VITAMIN E COMPLEX PO      Take by mouth daily        * Notice:  This list has 6 medication(s) that are the same as other medications prescribed for you. Read the directions carefully, and ask your doctor or other care provider to review them with you.

## 2018-10-05 NOTE — PROGRESS NOTES
SUBJECTIVE:   Regulo Roth is a 58 year old male who presents to clinic today for the following health issues:    HPI  Follow up on pain meds.  He is feeling about the same. Still with significant pains into his feet.  Finds just partial relief from the percocet.  Last tox screen was 4/17.  No other meds or drugs in this apart from the THC.  Has had his implant reprogrammed 5 times.  It helps with some of the stabbing pain, but not the burning.  Feels like he is always walking on sharp rocks.  He has failed neurontin, lyrica, cymbalta, Elavil.      Patient Active Problem List    Diagnosis Date Noted     Epidural lipomatosis 08/04/2017     Priority: Medium     Chronic idiopathic axonal polyneuropathy 06/21/2017     Priority: Medium     Neuropathy 06/17/2016     Priority: Medium     Sleep apnea, obstructive 06/30/2015     Priority: Medium     Pain medication agreement 09/04/2014     Priority: Medium     Overview:   Updated 6/3/2016       GERD (gastroesophageal reflux disease) 11/25/2013     Priority: Medium     Health care maintenance 11/25/2013     Priority: Medium     Vitamin D deficiency 11/15/2013     Priority: Medium     Paresthesia of bilateral legs 11/11/2013     Priority: Medium     Rhinitis medicamentosa 03/08/2013     Priority: Medium     Asthma 02/22/2012     Priority: Medium     Eye irritation 10/06/2011     Priority: Medium     Osteoarthritis, knee 06/22/2011     Priority: Medium     Plantar fascial fibromatosis 06/22/2011     Priority: Medium     Seborrheic dermatitis 06/22/2011     Priority: Medium     Past Surgical History:   Procedure Laterality Date     ARTHROPLASTY KNEE      August 2012,left - became infected require debridment     ARTHROSCOPY KNEE      1981,acl and medial meniscus repair [Other]     COLONOSCOPY      12/9/13,F/U 2023     ESOPHAGOSCOPY, GASTROSCOPY, DUODENOSCOPY (EGD), COMBINED      12/9/13,EGD     FUSION CERVICAL ANTERIOR ONE LEVEL      c6-7     INSERT STIMULATOR AND LEADS  INTERNAL DORSAL COLUMN  04/2018     OTHER SURGICAL HISTORY      ,HERNIA REPAIR     OTHER SURGICAL HISTORY      ,HERNIA REPAIR     OTHER SURGICAL HISTORY      2012,205436,DEBRIDEMENT,Left,knee  infection following surgery     OTHER SURGICAL HISTORY      2012,27599.3,HI KNEE MANIPULATION     OTHER SURGICAL HISTORY      206904,MULTIPLE SLEEP LATENCY TEST WITHOUT CPAP,uses C PAP     TONSILLECTOMY, ADENOIDECTOMY, COMBINED      No Comments Provided     Social History   Substance Use Topics     Smoking status: Former Smoker     Packs/day: 1.00     Years: 18.00     Smokeless tobacco: Never Used     Alcohol use 5.0 oz/week      Comment: Alcoholic Drinks/day: occ     Current Outpatient Prescriptions   Medication Sig Dispense Refill     albuterol (PROAIR HFA, PROVENTIL HFA, VENTOLIN HFA) 108 (90 BASE) MCG/ACT inhaler Inhale 2 puffs into the lungs every 4 hours as needed for shortness of breath / dyspnea or wheezing       albuterol (PROAIR HFA/PROVENTIL HFA/VENTOLIN HFA) 108 (90 BASE) MCG/ACT Inhaler Inhale 2 puffs into the lungs every 4 hours as needed       Alpha Lipoic Acid 200 MG CAPS Take 200 mg by mouth 3 times daily 90 capsule 3     aspirin 81 MG tablet Take 81 mg by mouth daily       ASPIRIN 81 PO Take 81 mg by mouth daily with food       calcium carb 1250 mg, 500 mg [place],/vitamin D 200 units (OSCAL WITH D) 500-200 MG-UNIT per tablet Take 2 tablets by mouth daily       CALCIUM PO Take 500 mg by mouth daily       cholecalciferol (VITAMIN D-1000 MAX ST) 1000 UNITS TABS Take 1,000 Units by mouth daily       esomeprazole (NEXIUM) 40 MG CR capsule Take 40 mg by mouth daily Before a meal       Esomeprazole Magnesium (NEXIUM PO) Take 40 mg by mouth every morning (before breakfast)       fish oil-omega-3 fatty acids 1000 MG capsule Take 1 capsule by mouth       fluocinonide (LIDEX) 0.05 % solution        fluticasone (FLONASE) 50 MCG/ACT spray Spray 2 sprays into both nostrils daily       mometasone (ELOCON) 0.1 %  ointment        order for DME CPAP, heated humidifier, mask, headgear, filters and tubing. For home use. Pressure:  8   Length of Need:       order for DME Equipment being ordered: CPAP       oxyCODONE-acetaminophen (PERCOCET) 5-325 MG per tablet Take 1-2 tablets by mouth every 6 hours as needed for pain Max acetaminophen dose: 4000 mg in 24 hours.  Fill on/after 11/07/18 180 tablet 0     VITAMIN E COMPLEX PO Take by mouth daily       No Known Allergies    Review of Systems   Musculoskeletal: Negative for gait problem.   Neurological: Positive for numbness and paresthesias.   Psychiatric/Behavioral: Positive for sleep disturbance.        OBJECTIVE:     /70 (BP Location: Right arm, Patient Position: Sitting, Cuff Size: Adult Regular)  Resp 16  There is no height or weight on file to calculate BMI.  Physical Exam   Constitutional: He is oriented to person, place, and time. He appears well-developed and well-nourished.   HENT:   Head: Normocephalic and atraumatic.   Neurological: He is alert and oriented to person, place, and time.   Psychiatric: He has a normal mood and affect. His behavior is normal. Thought content normal.       Diagnostic Test Results:  none     ASSESSMENT/PLAN:         (G60.9) Chronic idiopathic axonal polyneuropathy  Comment: stable  Plan: oxyCODONE-acetaminophen (PERCOCET) 5-325 MG per        tablet, Drug  Screen Comprehensive , Urine with        Reported Meds (MedTox) (Pain Care Package),         DISCONTINUED: oxyCODONE-acetaminophen         (PERCOCET) 5-325 MG per tablet, DISCONTINUED:         oxyCODONE-acetaminophen (PERCOCET) 5-325 MG per        tablet         reviewed, no significant changes.  Follow up in 3 months.        Kaiden Chavez MD  [facility]

## 2018-10-06 ASSESSMENT — ANXIETY QUESTIONNAIRES: GAD7 TOTAL SCORE: 0

## 2018-10-12 LAB — PAIN DRUG SCR UR W RPTD MEDS: NORMAL

## 2018-10-17 ENCOUNTER — ALLIED HEALTH/NURSE VISIT (OUTPATIENT)
Dept: FAMILY MEDICINE | Facility: OTHER | Age: 58
End: 2018-10-17
Attending: FAMILY MEDICINE
Payer: COMMERCIAL

## 2018-10-17 DIAGNOSIS — Z23 NEED FOR IMMUNIZATION AGAINST INFLUENZA: Primary | ICD-10-CM

## 2018-10-17 DIAGNOSIS — Z23 NEED FOR PROPHYLACTIC VACCINATION AND INOCULATION AGAINST INFLUENZA: ICD-10-CM

## 2018-10-17 PROCEDURE — 90686 IIV4 VACC NO PRSV 0.5 ML IM: CPT

## 2018-10-17 PROCEDURE — 90471 IMMUNIZATION ADMIN: CPT

## 2018-10-17 NOTE — PROGRESS NOTES

## 2018-10-17 NOTE — MR AVS SNAPSHOT
After Visit Summary   10/17/2018    eRgulo Roth    MRN: [id number]           Patient Information     Date Of Birth          1960        Visit Information        Provider Department      10/17/2018 10:20 AM GH FLU [facility]        Today's Diagnoses     Need for immunization against influenza    -  1    Need for prophylactic vaccination and inoculation against influenza           Follow-ups after your visit        Who to contact     If you have questions or need follow up information about today's clinic visit or your schedule please contact [facility] directly at [phone].  Normal or non-critical lab and imaging results will be communicated to you by Seesawhart, letter or phone within 4 business days after the clinic has received the results. If you do not hear from us within 7 days, please contact the clinic through Image[employer] or phone. If you have a critical or abnormal lab result, we will notify you by phone as soon as possible.  Submit refill requests through Image[employer] or call your pharmacy and they will forward the refill request to us. Please allow 3 business days for your refill to be completed.          Additional Information About Your Visit        MyChart Information     [employer] gives you secure access to your electronic health record. If you see a primary care provider, you can also send messages to your care team and make appointments. If you have questions, please call your primary care clinic.  If you do not have a primary care provider, please call [phone] and they will assist you.        Care EveryWhere ID     This is your Care EveryWhere ID. This could be used by other organizations to access your Given medical records  [id number]         Blood Pressure from Last 3 Encounters:   10/05/18 126/70   07/11/18 128/72   04/17/18 142/68    Weight from Last 3 Encounters:   07/11/18 290 lb (131.5 kg)   04/17/18 302 lb 12.8 oz (137.3  kg)   01/23/18 299 lb 9.6 oz (135.9 kg)              We Performed the Following     GH IMM-  HC FLU VAC PRESRV FREE QUAD SPLIT VIR 3+YRS IM     Vaccine Administration, Initial [[id number]]        Primary Care Provider Office Phone # Fax #    Kaiden Chavez [phone]       [address]  [place] [address]        Equal Access to Services     [facility]: Hadii aad ku hadasho Soomaali, waaxda luqadaha, qaybta kaalmada adeegyada, waxay idiin hayaan adeeg presleyyann lamanish . So [facility] [phone].    ATENCIÓN: Si reinala alexia, tiene a house disposición servicios gratuitos de asistencia lingüística. Llame al [phone].    We comply with applicable federal civil rights laws and [place] laws. We do not discriminate on the basis of race, color, national origin, age, disability, sex, sexual orientation, or gender identity.            Thank you!     Thank you for choosing [facility]  for your care. Our goal is always to provide you with excellent care. Hearing back from our patients is one way we can continue to improve our services. Please take a few minutes to complete the written survey that you may receive in the mail after your visit with us. Thank you!             Your Updated Medication List - Protect others around you: Learn how to safely use, store and throw away your medicines at www.disposemymeds.org.          This list is accurate as of 10/17/18 10:31 AM.  Always use your most recent med list.                   Brand Name Dispense Instructions for use Diagnosis    * albuterol 108 (90 Base) MCG/ACT inhaler    PROAIR HFA/PROVENTIL HFA/VENTOLIN HFA     Inhale 2 puffs into the lungs every 4 hours as needed for shortness of breath / dyspnea or wheezing        * albuterol 108 (90 Base) MCG/ACT inhaler    PROAIR HFA/PROVENTIL HFA/VENTOLIN HFA     Inhale 2 puffs into the lungs every 4 hours as needed        Alpha Lipoic Acid 200 MG Caps     90 capsule    Take 200 mg by mouth  3 times daily    Hereditary and idiopathic peripheral neuropathy       * aspirin 81 MG tablet      Take 81 mg by mouth daily        * ASPIRIN 81 PO      Take 81 mg by mouth daily with food        calcium carbonate 500 mg-vitamin D 200 units 500-200 MG-UNIT per tablet    OSCAL with D;OYSTER SHELL CALCIUM     Take 2 tablets by mouth daily        CALCIUM PO      Take 500 mg by mouth daily        fish oil-omega-3 fatty acids 1000 MG capsule      Take 1 capsule by mouth        fluocinonide 0.05 % solution    LIDEX          fluticasone 50 MCG/ACT spray    FLONASE     Spray 2 sprays into both nostrils daily        mometasone 0.1 % ointment    ELOCON          * NEXIUM PO      Take 40 mg by mouth every morning (before breakfast)        * esomeprazole 40 MG CR capsule    nexIUM     Take 40 mg by mouth daily Before a meal        order for DME      Equipment being ordered: CPAP        order for DME      CPAP, heated humidifier, mask, headgear, filters and tubing. For home use. Pressure:  8   Length of Need:        oxyCODONE-acetaminophen 5-325 MG per tablet    PERCOCET    180 tablet    Take 1-2 tablets by mouth every 6 hours as needed for pain Max acetaminophen dose: 4000 mg in 24 hours.  Fill on/after 11/07/18    Chronic idiopathic axonal polyneuropathy       VITAMIN D-1000 MAX ST 1000 units Tabs   Generic drug:  cholecalciferol      Take 1,000 Units by mouth daily        VITAMIN E COMPLEX PO      Take by mouth daily        * Notice:  This list has 6 medication(s) that are the same as other medications prescribed for you. Read the directions carefully, and ask your doctor or other care provider to review them with you.

## 2018-12-17 DIAGNOSIS — K21.9 GASTROESOPHAGEAL REFLUX DISEASE, ESOPHAGITIS PRESENCE NOT SPECIFIED: Primary | ICD-10-CM

## 2018-12-17 RX ORDER — ESOMEPRAZOLE MAGNESIUM 40 MG/1
CAPSULE, DELAYED RELEASE ORAL
Qty: 90 CAPSULE | Refills: 3 | Status: SHIPPED | OUTPATIENT
Start: 2018-12-17 | End: 2019-12-01

## 2019-01-04 ENCOUNTER — OFFICE VISIT (OUTPATIENT)
Dept: FAMILY MEDICINE | Facility: OTHER | Age: 59
End: 2019-01-04
Attending: FAMILY MEDICINE
Payer: COMMERCIAL

## 2019-01-04 VITALS
DIASTOLIC BLOOD PRESSURE: 84 MMHG | RESPIRATION RATE: 16 BRPM | WEIGHT: 292 LBS | SYSTOLIC BLOOD PRESSURE: 136 MMHG | HEIGHT: 70 IN | BODY MASS INDEX: 41.8 KG/M2 | TEMPERATURE: 97.9 F | HEART RATE: 80 BPM

## 2019-01-04 DIAGNOSIS — H69.92 DYSFUNCTION OF LEFT EUSTACHIAN TUBE: Primary | ICD-10-CM

## 2019-01-04 DIAGNOSIS — G60.9 CHRONIC IDIOPATHIC AXONAL POLYNEUROPATHY: ICD-10-CM

## 2019-01-04 PROCEDURE — 99214 OFFICE O/P EST MOD 30 MIN: CPT | Performed by: FAMILY MEDICINE

## 2019-01-04 RX ORDER — OXYCODONE AND ACETAMINOPHEN 5; 325 MG/1; MG/1
1-2 TABLET ORAL EVERY 6 HOURS PRN
Qty: 180 TABLET | Refills: 0 | Status: SHIPPED | OUTPATIENT
Start: 2019-01-04 | End: 2019-01-04

## 2019-01-04 RX ORDER — OXYCODONE AND ACETAMINOPHEN 5; 325 MG/1; MG/1
1-2 TABLET ORAL EVERY 6 HOURS PRN
Qty: 180 TABLET | Refills: 0 | Status: SHIPPED | OUTPATIENT
Start: 2019-01-04 | End: 2019-04-01

## 2019-01-04 ASSESSMENT — ENCOUNTER SYMPTOMS
PARESTHESIAS: 1
NUMBNESS: 1
COUGH: 1
FATIGUE: 0
BACK PAIN: 0
RHINORRHEA: 0

## 2019-01-04 ASSESSMENT — MIFFLIN-ST. JEOR: SCORE: 2155.75

## 2019-01-04 NOTE — NURSING NOTE
No LMP for male patient.  Medication Reconciliation: complete    Tashia Dubon LPN  1/4/2019 2:18 PM

## 2019-01-04 NOTE — PROGRESS NOTES
SUBJECTIVE:   Regulo Roth is a 58 year old male who presents to clinic today for the following health issues:    HPI  Follow up on his pain meds and also has some left ear symptoms.    Pain remains severe.  Only in his feet.  The spinal implant helps a little for the stabbing pains.  Burning pain is not helped.  Feels like he is walking on sharp rocks always.  Does not like to have sheets or wind even on them.  Uses medical THC sometimes, mostly just to help with sleep.  Tox screen 10/18 was as expected.    Uri symptoms for about 1 week.  Cough.  Pain in the left ear.  No fevers.  Nasal congestion without rhinorrhea.     Patient Active Problem List    Diagnosis Date Noted     Epidural lipomatosis 08/04/2017     Priority: Medium     Chronic idiopathic axonal polyneuropathy 06/21/2017     Priority: Medium     Neuropathy 06/17/2016     Priority: Medium     Sleep apnea, obstructive 06/30/2015     Priority: Medium     Pain medication agreement 09/04/2014     Priority: Medium     Overview:   Updated 6/3/2016       GERD (gastroesophageal reflux disease) 11/25/2013     Priority: Medium     Health care maintenance 11/25/2013     Priority: Medium     Vitamin D deficiency 11/15/2013     Priority: Medium     Paresthesia of bilateral legs 11/11/2013     Priority: Medium     Rhinitis medicamentosa 03/08/2013     Priority: Medium     Asthma 02/22/2012     Priority: Medium     Eye irritation 10/06/2011     Priority: Medium     Osteoarthritis, knee 06/22/2011     Priority: Medium     Plantar fascial fibromatosis 06/22/2011     Priority: Medium     Seborrheic dermatitis 06/22/2011     Priority: Medium     Past Surgical History:   Procedure Laterality Date     ARTHROPLASTY KNEE      August 2012,left - became infected require debridment     ARTHROSCOPY KNEE      1981,acl and medial meniscus repair [Other]     COLONOSCOPY  12/09/2013 12/9/13,F/U 2023     ESOPHAGOSCOPY, GASTROSCOPY, DUODENOSCOPY (EGD), COMBINED       12/9/13,EGD     FUSION CERVICAL ANTERIOR ONE LEVEL      c6-7     INSERT STIMULATOR AND LEADS INTERNAL DORSAL COLUMN  04/2018     OTHER SURGICAL HISTORY      ,HERNIA REPAIR     OTHER SURGICAL HISTORY      ,HERNIA REPAIR     OTHER SURGICAL HISTORY      2012,205436,DEBRIDEMENT,Left,knee  infection following surgery     OTHER SURGICAL HISTORY      2012,27599.3,VT KNEE MANIPULATION     OTHER SURGICAL HISTORY      206904,MULTIPLE SLEEP LATENCY TEST WITHOUT CPAP,uses C PAP     TONSILLECTOMY, ADENOIDECTOMY, COMBINED      No Comments Provided     Social History     Tobacco Use     Smoking status: Former Smoker     Packs/day: 1.00     Years: 18.00     Pack years: 18.00     Smokeless tobacco: Never Used   Substance Use Topics     Alcohol use: Yes     Alcohol/week: 5.0 oz     Comment: Alcoholic Drinks/day: occ     Current Outpatient Medications   Medication Sig Dispense Refill     albuterol (PROAIR HFA/PROVENTIL HFA/VENTOLIN HFA) 108 (90 BASE) MCG/ACT Inhaler Inhale 2 puffs into the lungs every 4 hours as needed       Alpha Lipoic Acid 200 MG CAPS Take 200 mg by mouth 3 times daily 90 capsule 3     aspirin 81 MG tablet Take 81 mg by mouth daily       cholecalciferol (VITAMIN D-1000 MAX ST) 1000 UNITS TABS Take 1,000 Units by mouth daily       esomeprazole (NEXIUM) 40 MG DR capsule TAKE 1 CAPSULE BY MOUTH ONCE DAILY BEFORE A MEAL. 90 capsule 3     Esomeprazole Magnesium (NEXIUM PO) Take 40 mg by mouth every morning (before breakfast)       fluocinonide (LIDEX) 0.05 % solution        fluticasone (FLONASE) 50 MCG/ACT spray Spray 2 sprays into both nostrils daily       mometasone (ELOCON) 0.1 % ointment        order for DME CPAP, heated humidifier, mask, headgear, filters and tubing. For home use. Pressure:  8   Length of Need:       oxyCODONE-acetaminophen (PERCOCET) 5-325 MG tablet Take 1-2 tablets by mouth every 6 hours as needed for pain Max acetaminophen dose: 4000 mg in 24 hours.  Fill on/after 3/03/19 180 tablet 0  "    VITAMIN E COMPLEX PO Take by mouth daily       No Known Allergies    Review of Systems   Constitutional: Negative for fatigue.   HENT: Positive for congestion and ear pain. Negative for rhinorrhea.    Respiratory: Positive for cough.    Musculoskeletal: Negative for back pain.   Neurological: Positive for numbness and paresthesias.        OBJECTIVE:     /84   Pulse 80   Temp 97.9  F (36.6  C)   Resp 16   Ht 1.778 m (5' 10\")   Wt 132.5 kg (292 lb)   BMI 41.90 kg/m    Body mass index is 41.9 kg/m .  Physical Exam   Constitutional: He is oriented to person, place, and time. He appears well-developed. No distress.   HENT:   Head: Normocephalic and atraumatic.   Left tympanic membrane with mild retraction but no effusion   Eyes: EOM are normal. Pupils are equal, round, and reactive to light.   Pulmonary/Chest: Effort normal. No stridor. No respiratory distress. He has no wheezes.   Neurological: He is alert and oriented to person, place, and time. He displays normal reflexes. No cranial nerve deficit. Coordination normal.   Skin: Skin is warm and dry. No rash noted. He is not diaphoretic. No erythema.       Diagnostic Test Results:  none     ASSESSMENT/PLAN:         (H69.82) Dysfunction of left eustachian tube  (primary encounter diagnosis)  Comment: typically this is self limited.  Plan: follow up as needed     (G60.9) Chronic idiopathic axonal polyneuropathy  Comment: no significant change  Plan: oxyCODONE-acetaminophen (PERCOCET) 5-325 MG         tablet, DISCONTINUED: oxyCODONE-acetaminophen         (PERCOCET) 5-325 MG tablet, DISCONTINUED:         oxyCODONE-acetaminophen (PERCOCET) 5-325 MG         tablet         reviewed and stable.  Refilled meds and follow up in 3 months.        Kaiden Chavez MD  [facility]    "

## 2019-03-20 ENCOUNTER — TELEPHONE (OUTPATIENT)
Dept: FAMILY MEDICINE | Facility: OTHER | Age: 59
End: 2019-03-20

## 2019-03-20 NOTE — TELEPHONE ENCOUNTER
[place] 27th only has percocet to last until 2nd could not get appt until 4/5/19 he will be a few days short please call --he said he might be able to stretch them-but would like a call

## 2019-04-01 ENCOUNTER — OFFICE VISIT (OUTPATIENT)
Dept: FAMILY MEDICINE | Facility: OTHER | Age: 59
End: 2019-04-01
Attending: FAMILY MEDICINE
Payer: COMMERCIAL

## 2019-04-01 VITALS
TEMPERATURE: 98.6 F | RESPIRATION RATE: 16 BRPM | HEART RATE: 76 BPM | BODY MASS INDEX: 43.28 KG/M2 | SYSTOLIC BLOOD PRESSURE: 132 MMHG | WEIGHT: 301.6 LBS | DIASTOLIC BLOOD PRESSURE: 70 MMHG

## 2019-04-01 DIAGNOSIS — G60.9 CHRONIC IDIOPATHIC AXONAL POLYNEUROPATHY: Primary | ICD-10-CM

## 2019-04-01 PROCEDURE — 80307 DRUG TEST PRSMV CHEM ANLYZR: CPT | Performed by: FAMILY MEDICINE

## 2019-04-01 PROCEDURE — 99213 OFFICE O/P EST LOW 20 MIN: CPT | Performed by: FAMILY MEDICINE

## 2019-04-01 RX ORDER — OXYCODONE AND ACETAMINOPHEN 5; 325 MG/1; MG/1
1-2 TABLET ORAL EVERY 6 HOURS PRN
Qty: 180 TABLET | Refills: 0 | Status: SHIPPED | OUTPATIENT
Start: 2019-04-01 | End: 2019-04-01

## 2019-04-01 RX ORDER — OXYCODONE AND ACETAMINOPHEN 5; 325 MG/1; MG/1
1-2 TABLET ORAL EVERY 6 HOURS PRN
Qty: 180 TABLET | Refills: 0 | Status: SHIPPED | OUTPATIENT
Start: 2019-04-01 | End: 2019-06-27

## 2019-04-01 ASSESSMENT — ASTHMA QUESTIONNAIRES
QUESTION_3 LAST FOUR WEEKS HOW OFTEN DID YOUR ASTHMA SYMPTOMS (WHEEZING, COUGHING, SHORTNESS OF BREATH, CHEST TIGHTNESS OR PAIN) WAKE YOU UP AT NIGHT OR EARLIER THAN USUAL IN THE MORNING: NOT AT ALL
QUESTION_2 LAST FOUR WEEKS HOW OFTEN HAVE YOU HAD SHORTNESS OF BREATH: NOT AT ALL
QUESTION_5 LAST FOUR WEEKS HOW WOULD YOU RATE YOUR ASTHMA CONTROL: COMPLETELY CONTROLLED
ACUTE_EXACERBATION_TODAY: NO
QUESTION_1 LAST FOUR WEEKS HOW MUCH OF THE TIME DID YOUR ASTHMA KEEP YOU FROM GETTING AS MUCH DONE AT WORK, SCHOOL OR AT HOME: NONE OF THE TIME
ACT_TOTALSCORE: 25
QUESTION_4 LAST FOUR WEEKS HOW OFTEN HAVE YOU USED YOUR RESCUE INHALER OR NEBULIZER MEDICATION (SUCH AS ALBUTEROL): NOT AT ALL

## 2019-04-01 ASSESSMENT — ENCOUNTER SYMPTOMS
DYSPHORIC MOOD: 1
FATIGUE: 0
BACK PAIN: 0

## 2019-04-01 ASSESSMENT — PAIN SCALES - GENERAL: PAINLEVEL: SEVERE PAIN (7)

## 2019-04-01 NOTE — NURSING NOTE
"Coming in for a medication check up    Chief Complaint   Patient presents with     Recheck Medication     check up       Initial /70 (BP Location: Right arm, Patient Position: Sitting, Cuff Size: Adult Large)   Pulse 76   Temp 98.6  F (37  C) (Tympanic)   Resp 16   Wt 136.8 kg (301 lb 9.6 oz)   BMI 43.28 kg/m   Estimated body mass index is 43.28 kg/m  as calculated from the following:    Height as of 1/4/19: 1.778 m (5' 10\").    Weight as of this encounter: 136.8 kg (301 lb 9.6 oz).  Medication Reconciliation: complete    Jayashree Aguilar LPN  "

## 2019-04-01 NOTE — PROGRESS NOTES
"  SUBJECTIVE:   Regulo Roth is a 58 year old male who presents to clinic today for the following health issues:    HPI  Follow up on chronic pain.  Feet remain severe, at 7/10.  Has tried many different meds through the years, including medical THC.  Got a spinal stimulator, which has helped with about 90% of the \"stingers\"- shooting pains.  Burning remains constant.  Cannot get more than 4 hours of sleep at a time.  Gets slight relief from the oxycodone.  Last tox screen was as expected 10/18.  He has been swimming 3-4 times a week at the [employer].    Patient Active Problem List    Diagnosis Date Noted     Epidural lipomatosis 08/04/2017     Priority: Medium     Chronic idiopathic axonal polyneuropathy 06/21/2017     Priority: Medium     Neuropathy 06/17/2016     Priority: Medium     Sleep apnea, obstructive 06/30/2015     Priority: Medium     Pain medication agreement 09/04/2014     Priority: Medium     Overview:   Updated 6/3/2016       GERD (gastroesophageal reflux disease) 11/25/2013     Priority: Medium     Health care maintenance 11/25/2013     Priority: Medium     Vitamin D deficiency 11/15/2013     Priority: Medium     Paresthesia of bilateral legs 11/11/2013     Priority: Medium     Rhinitis medicamentosa 03/08/2013     Priority: Medium     Asthma 02/22/2012     Priority: Medium     Eye irritation 10/06/2011     Priority: Medium     Osteoarthritis, knee 06/22/2011     Priority: Medium     Plantar fascial fibromatosis 06/22/2011     Priority: Medium     Seborrheic dermatitis 06/22/2011     Priority: Medium     Past Surgical History:   Procedure Laterality Date     ARTHROPLASTY KNEE      August 2012,left - became infected require debridment     ARTHROSCOPY KNEE      1981,acl and medial meniscus repair [Other]     COLONOSCOPY  12/09/2013 12/9/13,F/U 2023     ESOPHAGOSCOPY, GASTROSCOPY, DUODENOSCOPY (EGD), COMBINED      12/9/13,EGD     FUSION CERVICAL ANTERIOR ONE LEVEL      c6-7     INSERT STIMULATOR AND " LEADS INTERNAL DORSAL COLUMN  04/2018     OTHER SURGICAL HISTORY      ,HERNIA REPAIR     OTHER SURGICAL HISTORY      ,HERNIA REPAIR     OTHER SURGICAL HISTORY      2012,205436,DEBRIDEMENT,Left,knee  infection following surgery     OTHER SURGICAL HISTORY      2012,27599.3,MI KNEE MANIPULATION     OTHER SURGICAL HISTORY      206904,MULTIPLE SLEEP LATENCY TEST WITHOUT CPAP,uses C PAP     TONSILLECTOMY, ADENOIDECTOMY, COMBINED      No Comments Provided     Social History     Tobacco Use     Smoking status: Former Smoker     Packs/day: 1.00     Years: 18.00     Pack years: 18.00     Smokeless tobacco: Never Used   Substance Use Topics     Alcohol use: Yes     Alcohol/week: 5.0 oz     Comment: Alcoholic Drinks/day: occ     Current Outpatient Medications   Medication Sig Dispense Refill     albuterol (PROAIR HFA/PROVENTIL HFA/VENTOLIN HFA) 108 (90 BASE) MCG/ACT Inhaler Inhale 2 puffs into the lungs every 4 hours as needed       Alpha Lipoic Acid 200 MG CAPS Take 200 mg by mouth 3 times daily 90 capsule 3     aspirin 81 MG tablet Take 81 mg by mouth daily       cholecalciferol (VITAMIN D-1000 MAX ST) 1000 UNITS TABS Take 1,000 Units by mouth daily       esomeprazole (NEXIUM) 40 MG DR capsule TAKE 1 CAPSULE BY MOUTH ONCE DAILY BEFORE A MEAL. 90 capsule 3     Esomeprazole Magnesium (NEXIUM PO) Take 40 mg by mouth every morning (before breakfast)       fluocinonide (LIDEX) 0.05 % solution        fluticasone (FLONASE) 50 MCG/ACT spray Spray 2 sprays into both nostrils daily       mometasone (ELOCON) 0.1 % ointment        order for DME CPAP, heated humidifier, mask, headgear, filters and tubing. For home use. Pressure:  8   Length of Need:       oxyCODONE-acetaminophen (PERCOCET) 5-325 MG tablet Take 1-2 tablets by mouth every 6 hours as needed for pain Max acetaminophen dose: 4000 mg in 24 hours.  Fill on/after 3/03/19 180 tablet 0     VITAMIN E COMPLEX PO Take by mouth daily       No Known Allergies    Review of  Systems   Constitutional: Negative for fatigue.   Musculoskeletal: Negative for back pain and gait problem.   Psychiatric/Behavioral: Positive for dysphoric mood.        OBJECTIVE:     /70 (BP Location: Right arm, Patient Position: Sitting, Cuff Size: Adult Large)   Pulse 76   Temp 98.6  F (37  C) (Tympanic)   Resp 16   Wt 136.8 kg (301 lb 9.6 oz)   BMI 43.28 kg/m    Body mass index is 43.28 kg/m .  Physical Exam   Constitutional: He is oriented to person, place, and time. He appears well-developed. No distress.   Musculoskeletal: Normal range of motion. He exhibits no edema.   Neurological: He is alert and oriented to person, place, and time.   Skin: He is not diaphoretic.           ASSESSMENT/PLAN:         (G60.9) Chronic idiopathic axonal polyneuropathy  (primary encounter diagnosis)  Comment: stable  Plan: oxyCODONE-acetaminophen (PERCOCET) 5-325 MG         tablet, Drug  Screen Comprehensive , Urine with        Reported Meds (MedTox) (Pain Care Package),         DISCONTINUED: oxyCODONE-acetaminophen         (PERCOCET) 5-325 MG tablet, DISCONTINUED:         oxyCODONE-acetaminophen (PERCOCET) 5-325 MG         tablet, DISCONTINUED: oxyCODONE-acetaminophen         (PERCOCET) 5-325 MG tablet         reviewed and unchanged.  3 months of meds given.  No changes        Kaiden Chavez MD  [facility]

## 2019-04-02 ASSESSMENT — ASTHMA QUESTIONNAIRES: ACT_TOTALSCORE: 25

## 2019-04-05 LAB — PAIN DRUG SCR UR W RPTD MEDS: NORMAL

## 2019-06-27 ENCOUNTER — OFFICE VISIT (OUTPATIENT)
Dept: FAMILY MEDICINE | Facility: OTHER | Age: 59
End: 2019-06-27
Attending: FAMILY MEDICINE
Payer: COMMERCIAL

## 2019-06-27 VITALS
HEART RATE: 77 BPM | DIASTOLIC BLOOD PRESSURE: 72 MMHG | WEIGHT: 294.4 LBS | RESPIRATION RATE: 16 BRPM | SYSTOLIC BLOOD PRESSURE: 130 MMHG | OXYGEN SATURATION: 96 % | TEMPERATURE: 98.6 F | BODY MASS INDEX: 42.24 KG/M2

## 2019-06-27 DIAGNOSIS — G60.9 CHRONIC IDIOPATHIC AXONAL POLYNEUROPATHY: Primary | ICD-10-CM

## 2019-06-27 DIAGNOSIS — R33.8 BENIGN PROSTATIC HYPERPLASIA WITH URINARY RETENTION: ICD-10-CM

## 2019-06-27 DIAGNOSIS — N40.1 BENIGN PROSTATIC HYPERPLASIA WITH URINARY RETENTION: ICD-10-CM

## 2019-06-27 LAB — PSA SERPL-MCNC: 0.45 NG/ML

## 2019-06-27 PROCEDURE — 99214 OFFICE O/P EST MOD 30 MIN: CPT | Performed by: FAMILY MEDICINE

## 2019-06-27 PROCEDURE — 84153 ASSAY OF PSA TOTAL: CPT | Mod: ZL | Performed by: FAMILY MEDICINE

## 2019-06-27 PROCEDURE — 36415 COLL VENOUS BLD VENIPUNCTURE: CPT | Mod: ZL | Performed by: FAMILY MEDICINE

## 2019-06-27 RX ORDER — TAMSULOSIN HYDROCHLORIDE 0.4 MG/1
0.4 CAPSULE ORAL DAILY
Qty: 90 CAPSULE | Refills: 3 | Status: SHIPPED | OUTPATIENT
Start: 2019-06-27 | End: 2019-11-12

## 2019-06-27 RX ORDER — OXYCODONE HYDROCHLORIDE 10 MG/1
10 TABLET ORAL EVERY 6 HOURS PRN
Qty: 120 TABLET | Refills: 0 | Status: SHIPPED | OUTPATIENT
Start: 2019-06-27 | End: 2019-07-23

## 2019-06-27 ASSESSMENT — ENCOUNTER SYMPTOMS
PARESTHESIAS: 1
NUMBNESS: 1
FATIGUE: 0
SLEEP DISTURBANCE: 1

## 2019-06-27 ASSESSMENT — ANXIETY QUESTIONNAIRES
GAD7 TOTAL SCORE: 0
1. FEELING NERVOUS, ANXIOUS, OR ON EDGE: NOT AT ALL
6. BECOMING EASILY ANNOYED OR IRRITABLE: NOT AT ALL
5. BEING SO RESTLESS THAT IT IS HARD TO SIT STILL: NOT AT ALL
7. FEELING AFRAID AS IF SOMETHING AWFUL MIGHT HAPPEN: NOT AT ALL
IF YOU CHECKED OFF ANY PROBLEMS ON THIS QUESTIONNAIRE, HOW DIFFICULT HAVE THESE PROBLEMS MADE IT FOR YOU TO DO YOUR WORK, TAKE CARE OF THINGS AT HOME, OR GET ALONG WITH OTHER PEOPLE: NOT DIFFICULT AT ALL
3. WORRYING TOO MUCH ABOUT DIFFERENT THINGS: NOT AT ALL
2. NOT BEING ABLE TO STOP OR CONTROL WORRYING: NOT AT ALL

## 2019-06-27 ASSESSMENT — PAIN SCALES - GENERAL: PAINLEVEL: WORST PAIN (10)

## 2019-06-27 ASSESSMENT — PATIENT HEALTH QUESTIONNAIRE - PHQ9: 5. POOR APPETITE OR OVEREATING: NOT AT ALL

## 2019-06-27 NOTE — PROGRESS NOTES
SUBJECTIVE:   Regulo Roth is a 58 year old male who presents to clinic today for the following health issues:    HPI  Follow up on neuropathy.  This has gotten worse, rates it at 10/10.  Has failed cymbalta, neurontin, lyrica, Elavil.  Does not want to go to [place].  Has had neuro consults in [place] as well as the [facility].  Did a research protocol at the , with 20 treatment involving a laser, without any change at all.  Sleeps only about 3-4 hours at a time.  Will wake up to take a pill.  Using just one 5/325 percocet at a time.  180 per month total.  Pain is on both soles, to heels and on top of fore foot area bilateral.  Constant but worse at night.  Feels like he is walking on ice all the time.  Does not let sheets touch the feet.  Is on medical THC, uses this at night.  Helps a little.  Last tox screen was 4/1/19 and as expected.     Having significant urinary hesitancy.  Will take several minutes to start voiding now.  Nocturia 2 typically.  Wants to start Flomax.  Very rarely drinks EtOH now.    Patient Active Problem List    Diagnosis Date Noted     Epidural lipomatosis 08/04/2017     Priority: Medium     Chronic idiopathic axonal polyneuropathy 06/21/2017     Priority: Medium     Neuropathy 06/17/2016     Priority: Medium     Sleep apnea, obstructive 06/30/2015     Priority: Medium     Controlled substance agreement signed 8/4/17 09/04/2014     Priority: Medium     Overview:   Updated 6/3/2016       GERD (gastroesophageal reflux disease) 11/25/2013     Priority: Medium     Health care maintenance 11/25/2013     Priority: Medium     Vitamin D deficiency 11/15/2013     Priority: Medium     Paresthesia of bilateral legs 11/11/2013     Priority: Medium     Rhinitis medicamentosa 03/08/2013     Priority: Medium     Asthma 02/22/2012     Priority: Medium     Eye irritation 10/06/2011     Priority: Medium     Osteoarthritis, knee 06/22/2011     Priority: Medium     Plantar fascial fibromatosis 06/22/2011      Priority: Medium     Seborrheic dermatitis 06/22/2011     Priority: Medium     Past Surgical History:   Procedure Laterality Date     ARTHROPLASTY KNEE      August 2012,left - became infected require debridment     ARTHROSCOPY KNEE      1981,acl and medial meniscus repair [Other]     COLONOSCOPY  12/09/2013    ,F/U 2023     ESOPHAGOSCOPY, GASTROSCOPY, DUODENOSCOPY (EGD), COMBINED      12/9/13,EGD     FUSION CERVICAL ANTERIOR ONE LEVEL      c6-7     INSERT STIMULATOR AND LEADS INTERNAL DORSAL COLUMN  04/2018     OTHER SURGICAL HISTORY      ,HERNIA REPAIR     OTHER SURGICAL HISTORY      ,HERNIA REPAIR     OTHER SURGICAL HISTORY      2012,[id number],DEBRIDEMENT,Left,knee  infection following surgery     OTHER SURGICAL HISTORY      2012,73548.3,[place] KNEE MANIPULATION     OTHER SURGICAL HISTORY      206904,MULTIPLE SLEEP LATENCY TEST WITHOUT CPAP,uses C PAP     TONSILLECTOMY, ADENOIDECTOMY, COMBINED      No Comments Provided     Social History     Tobacco Use     Smoking status: Former Smoker     Packs/day: 1.00     Years: 18.00     Pack years: 18.00     Smokeless tobacco: Never Used   Substance Use Topics     Alcohol use: Yes     Alcohol/week: 5.0 oz     Comment: Alcoholic Drinks/day: occ     Current Outpatient Medications   Medication Sig Dispense Refill     albuterol (PROAIR HFA/PROVENTIL HFA/VENTOLIN HFA) 108 (90 BASE) MCG/ACT Inhaler Inhale 2 puffs into the lungs every 4 hours as needed       Alpha Lipoic Acid 200 MG CAPS Take 200 mg by mouth 3 times daily 90 capsule 3     aspirin 81 MG tablet Take 81 mg by mouth daily       cholecalciferol (VITAMIN D-1000 MAX ST) 1000 UNITS TABS Take 1,000 Units by mouth daily       esomeprazole (NEXIUM) 40 MG DR capsule TAKE 1 CAPSULE BY MOUTH ONCE DAILY BEFORE A MEAL. 90 capsule 3     Esomeprazole Magnesium (NEXIUM PO) Take 40 mg by mouth every morning (before breakfast)       fluocinonide (LIDEX) 0.05 % solution        fluticasone (FLONASE) 50 MCG/ACT spray Spray 2 sprays  into both nostrils daily       mometasone (ELOCON) 0.1 % ointment        order for DME CPAP, heated humidifier, mask, headgear, filters and tubing. For home use. Pressure:  8   Length of Need:       oxyCODONE IR (ROXICODONE) 10 MG tablet Take 1 tablet (10 mg) by mouth every 6 hours as needed for severe pain 120 tablet 0     oxyCODONE-acetaminophen (PERCOCET) 5-325 MG tablet Take 1-2 tablets by mouth every 6 hours as needed for pain Max acetaminophen dose: 4000 mg in 24 hours.  Fill on/after 5/31/19 180 tablet 0     VITAMIN E COMPLEX PO Take by mouth daily       No Known Allergies    Review of Systems   Constitutional: Negative for fatigue.   Neurological: Positive for numbness and paresthesias.   Psychiatric/Behavioral: Positive for sleep disturbance.        OBJECTIVE:     /72 (BP Location: Right arm, Patient Position: Sitting, Cuff Size: Adult Large)   Pulse 77   Temp 98.6  F (37  C) (Tympanic)   Resp 16   Wt 133.5 kg (294 lb 6.4 oz)   SpO2 96%   BMI 42.24 kg/m    Body mass index is 42.24 kg/m .  Physical Exam   Constitutional: He is oriented to person, place, and time. He appears well-developed and well-nourished. No distress.   Musculoskeletal:   No pain in lumbar spine on palpation and negative straight leg raise.     Neurological: He is alert and oriented to person, place, and time.   Skin: Skin is warm and dry. He is not diaphoretic.           ASSESSMENT/PLAN:         (G60.9) Chronic idiopathic axonal polyneuropathy  (primary encounter diagnosis)  Comment: this has been very challenging to treat.  He has a nerve type problem and narcotics are not very effective.  He was on 60 milligram of morphine equlavents total so will increase to 90 total per my calculations.  He knows about the new laws, will need to see me in 4 weeks for next fill.  New contract signed.    Plan: oxyCODONE IR (ROXICODONE) 10 MG tablet               (N40.1,  R33.8) Benign prostatic hyperplasia with urinary retention  Comment:  it is possible the symptoms are neurologic.  He understands the need for more work up if meds do not help after 6-8 weeks or so.  Plan: Prostate Specific Antigen, tamsulosin (FLOMAX)         0.4 MG capsule            25 minutes with him, over half in coordination of care.            Kaiden Chavez MD  [facility]

## 2019-06-27 NOTE — NURSING NOTE
"Coming in for a medication check up    Chief Complaint   Patient presents with     Recheck Medication     check up       Initial /72 (BP Location: Right arm, Patient Position: Sitting, Cuff Size: Adult Large)   Pulse 77   Temp 98.6  F (37  C) (Tympanic)   Resp 16   Wt 133.5 kg (294 lb 6.4 oz)   SpO2 96%   BMI 42.24 kg/m   Estimated body mass index is 42.24 kg/m  as calculated from the following:    Height as of 1/4/19: 1.778 m (5' 10\").    Weight as of this encounter: 133.5 kg (294 lb 6.4 oz).  Medication Reconciliation: complete    Jayashree Aguilar LPN  "

## 2019-06-28 ASSESSMENT — ANXIETY QUESTIONNAIRES: GAD7 TOTAL SCORE: 0

## 2019-07-18 ENCOUNTER — TELEPHONE (OUTPATIENT)
Dept: FAMILY MEDICINE | Facility: OTHER | Age: 59
End: 2019-07-18

## 2019-07-18 NOTE — TELEPHONE ENCOUNTER
Called and got patient an appointment for next Tuesday the 23rd at 2pm.  Luis E Peraza LPN ..............7/18/2019 9:54 AM

## 2019-07-18 NOTE — TELEPHONE ENCOUNTER
Patient called in regards to getting in for a med refill early next week. Please call him back in regards to this.

## 2019-07-23 ENCOUNTER — OFFICE VISIT (OUTPATIENT)
Dept: FAMILY MEDICINE | Facility: OTHER | Age: 59
End: 2019-07-23
Attending: FAMILY MEDICINE
Payer: COMMERCIAL

## 2019-07-23 VITALS
TEMPERATURE: 98.1 F | WEIGHT: 295 LBS | BODY MASS INDEX: 42.33 KG/M2 | HEART RATE: 66 BPM | SYSTOLIC BLOOD PRESSURE: 132 MMHG | DIASTOLIC BLOOD PRESSURE: 72 MMHG | RESPIRATION RATE: 16 BRPM

## 2019-07-23 DIAGNOSIS — G60.9 CHRONIC IDIOPATHIC AXONAL POLYNEUROPATHY: Primary | ICD-10-CM

## 2019-07-23 PROCEDURE — 99213 OFFICE O/P EST LOW 20 MIN: CPT | Performed by: FAMILY MEDICINE

## 2019-07-23 RX ORDER — OXYCODONE HYDROCHLORIDE 10 MG/1
10 TABLET ORAL EVERY 6 HOURS PRN
Qty: 120 TABLET | Refills: 0 | Status: SHIPPED | OUTPATIENT
Start: 2019-07-23 | End: 2019-08-20

## 2019-07-23 ASSESSMENT — ANXIETY QUESTIONNAIRES
2. NOT BEING ABLE TO STOP OR CONTROL WORRYING: NOT AT ALL
IF YOU CHECKED OFF ANY PROBLEMS ON THIS QUESTIONNAIRE, HOW DIFFICULT HAVE THESE PROBLEMS MADE IT FOR YOU TO DO YOUR WORK, TAKE CARE OF THINGS AT HOME, OR GET ALONG WITH OTHER PEOPLE: NOT DIFFICULT AT ALL
1. FEELING NERVOUS, ANXIOUS, OR ON EDGE: NOT AT ALL
5. BEING SO RESTLESS THAT IT IS HARD TO SIT STILL: NOT AT ALL
GAD7 TOTAL SCORE: 0
6. BECOMING EASILY ANNOYED OR IRRITABLE: NOT AT ALL
7. FEELING AFRAID AS IF SOMETHING AWFUL MIGHT HAPPEN: NOT AT ALL
3. WORRYING TOO MUCH ABOUT DIFFERENT THINGS: NOT AT ALL

## 2019-07-23 ASSESSMENT — ENCOUNTER SYMPTOMS
PARESTHESIAS: 1
FATIGUE: 0
NUMBNESS: 1
SLEEP DISTURBANCE: 1

## 2019-07-23 ASSESSMENT — PAIN SCALES - GENERAL: PAINLEVEL: WORST PAIN (10)

## 2019-07-23 ASSESSMENT — PATIENT HEALTH QUESTIONNAIRE - PHQ9: 5. POOR APPETITE OR OVEREATING: NOT AT ALL

## 2019-07-23 NOTE — NURSING NOTE
"Coming in for a medication check    Chief Complaint   Patient presents with     Recheck Medication     check uip       Initial /72 (BP Location: Right arm, Patient Position: Sitting, Cuff Size: Adult Large)   Pulse 66   Temp 98.1  F (36.7  C) (Tympanic)   Resp 16   Wt 133.8 kg (295 lb)   BMI 42.33 kg/m   Estimated body mass index is 42.33 kg/m  as calculated from the following:    Height as of 1/4/19: 1.778 m (5' 10\").    Weight as of this encounter: 133.8 kg (295 lb).  Medication Reconciliation: complete    Jayashree Aguilar LPN  "

## 2019-07-23 NOTE — PROGRESS NOTES
SUBJECTIVE:   Regulo Roth is a 59 year old male who presents to clinic today for the following health issues:    HPI    Follow up on pain meds.  Today his pain is 10/10.  He shut off his implant and forgot to turn it back on when he left home.  Wondering about a topical anesthetic on his feet to get him to sleep.  Last tox screen was 4/1 and as expected.  Uses medical THC at night only.  Helps him sleep a bit better.  We increased the narcotics recently and is now sleeping about 1-2 hours longer at a spell than before.    Patient Active Problem List    Diagnosis Date Noted     Benign prostatic hyperplasia with urinary retention 06/27/2019     Priority: Medium     Epidural lipomatosis 08/04/2017     Priority: Medium     Chronic idiopathic axonal polyneuropathy 06/21/2017     Priority: Medium     Neuropathy 06/17/2016     Priority: Medium     Sleep apnea, obstructive 06/30/2015     Priority: Medium     Controlled substance agreement signed 8/4/17 09/04/2014     Priority: Medium     Overview:   Updated 6/3/2016       GERD (gastroesophageal reflux disease) 11/25/2013     Priority: Medium     Health care maintenance 11/25/2013     Priority: Medium     Vitamin D deficiency 11/15/2013     Priority: Medium     Paresthesia of bilateral legs 11/11/2013     Priority: Medium     Rhinitis medicamentosa 03/08/2013     Priority: Medium     Asthma 02/22/2012     Priority: Medium     Eye irritation 10/06/2011     Priority: Medium     Osteoarthritis, knee 06/22/2011     Priority: Medium     Plantar fascial fibromatosis 06/22/2011     Priority: Medium     Seborrheic dermatitis 06/22/2011     Priority: Medium     Past Surgical History:   Procedure Laterality Date     ARTHROPLASTY KNEE      August 2012,left - became infected require debridment     ARTHROSCOPY KNEE      1981,acl and medial meniscus repair [Other]     COLONOSCOPY  12/09/2013    ,F/U 2023     ESOPHAGOSCOPY, GASTROSCOPY, DUODENOSCOPY (EGD), COMBINED       12/9/13,EGD     FUSION CERVICAL ANTERIOR ONE LEVEL      c6-7     INSERT STIMULATOR AND LEADS INTERNAL DORSAL COLUMN  04/2018     OTHER SURGICAL HISTORY      ,HERNIA REPAIR     OTHER SURGICAL HISTORY      ,HERNIA REPAIR     OTHER SURGICAL HISTORY      2012,[id number],DEBRIDEMENT,Left,knee  infection following surgery     OTHER SURGICAL HISTORY      2012,27599.3,[place] KNEE MANIPULATION     OTHER SURGICAL HISTORY      206904,MULTIPLE SLEEP LATENCY TEST WITHOUT CPAP,uses C PAP     TONSILLECTOMY, ADENOIDECTOMY, COMBINED      No Comments Provided     Social History     Tobacco Use     Smoking status: Former Smoker     Packs/day: 1.00     Years: 18.00     Pack years: 18.00     Smokeless tobacco: Never Used   Substance Use Topics     Alcohol use: Yes     Alcohol/week: 5.0 oz     Comment: Alcoholic Drinks/day: occ     Current Outpatient Medications   Medication Sig Dispense Refill     albuterol (PROAIR HFA/PROVENTIL HFA/VENTOLIN HFA) 108 (90 BASE) MCG/ACT Inhaler Inhale 2 puffs into the lungs every 4 hours as needed       Alpha Lipoic Acid 200 MG CAPS Take 200 mg by mouth 3 times daily 90 capsule 3     aspirin 81 MG tablet Take 81 mg by mouth daily       cholecalciferol (VITAMIN D-1000 MAX ST) 1000 UNITS TABS Take 1,000 Units by mouth daily       esomeprazole (NEXIUM) 40 MG DR capsule TAKE 1 CAPSULE BY MOUTH ONCE DAILY BEFORE A MEAL. 90 capsule 3     fluocinonide (LIDEX) 0.05 % solution        fluticasone (FLONASE) 50 MCG/ACT spray Spray 2 sprays into both nostrils daily       mometasone (ELOCON) 0.1 % ointment        order for DME CPAP, heated humidifier, mask, headgear, filters and tubing. For home use. Pressure:  8   Length of Need:       oxyCODONE IR (ROXICODONE) 10 MG tablet Take 1 tablet (10 mg) by mouth every 6 hours as needed for severe pain 120 tablet 0     tamsulosin (FLOMAX) 0.4 MG capsule Take 1 capsule (0.4 mg) by mouth daily 90 capsule 3     VITAMIN E COMPLEX PO Take by mouth daily       No Known  Allergies    Review of Systems   Constitutional: Negative for fatigue.   Neurological: Positive for numbness and paresthesias.   Psychiatric/Behavioral: Positive for sleep disturbance.        OBJECTIVE:     /72 (BP Location: Right arm, Patient Position: Sitting, Cuff Size: Adult Large)   Pulse 66   Temp 98.1  F (36.7  C) (Tympanic)   Resp 16   Wt 133.8 kg (295 lb)   BMI 42.33 kg/m    Body mass index is 42.33 kg/m .  Physical Exam   Constitutional: He is oriented to person, place, and time. He appears well-developed. No distress.   Musculoskeletal:   Knees without erythema and no effusions   Neurological: He is alert and oriented to person, place, and time.   Skin: Skin is warm and dry. He is not diaphoretic.           ASSESSMENT/PLAN:         (G60.9) Chronic idiopathic axonal polyneuropathy  (primary encounter diagnosis)  Comment: no real change  Plan: oxyCODONE IR (ROXICODONE) 10 MG tablet,         Lidocaine 0.5 % AERO        Refilled oxycodone at 120 tabs.  Follow up in 1 month.     reviewed and stable    Kaiden Chavez MD  [facility]

## 2019-07-24 ASSESSMENT — ANXIETY QUESTIONNAIRES: GAD7 TOTAL SCORE: 0

## 2019-08-20 ENCOUNTER — OFFICE VISIT (OUTPATIENT)
Dept: FAMILY MEDICINE | Facility: OTHER | Age: 59
End: 2019-08-20
Attending: FAMILY MEDICINE
Payer: COMMERCIAL

## 2019-08-20 VITALS
DIASTOLIC BLOOD PRESSURE: 80 MMHG | WEIGHT: 295 LBS | SYSTOLIC BLOOD PRESSURE: 140 MMHG | BODY MASS INDEX: 42.33 KG/M2 | RESPIRATION RATE: 16 BRPM | TEMPERATURE: 98.6 F | HEART RATE: 95 BPM

## 2019-08-20 DIAGNOSIS — G60.9 CHRONIC IDIOPATHIC AXONAL POLYNEUROPATHY: ICD-10-CM

## 2019-08-20 PROCEDURE — 99213 OFFICE O/P EST LOW 20 MIN: CPT | Performed by: FAMILY MEDICINE

## 2019-08-20 RX ORDER — OXYCODONE HYDROCHLORIDE 10 MG/1
10 TABLET ORAL EVERY 6 HOURS PRN
Qty: 120 TABLET | Refills: 0 | Status: SHIPPED | OUTPATIENT
Start: 2019-08-20 | End: 2019-09-16

## 2019-08-20 ASSESSMENT — ANXIETY QUESTIONNAIRES
3. WORRYING TOO MUCH ABOUT DIFFERENT THINGS: NOT AT ALL
7. FEELING AFRAID AS IF SOMETHING AWFUL MIGHT HAPPEN: NOT AT ALL
2. NOT BEING ABLE TO STOP OR CONTROL WORRYING: NOT AT ALL
1. FEELING NERVOUS, ANXIOUS, OR ON EDGE: NOT AT ALL
5. BEING SO RESTLESS THAT IT IS HARD TO SIT STILL: NOT AT ALL
GAD7 TOTAL SCORE: 0
IF YOU CHECKED OFF ANY PROBLEMS ON THIS QUESTIONNAIRE, HOW DIFFICULT HAVE THESE PROBLEMS MADE IT FOR YOU TO DO YOUR WORK, TAKE CARE OF THINGS AT HOME, OR GET ALONG WITH OTHER PEOPLE: NOT DIFFICULT AT ALL
6. BECOMING EASILY ANNOYED OR IRRITABLE: NOT AT ALL

## 2019-08-20 ASSESSMENT — PAIN SCALES - GENERAL: PAINLEVEL: WORST PAIN (10)

## 2019-08-20 ASSESSMENT — ENCOUNTER SYMPTOMS
SLEEP DISTURBANCE: 1
PARESTHESIAS: 1
FATIGUE: 0

## 2019-08-20 ASSESSMENT — PATIENT HEALTH QUESTIONNAIRE - PHQ9: 5. POOR APPETITE OR OVEREATING: NOT AT ALL

## 2019-08-20 NOTE — NURSING NOTE
"coming in for a medication check up, would like a numbing spray    Chief Complaint   Patient presents with     Recheck Medication     check        Initial BP (!) 140/80 (BP Location: Right arm, Patient Position: Sitting, Cuff Size: Adult Large)   Pulse 95   Temp 98.6  F (37  C) (Tympanic)   Resp 16   Wt 133.8 kg (295 lb)   BMI 42.33 kg/m   Estimated body mass index is 42.33 kg/m  as calculated from the following:    Height as of 1/4/19: 1.778 m (5' 10\").    Weight as of this encounter: 133.8 kg (295 lb).  Medication Reconciliation: complete    Jayashree Aguilar LPN  "

## 2019-08-20 NOTE — PROGRESS NOTES
SUBJECTIVE:   Regulo Roth is a 59 year old male who presents to clinic today for the following health issues:    HPI  Follow up on foot pains.  Due for a refill on his narcotics.  Had a bad night, slept in a chair and wrapped them in wet towels.  Pain today is 10/10.  Is on medical THC, helps with sleep mostly.  Tox screen was as expected 4/1/19.  No drug abuse.  We had increased the dose recently and he feels it helped only for a short while, is now back to where he was before the increase.    Patient Active Problem List    Diagnosis Date Noted     Benign prostatic hyperplasia with urinary retention 06/27/2019     Priority: Medium     Epidural lipomatosis 08/04/2017     Priority: Medium     Chronic idiopathic axonal polyneuropathy 06/21/2017     Priority: Medium     Neuropathy 06/17/2016     Priority: Medium     Sleep apnea, obstructive 06/30/2015     Priority: Medium     Controlled substance agreement signed 6/27/19 09/04/2014     Priority: Medium     Overview:   Updated 6/3/2016       GERD (gastroesophageal reflux disease) 11/25/2013     Priority: Medium     Health care maintenance 11/25/2013     Priority: Medium     Vitamin D deficiency 11/15/2013     Priority: Medium     Paresthesia of bilateral legs 11/11/2013     Priority: Medium     Rhinitis medicamentosa 03/08/2013     Priority: Medium     Asthma 02/22/2012     Priority: Medium     Eye irritation 10/06/2011     Priority: Medium     Osteoarthritis, knee 06/22/2011     Priority: Medium     Plantar fascial fibromatosis 06/22/2011     Priority: Medium     Seborrheic dermatitis 06/22/2011     Priority: Medium     Past Surgical History:   Procedure Laterality Date     ARTHROPLASTY KNEE      August 2012,left - became infected require debridment     ARTHROSCOPY KNEE      1981,acl and medial meniscus repair [Other]     COLONOSCOPY  12/09/2013    ,F/U 2023     ESOPHAGOSCOPY, GASTROSCOPY, DUODENOSCOPY (EGD), COMBINED      12/9/13,EGD     FUSION CERVICAL  ANTERIOR ONE LEVEL      c6-7     INSERT STIMULATOR AND LEADS INTERNAL DORSAL COLUMN  04/2018     OTHER SURGICAL HISTORY      ,HERNIA REPAIR     OTHER SURGICAL HISTORY      ,HERNIA REPAIR     OTHER SURGICAL HISTORY      2012,[id number],DEBRIDEMENT,Left,knee  infection following surgery     OTHER SURGICAL HISTORY      2012,27599.3,[place] KNEE MANIPULATION     OTHER SURGICAL HISTORY      206904,MULTIPLE SLEEP LATENCY TEST WITHOUT CPAP,uses C PAP     TONSILLECTOMY, ADENOIDECTOMY, COMBINED      No Comments Provided     Social History     Tobacco Use     Smoking status: Former Smoker     Packs/day: 1.00     Years: 18.00     Pack years: 18.00     Smokeless tobacco: Never Used   Substance Use Topics     Alcohol use: Yes     Alcohol/week: 5.0 oz     Comment: Alcoholic Drinks/day: occ     Current Outpatient Medications   Medication Sig Dispense Refill     albuterol (PROAIR HFA/PROVENTIL HFA/VENTOLIN HFA) 108 (90 BASE) MCG/ACT Inhaler Inhale 2 puffs into the lungs every 4 hours as needed       Alpha Lipoic Acid 200 MG CAPS Take 200 mg by mouth 3 times daily 90 capsule 3     aspirin 81 MG tablet Take 81 mg by mouth daily       cholecalciferol (VITAMIN D-1000 MAX ST) 1000 UNITS TABS Take 1,000 Units by mouth daily       esomeprazole (NEXIUM) 40 MG DR capsule TAKE 1 CAPSULE BY MOUTH ONCE DAILY BEFORE A MEAL. 90 capsule 3     fluocinonide (LIDEX) 0.05 % solution        fluticasone (FLONASE) 50 MCG/ACT spray Spray 2 sprays into both nostrils daily       Lidocaine 0.5 % AERO Externally apply 1 spray topically 4 times daily as needed (neuropathy) 170 g 11     mometasone (ELOCON) 0.1 % ointment        order for DME CPAP, heated humidifier, mask, headgear, filters and tubing. For home use. Pressure:  8   Length of Need:       oxyCODONE IR (ROXICODONE) 10 MG tablet Take 1 tablet (10 mg) by mouth every 6 hours as needed for severe pain 120 tablet 0     tamsulosin (FLOMAX) 0.4 MG capsule Take 1 capsule (0.4 mg) by mouth daily 90 capsule  3     VITAMIN E COMPLEX PO Take by mouth daily       No Known Allergies    Review of Systems   Constitutional: Negative for fatigue.   Neurological: Positive for paresthesias.   Psychiatric/Behavioral: Positive for sleep disturbance.        OBJECTIVE:     BP (!) 140/80 (BP Location: Right arm, Patient Position: Sitting, Cuff Size: Adult Large)   Pulse 95   Temp 98.6  F (37  C) (Tympanic)   Resp 16   Wt 133.8 kg (295 lb)   BMI 42.33 kg/m    Body mass index is 42.33 kg/m .  Physical Exam   Constitutional: He is oriented to person, place, and time. He appears well-developed. No distress.   Neurological: He is alert and oriented to person, place, and time.   Skin: Skin is warm and dry. He is not diaphoretic.   Psychiatric: He has a normal mood and affect. His behavior is normal. Thought content normal.       Diagnostic Test Results:  none     ASSESSMENT/PLAN:         (G60.9) Chronic idiopathic axonal polyneuropathy  Comment: chronic and incurable with a significant work up, including several neurology consults.  Plan: oxyCODONE IR (ROXICODONE) 10 MG tablet        Refilled this for 30 days, #120.          Kaiden Chavez MD  [facility]

## 2019-08-21 ASSESSMENT — ANXIETY QUESTIONNAIRES: GAD7 TOTAL SCORE: 0

## 2019-09-16 ENCOUNTER — OFFICE VISIT (OUTPATIENT)
Dept: FAMILY MEDICINE | Facility: OTHER | Age: 59
End: 2019-09-16
Attending: FAMILY MEDICINE
Payer: COMMERCIAL

## 2019-09-16 VITALS
SYSTOLIC BLOOD PRESSURE: 132 MMHG | TEMPERATURE: 98.1 F | HEART RATE: 66 BPM | DIASTOLIC BLOOD PRESSURE: 64 MMHG | BODY MASS INDEX: 41.75 KG/M2 | RESPIRATION RATE: 16 BRPM | WEIGHT: 291 LBS | OXYGEN SATURATION: 92 %

## 2019-09-16 DIAGNOSIS — G60.9 CHRONIC IDIOPATHIC AXONAL POLYNEUROPATHY: Primary | ICD-10-CM

## 2019-09-16 PROCEDURE — 80307 DRUG TEST PRSMV CHEM ANLYZR: CPT | Mod: ZL | Performed by: FAMILY MEDICINE

## 2019-09-16 PROCEDURE — 99213 OFFICE O/P EST LOW 20 MIN: CPT | Performed by: FAMILY MEDICINE

## 2019-09-16 RX ORDER — OXYCODONE HYDROCHLORIDE 10 MG/1
10 TABLET ORAL EVERY 6 HOURS PRN
Qty: 120 TABLET | Refills: 0 | Status: SHIPPED | OUTPATIENT
Start: 2019-09-16 | End: 2019-10-14

## 2019-09-16 ASSESSMENT — ANXIETY QUESTIONNAIRES
1. FEELING NERVOUS, ANXIOUS, OR ON EDGE: NOT AT ALL
6. BECOMING EASILY ANNOYED OR IRRITABLE: NOT AT ALL
7. FEELING AFRAID AS IF SOMETHING AWFUL MIGHT HAPPEN: NOT AT ALL
GAD7 TOTAL SCORE: 0
3. WORRYING TOO MUCH ABOUT DIFFERENT THINGS: NOT AT ALL
2. NOT BEING ABLE TO STOP OR CONTROL WORRYING: NOT AT ALL
5. BEING SO RESTLESS THAT IT IS HARD TO SIT STILL: NOT AT ALL
IF YOU CHECKED OFF ANY PROBLEMS ON THIS QUESTIONNAIRE, HOW DIFFICULT HAVE THESE PROBLEMS MADE IT FOR YOU TO DO YOUR WORK, TAKE CARE OF THINGS AT HOME, OR GET ALONG WITH OTHER PEOPLE: NOT DIFFICULT AT ALL

## 2019-09-16 ASSESSMENT — ENCOUNTER SYMPTOMS
FATIGUE: 0
SLEEP DISTURBANCE: 1
PARESTHESIAS: 1

## 2019-09-16 ASSESSMENT — PAIN SCALES - GENERAL: PAINLEVEL: WORST PAIN (10)

## 2019-09-16 ASSESSMENT — PATIENT HEALTH QUESTIONNAIRE - PHQ9: 5. POOR APPETITE OR OVEREATING: NOT AT ALL

## 2019-09-16 NOTE — NURSING NOTE
"Coming in for a medication check up    Chief Complaint   Patient presents with     Recheck Medication     check up       Initial /64 (BP Location: Right arm, Patient Position: Sitting, Cuff Size: Adult Large)   Pulse 66   Temp 98.1  F (36.7  C) (Tympanic)   Resp 16   Wt 132 kg (291 lb)   SpO2 92%   BMI 41.75 kg/m   Estimated body mass index is 41.75 kg/m  as calculated from the following:    Height as of 1/4/19: 1.778 m (5' 10\").    Weight as of this encounter: 132 kg (291 lb).  Medication Reconciliation: complete    Jayashree Aguilar LPN  "

## 2019-09-16 NOTE — PROGRESS NOTES
SUBJECTIVE:   Regulo Roth is a 59 year old male who presents to clinic today for the following health issues:    HPI  Follow up on neuropathy.  Pain is about the same to perhaps worse.  Up to 10/10.  Poor sleeping, in a chair less than before however.  Cannot really have anything touching his feet.  Can tell when the meds are wearing off, gets about 4 hours of relief.  Last tox screen was as expected in April.    Patient Active Problem List    Diagnosis Date Noted     Benign prostatic hyperplasia with urinary retention 06/27/2019     Priority: Medium     Epidural lipomatosis 08/04/2017     Priority: Medium     Chronic idiopathic axonal polyneuropathy 06/21/2017     Priority: Medium     Neuropathy 06/17/2016     Priority: Medium     Sleep apnea, obstructive 06/30/2015     Priority: Medium     Controlled substance agreement signed 6/27/19 09/04/2014     Priority: Medium     Overview:   Updated 6/3/2016       GERD (gastroesophageal reflux disease) 11/25/2013     Priority: Medium     Health care maintenance 11/25/2013     Priority: Medium     Vitamin D deficiency 11/15/2013     Priority: Medium     Paresthesia of bilateral legs 11/11/2013     Priority: Medium     Rhinitis medicamentosa 03/08/2013     Priority: Medium     Asthma 02/22/2012     Priority: Medium     Eye irritation 10/06/2011     Priority: Medium     Osteoarthritis, knee 06/22/2011     Priority: Medium     Plantar fascial fibromatosis 06/22/2011     Priority: Medium     Seborrheic dermatitis 06/22/2011     Priority: Medium     Past Surgical History:   Procedure Laterality Date     ARTHROPLASTY KNEE      August 2012,left - became infected require debridment     ARTHROSCOPY KNEE      1981,acl and medial meniscus repair [Other]     COLONOSCOPY  12/09/2013    ,F/U 2023     ESOPHAGOSCOPY, GASTROSCOPY, DUODENOSCOPY (EGD), COMBINED      12/9/13,EGD     FUSION CERVICAL ANTERIOR ONE LEVEL      c6-7     INSERT STIMULATOR AND LEADS INTERNAL DORSAL COLUMN   04/2018     OTHER SURGICAL HISTORY      ,HERNIA REPAIR     OTHER SURGICAL HISTORY      ,HERNIA REPAIR     OTHER SURGICAL HISTORY      2012,[id number],DEBRIDEMENT,Left,knee  infection following surgery     OTHER SURGICAL HISTORY      2012,27599.3,[place] KNEE MANIPULATION     OTHER SURGICAL HISTORY      206904,MULTIPLE SLEEP LATENCY TEST WITHOUT CPAP,uses C PAP     TONSILLECTOMY, ADENOIDECTOMY, COMBINED      No Comments Provided     Social History     Tobacco Use     Smoking status: Former Smoker     Packs/day: 1.00     Years: 18.00     Pack years: 18.00     Smokeless tobacco: Never Used   Substance Use Topics     Alcohol use: Yes     Alcohol/week: 5.0 oz     Comment: Alcoholic Drinks/day: occ     Current Outpatient Medications   Medication Sig Dispense Refill     albuterol (PROAIR HFA/PROVENTIL HFA/VENTOLIN HFA) 108 (90 BASE) MCG/ACT Inhaler Inhale 2 puffs into the lungs every 4 hours as needed       Alpha Lipoic Acid 200 MG CAPS Take 200 mg by mouth 3 times daily 90 capsule 3     aspirin 81 MG tablet Take 81 mg by mouth daily       cholecalciferol (VITAMIN D-1000 MAX ST) 1000 UNITS TABS Take 1,000 Units by mouth daily       esomeprazole (NEXIUM) 40 MG DR capsule TAKE 1 CAPSULE BY MOUTH ONCE DAILY BEFORE A MEAL. 90 capsule 3     fluocinonide (LIDEX) 0.05 % solution        fluticasone (FLONASE) 50 MCG/ACT spray Spray 2 sprays into both nostrils daily       Lidocaine 0.5 % AERO Externally apply 1 spray topically 4 times daily as needed (neuropathy) 170 g 11     mometasone (ELOCON) 0.1 % ointment        order for DME CPAP, heated humidifier, mask, headgear, filters and tubing. For home use. Pressure:  8   Length of Need:       oxyCODONE IR (ROXICODONE) 10 MG tablet Take 1 tablet (10 mg) by mouth every 6 hours as needed for severe pain 120 tablet 0     tamsulosin (FLOMAX) 0.4 MG capsule Take 1 capsule (0.4 mg) by mouth daily 90 capsule 3     VITAMIN E COMPLEX PO Take by mouth daily       No Known Allergies    Review of  Systems   Constitutional: Negative for fatigue.   Neurological: Positive for paresthesias.   Psychiatric/Behavioral: Positive for sleep disturbance.        OBJECTIVE:     /64 (BP Location: Right arm, Patient Position: Sitting, Cuff Size: Adult Large)   Pulse 66   Temp 98.1  F (36.7  C) (Tympanic)   Resp 16   Wt 132 kg (291 lb)   SpO2 92%   BMI 41.75 kg/m    Body mass index is 41.75 kg/m .  Physical Exam   Constitutional: He is oriented to person, place, and time. He appears well-developed and well-nourished. No distress.   Neurological: He is alert and oriented to person, place, and time.   Skin: He is not diaphoretic.   Psychiatric: He has a normal mood and affect. His behavior is normal. Thought content normal.       Diagnostic Test Results:  none     ASSESSMENT/PLAN:         (G60.9) Chronic idiopathic axonal polyneuropathy  (primary encounter diagnosis)  Comment:  reviewed and stable  Plan: oxyCODONE IR (ROXICODONE) 10 MG tablet, Drug          Screen Comprehensive , Urine with Reported Meds        ([facility]) (Pain Care Package)        Follow up in 4 weeks. meds refilled, #120.        Kaiden Chavez MD  [facility] AND [facility]

## 2019-09-17 ASSESSMENT — ANXIETY QUESTIONNAIRES: GAD7 TOTAL SCORE: 0

## 2019-09-20 LAB — PAIN DRUG SCR UR W RPTD MEDS: NORMAL

## 2019-10-03 ENCOUNTER — HEALTH MAINTENANCE LETTER (OUTPATIENT)
Age: 59
End: 2019-10-03

## 2019-10-14 ENCOUNTER — OFFICE VISIT (OUTPATIENT)
Dept: FAMILY MEDICINE | Facility: OTHER | Age: 59
End: 2019-10-14
Attending: FAMILY MEDICINE
Payer: COMMERCIAL

## 2019-10-14 VITALS
WEIGHT: 291 LBS | DIASTOLIC BLOOD PRESSURE: 66 MMHG | RESPIRATION RATE: 16 BRPM | TEMPERATURE: 98.6 F | BODY MASS INDEX: 41.75 KG/M2 | HEART RATE: 66 BPM | SYSTOLIC BLOOD PRESSURE: 130 MMHG

## 2019-10-14 DIAGNOSIS — G60.9 CHRONIC IDIOPATHIC AXONAL POLYNEUROPATHY: Primary | ICD-10-CM

## 2019-10-14 PROCEDURE — 90471 IMMUNIZATION ADMIN: CPT | Performed by: FAMILY MEDICINE

## 2019-10-14 PROCEDURE — 90686 IIV4 VACC NO PRSV 0.5 ML IM: CPT | Performed by: FAMILY MEDICINE

## 2019-10-14 PROCEDURE — 99213 OFFICE O/P EST LOW 20 MIN: CPT | Mod: 25 | Performed by: FAMILY MEDICINE

## 2019-10-14 RX ORDER — OXYCODONE HYDROCHLORIDE 10 MG/1
10 TABLET ORAL EVERY 6 HOURS PRN
Qty: 120 TABLET | Refills: 0 | Status: SHIPPED | OUTPATIENT
Start: 2019-10-14 | End: 2019-11-12

## 2019-10-14 ASSESSMENT — ENCOUNTER SYMPTOMS
CONSTITUTIONAL NEGATIVE: 1
NUMBNESS: 1
FATIGUE: 0
SHORTNESS OF BREATH: 0
ABDOMINAL PAIN: 0
FEVER: 0
BACK PAIN: 0

## 2019-10-14 ASSESSMENT — ASTHMA QUESTIONNAIRES
QUESTION_3 LAST FOUR WEEKS HOW OFTEN DID YOUR ASTHMA SYMPTOMS (WHEEZING, COUGHING, SHORTNESS OF BREATH, CHEST TIGHTNESS OR PAIN) WAKE YOU UP AT NIGHT OR EARLIER THAN USUAL IN THE MORNING: NOT AT ALL
QUESTION_1 LAST FOUR WEEKS HOW MUCH OF THE TIME DID YOUR ASTHMA KEEP YOU FROM GETTING AS MUCH DONE AT WORK, SCHOOL OR AT HOME: NONE OF THE TIME
QUESTION_5 LAST FOUR WEEKS HOW WOULD YOU RATE YOUR ASTHMA CONTROL: WELL CONTROLLED
ACT_TOTALSCORE: 24
QUESTION_4 LAST FOUR WEEKS HOW OFTEN HAVE YOU USED YOUR RESCUE INHALER OR NEBULIZER MEDICATION (SUCH AS ALBUTEROL): NOT AT ALL
QUESTION_2 LAST FOUR WEEKS HOW OFTEN HAVE YOU HAD SHORTNESS OF BREATH: NOT AT ALL
ACUTE_EXACERBATION_TODAY: NO

## 2019-10-14 ASSESSMENT — PAIN SCALES - GENERAL: PAINLEVEL: WORST PAIN (10)

## 2019-10-14 NOTE — NURSING NOTE
"Coming in for monthly medication check    Chief Complaint   Patient presents with     Recheck Medication     one month       Initial /66 (BP Location: Right arm, Patient Position: Sitting, Cuff Size: Adult Large)   Pulse 66   Temp 98.6  F (37  C) (Tympanic)   Resp 16   Wt 132 kg (291 lb)   BMI 41.75 kg/m   Estimated body mass index is 41.75 kg/m  as calculated from the following:    Height as of 1/4/19: 1.778 m (5' 10\").    Weight as of this encounter: 132 kg (291 lb).  Medication Reconciliation: complete    Jayashree Aguilar LPN  "

## 2019-10-14 NOTE — PROGRESS NOTES
SUBJECTIVE:   Regulo Roth is a 59 year old male who presents to clinic today for the following health issues:    Patient here for medication refill. He has neuropathy bilaterally. He notes this is most likely due to his intense antibiotic regimen 1 year ago after an infected knee replacement. He notes his pain is worse at night when he is sleeping. He also notes that he has tried everything, and the Oxycodone does help him some.         Patient Active Problem List    Diagnosis Date Noted     Benign prostatic hyperplasia with urinary retention 06/27/2019     Priority: Medium     Epidural lipomatosis 08/04/2017     Priority: Medium     Chronic idiopathic axonal polyneuropathy 06/21/2017     Priority: Medium     Neuropathy 06/17/2016     Priority: Medium     Sleep apnea, obstructive 06/30/2015     Priority: Medium     Controlled substance agreement signed 6/27/19 09/04/2014     Priority: Medium     Overview:   Updated 6/3/2016       GERD (gastroesophageal reflux disease) 11/25/2013     Priority: Medium     Health care maintenance 11/25/2013     Priority: Medium     Vitamin D deficiency 11/15/2013     Priority: Medium     Paresthesia of bilateral legs 11/11/2013     Priority: Medium     Rhinitis medicamentosa 03/08/2013     Priority: Medium     Asthma 02/22/2012     Priority: Medium     Eye irritation 10/06/2011     Priority: Medium     Osteoarthritis, knee 06/22/2011     Priority: Medium     Plantar fascial fibromatosis 06/22/2011     Priority: Medium     Seborrheic dermatitis 06/22/2011     Priority: Medium     Past Medical History:   Diagnosis Date     Dermatitis     No Comments Provided     Gastro-esophageal reflux disease without esophagitis     No Comments Provided     Other seasonal allergic rhinitis     No Comments Provided     Primary osteoarthritis of one knee     No Comments Provided     Uncomplicated asthma     No Comments Provided      Past Surgical History:   Procedure Laterality Date      ARTHROPLASTY KNEE      August 2012,left - became infected require debridment     ARTHROSCOPY KNEE      1981,acl and medial meniscus repair [Other]     COLONOSCOPY  12/09/2013    ,F/U 2023     ESOPHAGOSCOPY, GASTROSCOPY, DUODENOSCOPY (EGD), COMBINED      12/9/13,EGD     FUSION CERVICAL ANTERIOR ONE LEVEL      c6-7     INSERT STIMULATOR AND LEADS INTERNAL DORSAL COLUMN  04/2018     OTHER SURGICAL HISTORY      ,HERNIA REPAIR     OTHER SURGICAL HISTORY      ,HERNIA REPAIR     OTHER SURGICAL HISTORY      2012,[id number],DEBRIDEMENT,Left,knee  infection following surgery     OTHER SURGICAL HISTORY      2012,27599.3,[place] KNEE MANIPULATION     OTHER SURGICAL HISTORY      206904,MULTIPLE SLEEP LATENCY TEST WITHOUT CPAP,uses C PAP     TONSILLECTOMY, ADENOIDECTOMY, COMBINED      No Comments Provided     Family History   Problem Relation Age of Onset     Other - See Comments Father         Alzheimer's     Cancer Sister         Cancer,Leukemia     Current Outpatient Medications   Medication Sig Dispense Refill     albuterol (PROAIR HFA/PROVENTIL HFA/VENTOLIN HFA) 108 (90 BASE) MCG/ACT Inhaler Inhale 2 puffs into the lungs every 4 hours as needed       Alpha Lipoic Acid 200 MG CAPS Take 200 mg by mouth 3 times daily 90 capsule 3     aspirin 81 MG tablet Take 81 mg by mouth daily       cholecalciferol (VITAMIN D-1000 MAX ST) 1000 UNITS TABS Take 1,000 Units by mouth daily       esomeprazole (NEXIUM) 40 MG DR capsule TAKE 1 CAPSULE BY MOUTH ONCE DAILY BEFORE A MEAL. 90 capsule 3     fluocinonide (LIDEX) 0.05 % solution        fluticasone (FLONASE) 50 MCG/ACT spray Spray 2 sprays into both nostrils daily       Lidocaine 0.5 % AERO Externally apply 1 spray topically 4 times daily as needed (neuropathy) 170 g 11     mometasone (ELOCON) 0.1 % ointment        order for DME CPAP, heated humidifier, mask, headgear, filters and tubing. For home use. Pressure:  8   Length of Need:       oxyCODONE IR (ROXICODONE) 10 MG tablet Take 1  tablet (10 mg) by mouth every 6 hours as needed for severe pain 120 tablet 0     tamsulosin (FLOMAX) 0.4 MG capsule Take 1 capsule (0.4 mg) by mouth daily 90 capsule 3     VITAMIN E COMPLEX PO Take by mouth daily       No Known Allergies    Review of Systems   Constitutional: Negative.  Negative for fatigue and fever.   HENT: Negative for congestion.    Respiratory: Negative for shortness of breath.    Cardiovascular: Negative for chest pain.   Gastrointestinal: Negative for abdominal pain.   Musculoskeletal: Negative for back pain.   Neurological: Positive for numbness.        OBJECTIVE:     /66 (BP Location: Right arm, Patient Position: Sitting, Cuff Size: Adult Large)   Pulse 66   Temp 98.6  F (37  C) (Tympanic)   Resp 16   Wt 132 kg (291 lb)   BMI 41.75 kg/m    Body mass index is 41.75 kg/m .  Physical Exam  Constitutional:       Appearance: Normal appearance.   Cardiovascular:      Rate and Rhythm: Normal rate and regular rhythm.   Pulmonary:      Effort: Pulmonary effort is normal.      Breath sounds: Normal breath sounds.   Abdominal:      General: Bowel sounds are normal.   Skin:     General: Skin is warm and dry.   Neurological:      Mental Status: He is alert.   Psychiatric:         Mood and Affect: Mood normal.         Diagnostic Test Results:  No results found for this or any previous visit (from the past 24 hour(s)).    ASSESSMENT/PLAN:         1. Chronic idiopathic axonal polyneuropathy  Stable, Patient still has chronic pain bilaterally. Worse at night. Follow up in 1 month for refills.   - oxyCODONE IR (ROXICODONE) 10 MG tablet; Take 1 tablet (10 mg) by mouth every 6 hours as needed for severe pain  Dispense: 120 tablet; Refill: 0  - FLU VACCINE, 3 YRS +, IM (FLUZONE)  - VACCINE ADMINISTRATION, INITIAL    Yadira Muhammad, MS3, RPAP student   Working under the supervision of MD Kaiden Galarza MD   [facility] AND [facility]    Pt was seen and examined by me as well as  Yadira Muhammad, MS 3, I was present for the exam portion also.      Kaiden Chavez MD on 10/15/2019 at 10:58 AM

## 2019-10-15 ASSESSMENT — ASTHMA QUESTIONNAIRES: ACT_TOTALSCORE: 24

## 2019-11-04 ENCOUNTER — HOSPITAL ENCOUNTER (OUTPATIENT)
Dept: GENERAL RADIOLOGY | Facility: OTHER | Age: 59
Discharge: HOME | End: 2019-11-04
Attending: FAMILY MEDICINE | Admitting: FAMILY MEDICINE
Payer: COMMERCIAL

## 2019-11-04 ENCOUNTER — OFFICE VISIT (OUTPATIENT)
Dept: FAMILY MEDICINE | Facility: OTHER | Age: 59
End: 2019-11-04
Attending: FAMILY MEDICINE
Payer: COMMERCIAL

## 2019-11-04 VITALS
HEART RATE: 72 BPM | WEIGHT: 299.3 LBS | BODY MASS INDEX: 42.95 KG/M2 | SYSTOLIC BLOOD PRESSURE: 144 MMHG | DIASTOLIC BLOOD PRESSURE: 72 MMHG | TEMPERATURE: 97.7 F | RESPIRATION RATE: 16 BRPM

## 2019-11-04 DIAGNOSIS — M25.561 LATERAL KNEE PAIN, RIGHT: Primary | ICD-10-CM

## 2019-11-04 DIAGNOSIS — M25.561 LATERAL KNEE PAIN, RIGHT: ICD-10-CM

## 2019-11-04 PROCEDURE — 99213 OFFICE O/P EST LOW 20 MIN: CPT | Performed by: FAMILY MEDICINE

## 2019-11-04 PROCEDURE — 73560 X-RAY EXAM OF KNEE 1 OR 2: CPT | Mod: LT

## 2019-11-04 RX ORDER — MELOXICAM 15 MG/1
15 TABLET ORAL DAILY
Qty: 14 TABLET | Refills: 0 | Status: SHIPPED | OUTPATIENT
Start: 2019-11-04 | End: 2019-11-04

## 2019-11-04 ASSESSMENT — PAIN SCALES - GENERAL: PAINLEVEL: EXTREME PAIN (9)

## 2019-11-04 NOTE — PROGRESS NOTES
"Nursing Notes:   Aimee Abraham LPN  11/4/2019  4:15 PM  Signed  Chief Complaint   Patient presents with     Knee Pain     right knee pain x 2 months,  Slipped today, caught himself on that knee and now feels a lot worse.        Initial BP (!) 144/72 (BP Location: Right arm, Patient Position: Sitting, Cuff Size: Adult Regular)   Pulse 72   Temp 97.7  F (36.5  C) (Tympanic)   Resp 16   Wt 135.8 kg (299 lb 4.8 oz)   BMI 42.95 kg/m    Estimated body mass index is 42.95 kg/m  as calculated from the following:    Height as of 1/4/19: 1.778 m (5' 10\").    Weight as of this encounter: 135.8 kg (299 lb 4.8 oz).  Medication Reconciliation: Completed     Aimee Abraham LPN    SUBJECTIVE:   Regulo Roth is a 59 year old male who presents to clinic today for the following health issues:    HPI  Regulo is here with his wife for concerns of R knee pain.  Has bothered him off and on for the last two months.  However today he started to slip and caught himself.  In the process however, he caused an increase in his R knee pain.  Describes it right in the middle knee - outer aspect.  Standing or having the knee straight is better; but bending causes more pain.  No prior knee issues on R knee; however has had replacement on L with Dr. Soler (and complication of infection).    Patient Active Problem List    Diagnosis Date Noted     Benign prostatic hyperplasia with urinary retention 06/27/2019     Priority: Medium     Epidural lipomatosis 08/04/2017     Priority: Medium     Chronic idiopathic axonal polyneuropathy 06/21/2017     Priority: Medium     Neuropathy 06/17/2016     Priority: Medium     Sleep apnea, obstructive 06/30/2015     Priority: Medium     Controlled substance agreement signed 6/27/19 09/04/2014     Priority: Medium     Overview:   Updated 6/3/2016       GERD (gastroesophageal reflux disease) 11/25/2013     Priority: Medium     Health care maintenance 11/25/2013     Priority: Medium     Vitamin D " deficiency 11/15/2013     Priority: Medium     Paresthesia of bilateral legs 11/11/2013     Priority: Medium     Rhinitis medicamentosa 03/08/2013     Priority: Medium     Asthma 02/22/2012     Priority: Medium     Eye irritation 10/06/2011     Priority: Medium     Osteoarthritis, knee 06/22/2011     Priority: Medium     Plantar fascial fibromatosis 06/22/2011     Priority: Medium     Seborrheic dermatitis 06/22/2011     Priority: Medium     Past Medical History:   Diagnosis Date     Dermatitis     No Comments Provided     Gastro-esophageal reflux disease without esophagitis     No Comments Provided     Other seasonal allergic rhinitis     No Comments Provided     Primary osteoarthritis of one knee     No Comments Provided     Uncomplicated asthma     No Comments Provided      Past Surgical History:   Procedure Laterality Date     ARTHROPLASTY KNEE      August 2012,left - became infected require debridment     ARTHROSCOPY KNEE      1981,acl and medial meniscus repair [Other]     COLONOSCOPY  12/09/2013    ,F/U 2023     ESOPHAGOSCOPY, GASTROSCOPY, DUODENOSCOPY (EGD), COMBINED      12/9/13,EGD     FUSION CERVICAL ANTERIOR ONE LEVEL      c6-7     INSERT STIMULATOR AND LEADS INTERNAL DORSAL COLUMN  04/2018     OTHER SURGICAL HISTORY      ,HERNIA REPAIR     OTHER SURGICAL HISTORY      ,HERNIA REPAIR     OTHER SURGICAL HISTORY      2012,205436,DEBRIDEMENT,Left,knee  infection following surgery     OTHER SURGICAL HISTORY      2012,27599.3,[place] KNEE MANIPULATION     OTHER SURGICAL HISTORY      206904,MULTIPLE SLEEP LATENCY TEST WITHOUT CPAP,uses C PAP     TONSILLECTOMY, ADENOIDECTOMY, COMBINED      No Comments Provided     Family History   Problem Relation Age of Onset     Other - See Comments Father         Alzheimer's     Cancer Sister         Cancer,Leukemia     Social History     Tobacco Use     Smoking status: Former Smoker     Packs/day: 1.00     Years: 18.00     Pack years: 18.00     Smokeless tobacco: Never  Used   Substance Use Topics     Alcohol use: Yes     Alcohol/week: 8.3 standard drinks     Comment: Alcoholic Drinks/day: occ     Social History     Patient does not qualify to have social determinant information on file (likely too young).   Social History Narrative    works as an , ,  two children    Preload  10/25/2013    **pre upload 02/07/2013**     Current Outpatient Medications   Medication Sig Dispense Refill     albuterol (PROAIR HFA/PROVENTIL HFA/VENTOLIN HFA) 108 (90 BASE) MCG/ACT Inhaler Inhale 2 puffs into the lungs every 4 hours as needed       aspirin 81 MG tablet Take 81 mg by mouth daily       cholecalciferol (VITAMIN D-1000 MAX ST) 1000 UNITS TABS Take 1,000 Units by mouth daily       esomeprazole (NEXIUM) 40 MG DR capsule TAKE 1 CAPSULE BY MOUTH ONCE DAILY BEFORE A MEAL. 90 capsule 3     fluocinonide (LIDEX) 0.05 % solution        fluticasone (FLONASE) 50 MCG/ACT spray Spray 2 sprays into both nostrils daily       Lidocaine 0.5 % AERO Externally apply 1 spray topically 4 times daily as needed (neuropathy) 170 g 11     mometasone (ELOCON) 0.1 % ointment        order for DME CPAP, heated humidifier, mask, headgear, filters and tubing. For home use. Pressure:  8   Length of Need:       oxyCODONE IR (ROXICODONE) 10 MG tablet Take 1 tablet (10 mg) by mouth every 6 hours as needed for severe pain 120 tablet 0     tamsulosin (FLOMAX) 0.4 MG capsule Take 1 capsule (0.4 mg) by mouth daily 90 capsule 3     VITAMIN E COMPLEX PO Take by mouth daily       No Known Allergies      Review of Systems   Constitutional: Positive for activity change. Negative for appetite change, chills, fatigue, fever and unexpected weight change.   Skin: Negative for rash and wound.   Neurological: Negative for weakness, numbness and paresthesias.   All other systems reviewed and are negative.     OBJECTIVE:     BP (!) 144/72 (BP Location: Right arm, Patient Position: Sitting, Cuff Size: Adult Regular)   Pulse 72    Temp 97.7  F (36.5  C) (Tympanic)   Resp 16   Wt 135.8 kg (299 lb 4.8 oz)   BMI 42.95 kg/m    Body mass index is 42.95 kg/m .  Physical Exam  Vitals signs and nursing note reviewed.   Constitutional:       Appearance: Normal appearance.   HENT:      Head: Normocephalic and atraumatic.   Neck:      Musculoskeletal: Normal range of motion.   Cardiovascular:      Rate and Rhythm: Normal rate.   Pulmonary:      Effort: Pulmonary effort is normal.   Musculoskeletal:      Right knee: He exhibits decreased range of motion (flexion due to pain; and limited in extreme). He exhibits no swelling, no effusion, no deformity, no laceration, no erythema, normal alignment, no LCL laxity, normal patellar mobility, no bony tenderness, normal meniscus and no MCL laxity. Tenderness found. Lateral joint line and LCL tenderness noted.   Neurological:      Mental Status: He is alert.     Diagnostic Test Results:  Knee XR - mild narrowing possible; no obvious deformity.  Radiology read - mild tricompartmental degenerative disease.  I personally reviewed the imaging results with Regulo and his wife.    ASSESSMENT/PLAN:     1. Lateral knee pain, right  X-ray reassuring today, is likely a knee strain at this point.  He will be taking scheduled ibuprofen 800 mg 3 times daily x10 days.  He has follow-up with his PCP for medications and possible physical within the next 8 days.  Discussed that he can follow-up with his PCP on this and they can decide on next step if not improved at that time.  We discussed possible injection versus possible need for MRI.  - XR Knee Standing 2v  Bilateral & 2v Right; Future      Siobhan Cody DO  [facility]

## 2019-11-04 NOTE — NURSING NOTE
"Chief Complaint   Patient presents with     Knee Pain     right knee pain x 2 months,  Slipped today, caught himself on that knee and now feels a lot worse.        Initial BP (!) 144/72 (BP Location: Right arm, Patient Position: Sitting, Cuff Size: Adult Regular)   Pulse 72   Temp 97.7  F (36.5  C) (Tympanic)   Resp 16   Wt 135.8 kg (299 lb 4.8 oz)   BMI 42.95 kg/m   Estimated body mass index is 42.95 kg/m  as calculated from the following:    Height as of 1/4/19: 1.778 m (5' 10\").    Weight as of this encounter: 135.8 kg (299 lb 4.8 oz).  Medication Reconciliation: Completed     Aimee Abraham LPN  " 18

## 2019-11-05 ASSESSMENT — ENCOUNTER SYMPTOMS
APPETITE CHANGE: 0
FATIGUE: 0
PARESTHESIAS: 0
NUMBNESS: 0
FEVER: 0
UNEXPECTED WEIGHT CHANGE: 0
ACTIVITY CHANGE: 1
WEAKNESS: 0
WOUND: 0
CHILLS: 0

## 2019-11-12 ENCOUNTER — OFFICE VISIT (OUTPATIENT)
Dept: FAMILY MEDICINE | Facility: OTHER | Age: 59
End: 2019-11-12
Attending: FAMILY MEDICINE
Payer: COMMERCIAL

## 2019-11-12 VITALS
OXYGEN SATURATION: 97 % | HEIGHT: 70 IN | WEIGHT: 291 LBS | SYSTOLIC BLOOD PRESSURE: 136 MMHG | TEMPERATURE: 98.6 F | HEART RATE: 64 BPM | DIASTOLIC BLOOD PRESSURE: 72 MMHG | RESPIRATION RATE: 16 BRPM | BODY MASS INDEX: 41.66 KG/M2

## 2019-11-12 DIAGNOSIS — R73.09 ELEVATED GLUCOSE: ICD-10-CM

## 2019-11-12 DIAGNOSIS — Z00.00 ROUTINE GENERAL MEDICAL EXAMINATION AT A HEALTH CARE FACILITY: Primary | ICD-10-CM

## 2019-11-12 DIAGNOSIS — G60.9 CHRONIC IDIOPATHIC AXONAL POLYNEUROPATHY: ICD-10-CM

## 2019-11-12 LAB
CHOLEST SERPL-MCNC: 285 MG/DL
HBA1C MFR BLD: 6.1 % (ref 4–6)
HDLC SERPL-MCNC: 36 MG/DL (ref 23–92)
LDLC SERPL CALC-MCNC: ABNORMAL MG/DL
NONHDLC SERPL-MCNC: 249 MG/DL
TRIGL SERPL-MCNC: 903 MG/DL

## 2019-11-12 PROCEDURE — 83036 HEMOGLOBIN GLYCOSYLATED A1C: CPT | Mod: ZL | Performed by: FAMILY MEDICINE

## 2019-11-12 PROCEDURE — 36415 COLL VENOUS BLD VENIPUNCTURE: CPT | Mod: ZL | Performed by: FAMILY MEDICINE

## 2019-11-12 PROCEDURE — 99396 PREV VISIT EST AGE 40-64: CPT | Performed by: FAMILY MEDICINE

## 2019-11-12 PROCEDURE — 80061 LIPID PANEL: CPT | Mod: ZL | Performed by: FAMILY MEDICINE

## 2019-11-12 RX ORDER — OXYCODONE HYDROCHLORIDE 10 MG/1
10 TABLET ORAL EVERY 6 HOURS PRN
Qty: 120 TABLET | Refills: 0 | Status: SHIPPED | OUTPATIENT
Start: 2019-11-12 | End: 2019-12-09

## 2019-11-12 ASSESSMENT — ANXIETY QUESTIONNAIRES
7. FEELING AFRAID AS IF SOMETHING AWFUL MIGHT HAPPEN: NOT AT ALL
IF YOU CHECKED OFF ANY PROBLEMS ON THIS QUESTIONNAIRE, HOW DIFFICULT HAVE THESE PROBLEMS MADE IT FOR YOU TO DO YOUR WORK, TAKE CARE OF THINGS AT HOME, OR GET ALONG WITH OTHER PEOPLE: NOT DIFFICULT AT ALL
GAD7 TOTAL SCORE: 0
3. WORRYING TOO MUCH ABOUT DIFFERENT THINGS: NOT AT ALL
1. FEELING NERVOUS, ANXIOUS, OR ON EDGE: NOT AT ALL
2. NOT BEING ABLE TO STOP OR CONTROL WORRYING: NOT AT ALL
5. BEING SO RESTLESS THAT IT IS HARD TO SIT STILL: NOT AT ALL
6. BECOMING EASILY ANNOYED OR IRRITABLE: NOT AT ALL

## 2019-11-12 ASSESSMENT — PAIN SCALES - GENERAL: PAINLEVEL: EXTREME PAIN (9)

## 2019-11-12 ASSESSMENT — MIFFLIN-ST. JEOR: SCORE: 2150.19

## 2019-11-12 ASSESSMENT — PATIENT HEALTH QUESTIONNAIRE - PHQ9: 5. POOR APPETITE OR OVEREATING: NOT AT ALL

## 2019-11-12 NOTE — LETTER
November 14, 2019      Regulo Roth  [address] [place] [address]        Dear Regulo,     The triglycerides are extremely high.  This can contribute to neuropathy.  Options are to either start meds to lower them, lose a significant amount of weight, or repeat the labs when you have been fasting for over 8 hours.  Let me know what you would like.  No diabetes.    Results for orders placed or performed in visit on 11/12/19   Hemoglobin A1c     Status: Abnormal   Result Value Ref Range    Hemoglobin A1C 6.1 (H) 4.0 - 6.0 %   Lipid Panel     Status: Abnormal   Result Value Ref Range    Cholesterol 285 (H) <200 mg/dL    Triglycerides 903 (H) <150 mg/dL    HDL Cholesterol 36 23 - 92 mg/dL    LDL Cholesterol Calculated  <100 mg/dL     Cannot estimate LDL when triglyceride exceeds 400 mg/dL    Non HDL Cholesterol 249 (H) <130 mg/dL       Sincerely,        Kaiden Chavez MD

## 2019-11-12 NOTE — PROGRESS NOTES
3  SUBJECTIVE:   CC: Regulo Roth is an 59 year old male who presents for preventive health visit.     Healthy Habits:    Do you get at least three servings of calcium containing foods daily (dairy, green leafy vegetables, etc.)? yes    Amount of exercise or daily activities, outside of work: 3 day(s) per week    Problems taking medications regularly No    Medication side effects: No    Have you had an eye exam in the past two years? yes    Do you see a dentist twice per year? yes    Do you have sleep apnea, excessive snoring or daytime drowsiness?Uses CPAP nightly and it works very well      Due for refill on oxycodone.  Symptoms are about the same month to month, but significant worse over the last year.  Significant foot neuropathy. Has resumed swimming recently, has a hard time walking on the deck.  Wears shoes to swim.   Tox screen was as expected 9/16/19. Is on medical THC too.      Stopped the Flomax, felt it was not helping.      Today's PHQ-2 Score:   PHQ-2 ( 1999 Pf[employer]) 11/12/2019 11/4/2019   Q1: Little interest or pleasure in doing things 0 0   Q2: Feeling down, depressed or hopeless 0 0   PHQ-2 Score 0 0       Abuse: Current or Past(Physical, Sexual or Emotional)- No  Do you feel safe in your environment? Yes        Social History     Tobacco Use     Smoking status: Former Smoker     Packs/day: 1.00     Years: 18.00     Pack years: 18.00     Smokeless tobacco: Never Used   Substance Use Topics     Alcohol use: Yes     Alcohol/week: 8.3 standard drinks     Comment: Alcoholic Drinks/day: occ     If you drink alcohol do you typically have >3 drinks per day or >7 drinks per week? Yes - AUDIT SCORE:     No flowsheet data found.                      Last PSA: No results found for: PSA    Reviewed orders with patient. Reviewed health maintenance and updated orders accordingly - Yes  Lab work is in process  Current Outpatient Medications   Medication Sig Dispense Refill     albuterol (PROAIR  HFA/PROVENTIL HFA/VENTOLIN HFA) 108 (90 BASE) MCG/ACT Inhaler Inhale 2 puffs into the lungs every 4 hours as needed       aspirin 81 MG tablet Take 81 mg by mouth daily       cholecalciferol (VITAMIN D-1000 MAX ST) 1000 UNITS TABS Take 1,000 Units by mouth daily       esomeprazole (NEXIUM) 40 MG DR capsule TAKE 1 CAPSULE BY MOUTH ONCE DAILY BEFORE A MEAL. 90 capsule 3     fluocinonide (LIDEX) 0.05 % solution        fluticasone (FLONASE) 50 MCG/ACT spray Spray 2 sprays into both nostrils daily       Lidocaine 0.5 % AERO Externally apply 1 spray topically 4 times daily as needed (neuropathy) 170 g 11     mometasone (ELOCON) 0.1 % ointment        order for DME CPAP, heated humidifier, mask, headgear, filters and tubing. For home use. Pressure:  8   Length of Need:       oxyCODONE IR (ROXICODONE) 10 MG tablet Take 1 tablet (10 mg) by mouth every 6 hours as needed for severe pain 120 tablet 0     tamsulosin (FLOMAX) 0.4 MG capsule Take 1 capsule (0.4 mg) by mouth daily 90 capsule 3     VITAMIN E COMPLEX PO Take by mouth daily       No Known Allergies    Reviewed and updated as needed this visit by clinical staff  Tobacco  Allergies  Meds  Med Hx  Soc Hx        Reviewed and updated as needed this visit by Provider        Past Medical History:   Diagnosis Date     Dermatitis     No Comments Provided     Gastro-esophageal reflux disease without esophagitis     No Comments Provided     Other seasonal allergic rhinitis     No Comments Provided     Primary osteoarthritis of one knee     No Comments Provided     Uncomplicated asthma     No Comments Provided      Past Surgical History:   Procedure Laterality Date     ARTHROPLASTY KNEE      August 2012,left - became infected require debridment     ARTHROSCOPY KNEE      1981,acl and medial meniscus repair [Other]     COLONOSCOPY  12/09/2013    ,F/U 2023     ESOPHAGOSCOPY, GASTROSCOPY, DUODENOSCOPY (EGD), COMBINED      12/9/13,EGD     FUSION CERVICAL ANTERIOR ONE LEVEL       "c6-7     INSERT STIMULATOR AND LEADS INTERNAL DORSAL COLUMN  04/2018     OTHER SURGICAL HISTORY      ,HERNIA REPAIR     OTHER SURGICAL HISTORY      ,HERNIA REPAIR     OTHER SURGICAL HISTORY      2012,[id number],DEBRIDEMENT,Left,knee  infection following surgery     OTHER SURGICAL HISTORY      2012,27599.3,[place] KNEE MANIPULATION     OTHER SURGICAL HISTORY      206904,MULTIPLE SLEEP LATENCY TEST WITHOUT CPAP,uses C PAP     TONSILLECTOMY, ADENOIDECTOMY, COMBINED      No Comments Provided       ROS:  CONSTITUTIONAL: NEGATIVE for fever, chills, change in weight  INTEGUMENTARY/SKIN: NEGATIVE for worrisome rashes, moles or lesions  EYES: NEGATIVE for vision changes or irritation  ENT: NEGATIVE for ear, mouth and throat problems  RESP: NEGATIVE for significant cough or SOB  CV: NEGATIVE for chest pain, palpitations or peripheral edema  GI: NEGATIVE for nausea, abdominal pain, heartburn, or change in bowel habits   male: negative for dysuria, hematuria, decreased urinary stream, erectile dysfunction, urethral discharge  MUSCULOSKELETAL: NEGATIVE for significant arthralgias or myalgia  NEURO: paresthesias of feet  PSYCHIATRIC: NEGATIVE for changes in mood or affect    OBJECTIVE:   /72 (BP Location: Right arm, Patient Position: Sitting, Cuff Size: Adult Large)   Pulse 64   Temp 98.6  F (37  C) (Tympanic)   Resp 16   Ht 1.784 m (5' 10.25\")   Wt 132 kg (291 lb)   SpO2 97%   BMI 41.46 kg/m    EXAM:  GENERAL: healthy, alert and no distress  EYES: Eyes grossly normal to inspection, PERRL and conjunctivae and sclerae normal  HENT: ear canals and TM's normal, nose and mouth without ulcers or lesions  NECK: no adenopathy, no asymmetry, masses, or scars and thyroid normal to palpation  RESP: lungs clear to auscultation - no rales, rhonchi or wheezes  CV: regular rate and rhythm, normal S1 S2, no S3 or S4, no murmur, click or rub, no peripheral edema and peripheral pulses strong  ABDOMEN: soft, nontender, no " "hepatosplenomegaly, no masses and bowel sounds normal  MS: no gross musculoskeletal defects noted, no edema  SKIN: no suspicious lesions or rashes  NEURO: Normal strength and tone, mentation intact and speech normal  PSYCH: mentation appears normal, affect normal/bright        ASSESSMENT/PLAN:       ICD-10-CM    1. Routine general medical examination at a health care facility Z00.00 Lipid Panel   2. Chronic idiopathic axonal polyneuropathy G60.9 oxyCODONE IR (ROXICODONE) 10 MG tablet   3. Elevated glucose R73.09 Hemoglobin A1c       COUNSELING:  Reviewed preventive health counseling, as reflected in patient instructions       Regular exercise       Healthy diet/nutrition       Colon cancer screening       Prostate cancer screening    Estimated body mass index is 41.46 kg/m  as calculated from the following:    Height as of this encounter: 1.784 m (5' 10.25\").    Weight as of this encounter: 132 kg (291 lb).    Weight management plan: Discussed healthy diet and exercise guidelines     reports that he has quit smoking. He has a 18.00 pack-year smoking history. He has never used smokeless tobacco.      Counseling Resources:  ATP IV Guidelines  Pooled Cohorts Equation Calculator  FRAX Risk Assessment  ICSI Preventive Guidelines  Dietary Guidelines for Americans, 2010  USDA's MyPlate  ASA Prophylaxis  Lung CA Screening    Kaiden Chavez MD  [facility]  "

## 2019-11-12 NOTE — NURSING NOTE
"Coming in for a physical check up    Chief Complaint   Patient presents with     Physical     check up       Initial /72 (BP Location: Right arm, Patient Position: Sitting, Cuff Size: Adult Large)   Pulse 64   Temp 98.6  F (37  C) (Tympanic)   Resp 16   Ht 1.784 m (5' 10.25\")   Wt 132 kg (291 lb)   SpO2 97%   BMI 41.46 kg/m   Estimated body mass index is 41.46 kg/m  as calculated from the following:    Height as of this encounter: 1.784 m (5' 10.25\").    Weight as of this encounter: 132 kg (291 lb).  Medication Reconciliation: complete    Jayashree Aguilar LPN  "

## 2019-11-13 ASSESSMENT — ASTHMA QUESTIONNAIRES: ACT_TOTALSCORE: 24

## 2019-11-13 ASSESSMENT — ANXIETY QUESTIONNAIRES: GAD7 TOTAL SCORE: 0

## 2019-11-19 ENCOUNTER — TELEPHONE (OUTPATIENT)
Dept: FAMILY MEDICINE | Facility: OTHER | Age: 59
End: 2019-11-19

## 2019-11-19 DIAGNOSIS — E78.1 HYPERTRIGLYCERIDEMIA: Primary | ICD-10-CM

## 2019-11-19 NOTE — TELEPHONE ENCOUNTER
He is going to make an a fasting labs at his next appointment, received a letter, needs orders    TEOFILO Aguilar LPN on 11/19/2019 at 9:22 AM

## 2019-11-19 NOTE — TELEPHONE ENCOUNTER
Transferred to scheduling to make lab only appt.  Norma J. Gosselin, LPN .......  11/19/2019  9:54 AM

## 2019-12-01 DIAGNOSIS — K21.9 GASTROESOPHAGEAL REFLUX DISEASE, ESOPHAGITIS PRESENCE NOT SPECIFIED: ICD-10-CM

## 2019-12-02 RX ORDER — ESOMEPRAZOLE MAGNESIUM 40 MG/1
CAPSULE, DELAYED RELEASE ORAL
Qty: 90 CAPSULE | Refills: 3 | Status: SHIPPED | OUTPATIENT
Start: 2019-12-02 | End: 2020-04-14

## 2019-12-04 DIAGNOSIS — E78.1 HYPERTRIGLYCERIDEMIA: ICD-10-CM

## 2019-12-04 DIAGNOSIS — E78.1 HYPERTRIGLYCERIDEMIA: Primary | ICD-10-CM

## 2019-12-04 LAB
CHOLEST SERPL-MCNC: 257 MG/DL
HDLC SERPL-MCNC: 36 MG/DL (ref 23–92)
LDLC SERPL CALC-MCNC: ABNORMAL MG/DL
NONHDLC SERPL-MCNC: 221 MG/DL
TRIGL SERPL-MCNC: 630 MG/DL
TSH SERPL DL<=0.05 MIU/L-ACNC: 2.11 IU/ML (ref 0.34–5.6)

## 2019-12-04 PROCEDURE — 80061 LIPID PANEL: CPT | Mod: ZL | Performed by: FAMILY MEDICINE

## 2019-12-04 PROCEDURE — 36415 COLL VENOUS BLD VENIPUNCTURE: CPT | Mod: ZL | Performed by: FAMILY MEDICINE

## 2019-12-04 PROCEDURE — 84443 ASSAY THYROID STIM HORMONE: CPT | Mod: ZL | Performed by: FAMILY MEDICINE

## 2019-12-04 RX ORDER — GEMFIBROZIL 600 MG/1
600 TABLET, FILM COATED ORAL
Qty: 180 TABLET | Refills: 3 | Status: SHIPPED | OUTPATIENT
Start: 2019-12-04 | End: 2019-12-09

## 2019-12-09 ENCOUNTER — OFFICE VISIT (OUTPATIENT)
Dept: FAMILY MEDICINE | Facility: OTHER | Age: 59
End: 2019-12-09
Attending: FAMILY MEDICINE
Payer: COMMERCIAL

## 2019-12-09 VITALS
HEART RATE: 66 BPM | BODY MASS INDEX: 42.03 KG/M2 | OXYGEN SATURATION: 97 % | RESPIRATION RATE: 16 BRPM | SYSTOLIC BLOOD PRESSURE: 136 MMHG | WEIGHT: 295 LBS | TEMPERATURE: 98.6 F | DIASTOLIC BLOOD PRESSURE: 72 MMHG

## 2019-12-09 DIAGNOSIS — G60.9 CHRONIC IDIOPATHIC AXONAL POLYNEUROPATHY: ICD-10-CM

## 2019-12-09 DIAGNOSIS — E78.1 HYPERTRIGLYCERIDEMIA: ICD-10-CM

## 2019-12-09 PROCEDURE — 99213 OFFICE O/P EST LOW 20 MIN: CPT | Performed by: FAMILY MEDICINE

## 2019-12-09 RX ORDER — OXYCODONE HYDROCHLORIDE 10 MG/1
10 TABLET ORAL EVERY 6 HOURS PRN
Qty: 120 TABLET | Refills: 0 | Status: SHIPPED | OUTPATIENT
Start: 2019-12-09 | End: 2020-01-07

## 2019-12-09 ASSESSMENT — ENCOUNTER SYMPTOMS
SLEEP DISTURBANCE: 1
ABDOMINAL PAIN: 0
FATIGUE: 0
NUMBNESS: 1
PARESTHESIAS: 1

## 2019-12-09 ASSESSMENT — PAIN SCALES - GENERAL: PAINLEVEL: EXTREME PAIN (8)

## 2019-12-09 NOTE — PROGRESS NOTES
SUBJECTIVE:   Regulo Roth is a 59 year old male who presents to clinic today for the following health issues:    HPI  Follow up on pain and high triglycerides. Pain is worse in the last few weeks.  He has changed his stimulator settings, helps a little.  Increase in pain med dose has helped him sleep better.  Still finds them wearing off before the 6 hour dosing interval.  Pain is worst in his feet.    Regarding his triglycerides, the first reading was not fasting.  2nd was fasting. No abdomen pain at all.  He has recently resumed swimming. 3 days last week.  In the past he lost over 40# in 9 weeks with reduced calories, increased activity.  Drinks perhaps 3 beers per week total now.      Recent Labs   Lab Test 12/04/19  0920 11/12/19  1331 04/17/18  1104 10/29/13  1519   A1C  --  6.1*  --   --    LDL Cannot estimate LDL when triglyceride exceeds 400 mg/dL Cannot estimate LDL when triglyceride exceeds 400 mg/dL  --   --    HDL 36 36  --   --    TRIG 630* 903*  --   --    ALT  --   --  44  --    CR  --   --  0.94 1.14   GFRESTIMATED  --   --  83  --    GFRESTBLACK  --   --  >90 >60   POTASSIUM  --   --  4.2 4.6              Patient Active Problem List    Diagnosis Date Noted     Hypertriglyceridemia 12/04/2019     Priority: Medium     Benign prostatic hyperplasia with urinary retention 06/27/2019     Priority: Medium     Epidural lipomatosis 08/04/2017     Priority: Medium     Chronic idiopathic axonal polyneuropathy 06/21/2017     Priority: Medium     Neuropathy 06/17/2016     Priority: Medium     Sleep apnea, obstructive 06/30/2015     Priority: Medium     Controlled substance agreement signed 6/27/19 09/04/2014     Priority: Medium     Overview:   Updated 6/3/2016       GERD (gastroesophageal reflux disease) 11/25/2013     Priority: Medium     Health care maintenance 11/25/2013     Priority: Medium     Vitamin D deficiency 11/15/2013     Priority: Medium     Paresthesia of bilateral legs 11/11/2013      Priority: Medium     Rhinitis medicamentosa 03/08/2013     Priority: Medium     Asthma 02/22/2012     Priority: Medium     Eye irritation 10/06/2011     Priority: Medium     Osteoarthritis, knee 06/22/2011     Priority: Medium     Plantar fascial fibromatosis 06/22/2011     Priority: Medium     Seborrheic dermatitis 06/22/2011     Priority: Medium     Past Surgical History:   Procedure Laterality Date     ARTHROPLASTY KNEE      August 2012,left - became infected require debridment     ARTHROSCOPY KNEE      1981,acl and medial meniscus repair [Other]     COLONOSCOPY  12/09/2013    ,F/U 2023     ESOPHAGOSCOPY, GASTROSCOPY, DUODENOSCOPY (EGD), COMBINED      12/9/13,EGD     FUSION CERVICAL ANTERIOR ONE LEVEL      c6-7     INSERT STIMULATOR AND LEADS INTERNAL DORSAL COLUMN  04/2018     OTHER SURGICAL HISTORY      ,HERNIA REPAIR     OTHER SURGICAL HISTORY      ,HERNIA REPAIR     OTHER SURGICAL HISTORY      2012,[id number],DEBRIDEMENT,Left,knee  infection following surgery     OTHER SURGICAL HISTORY      2012,27599.3,[place] KNEE MANIPULATION     OTHER SURGICAL HISTORY      206904,MULTIPLE SLEEP LATENCY TEST WITHOUT CPAP,uses C PAP     TONSILLECTOMY, ADENOIDECTOMY, COMBINED      No Comments Provided     Social History     Tobacco Use     Smoking status: Former Smoker     Packs/day: 1.00     Years: 18.00     Pack years: 18.00     Smokeless tobacco: Never Used   Substance Use Topics     Alcohol use: Yes     Alcohol/week: 8.3 standard drinks     Comment: Alcoholic Drinks/day: occ     Current Outpatient Medications   Medication Sig Dispense Refill     albuterol (PROAIR HFA/PROVENTIL HFA/VENTOLIN HFA) 108 (90 BASE) MCG/ACT Inhaler Inhale 2 puffs into the lungs every 4 hours as needed       aspirin 81 MG tablet Take 81 mg by mouth daily       cholecalciferol (VITAMIN D-1000 MAX ST) 1000 UNITS TABS Take 1,000 Units by mouth daily       esomeprazole (NEXIUM) 40 MG DR capsule TAKE 1 CAPSULE BY MOUTH EVERY DAY BEFORE A MEAL 90  capsule 3     fluocinonide (LIDEX) 0.05 % solution        fluticasone (FLONASE) 50 MCG/ACT spray Spray 2 sprays into both nostrils daily       Lidocaine 0.5 % AERO Externally apply 1 spray topically 4 times daily as needed (neuropathy) 170 g 11     mometasone (ELOCON) 0.1 % ointment        order for DME CPAP, heated humidifier, mask, headgear, filters and tubing. For home use. Pressure:  8   Length of Need:       oxyCODONE IR (ROXICODONE) 10 MG tablet Take 1 tablet (10 mg) by mouth every 6 hours as needed for severe pain 120 tablet 0     VITAMIN E COMPLEX PO Take by mouth daily       No Known Allergies    Review of Systems   Constitutional: Negative for fatigue.   Gastrointestinal: Negative for abdominal pain.   Neurological: Positive for numbness and paresthesias.   Psychiatric/Behavioral: Positive for sleep disturbance.        OBJECTIVE:     /72   Pulse 66   Temp 98.6  F (37  C) (Tympanic)   Resp 16   Wt 133.8 kg (295 lb)   SpO2 97%   BMI 42.03 kg/m    Body mass index is 42.03 kg/m .  Physical Exam  Constitutional:       Appearance: Normal appearance.   Abdominal:      General: There is no distension.      Tenderness: There is no abdominal tenderness.   Neurological:      General: No focal deficit present.      Mental Status: He is alert and oriented to person, place, and time.   Psychiatric:         Mood and Affect: Mood normal.         Behavior: Behavior normal.         Thought Content: Thought content normal.         Diagnostic Test Results:      ASSESSMENT/PLAN:       (E78.1) Hypertriglyceridemia  Comment: discussed with him diet changes.  Lopid started, repeat labs in 6-8 weeks.  He is considering  Mediterranean diet  Plan:      (G60.9) Chronic idiopathic axonal polyneuropathy  Comment: no significant change  Plan: oxyCODONE IR (ROXICODONE) 10 MG tablet         stable, refilled for #120.        Kaiden Chavez MD  [facility]

## 2019-12-09 NOTE — NURSING NOTE
"Coming in for a medication check up    Chief Complaint   Patient presents with     Recheck Medication     check up       Initial /72   Pulse 66   Temp 98.6  F (37  C) (Tympanic)   Resp 16   Wt 133.8 kg (295 lb)   SpO2 97%   BMI 42.03 kg/m   Estimated body mass index is 42.03 kg/m  as calculated from the following:    Height as of 11/12/19: 1.784 m (5' 10.25\").    Weight as of this encounter: 133.8 kg (295 lb).  Medication Reconciliation: complete    Jayashree Aguilar LPN  "

## 2019-12-26 ENCOUNTER — TELEPHONE (OUTPATIENT)
Dept: FAMILY MEDICINE | Facility: OTHER | Age: 59
End: 2019-12-26

## 2019-12-26 DIAGNOSIS — E78.1 HYPERTRIGLYCERIDEMIA: Primary | ICD-10-CM

## 2019-12-26 NOTE — TELEPHONE ENCOUNTER
Order pending, patient has future office visit and lab appointment in the 6-8 week timeframe as recommended in last office visit.      Tosin Cardoza LPN 12/26/2019 4:05 PM

## 2020-01-07 ENCOUNTER — OFFICE VISIT (OUTPATIENT)
Dept: FAMILY MEDICINE | Facility: OTHER | Age: 60
End: 2020-01-07
Attending: FAMILY MEDICINE
Payer: COMMERCIAL

## 2020-01-07 VITALS
SYSTOLIC BLOOD PRESSURE: 132 MMHG | OXYGEN SATURATION: 93 % | RESPIRATION RATE: 16 BRPM | BODY MASS INDEX: 41.49 KG/M2 | HEART RATE: 60 BPM | WEIGHT: 291.2 LBS | DIASTOLIC BLOOD PRESSURE: 76 MMHG | TEMPERATURE: 98.6 F

## 2020-01-07 DIAGNOSIS — G60.9 CHRONIC IDIOPATHIC AXONAL POLYNEUROPATHY: ICD-10-CM

## 2020-01-07 DIAGNOSIS — E78.1 HYPERTRIGLYCERIDEMIA: Primary | ICD-10-CM

## 2020-01-07 DIAGNOSIS — E78.1 HYPERTRIGLYCERIDEMIA: ICD-10-CM

## 2020-01-07 LAB
CHOLEST SERPL-MCNC: 251 MG/DL
HDLC SERPL-MCNC: 40 MG/DL (ref 23–92)
LDLC SERPL CALC-MCNC: 146 MG/DL
NONHDLC SERPL-MCNC: 211 MG/DL
TRIGL SERPL-MCNC: 326 MG/DL

## 2020-01-07 PROCEDURE — 99213 OFFICE O/P EST LOW 20 MIN: CPT | Performed by: FAMILY MEDICINE

## 2020-01-07 PROCEDURE — 80061 LIPID PANEL: CPT | Mod: ZL | Performed by: FAMILY MEDICINE

## 2020-01-07 PROCEDURE — 36415 COLL VENOUS BLD VENIPUNCTURE: CPT | Mod: ZL | Performed by: FAMILY MEDICINE

## 2020-01-07 RX ORDER — CARBAMAZEPINE 100 MG/1
100 TABLET, EXTENDED RELEASE ORAL 2 TIMES DAILY
Qty: 180 TABLET | Refills: 3 | Status: SHIPPED | OUTPATIENT
Start: 2020-01-07 | End: 2020-02-25

## 2020-01-07 RX ORDER — OXYCODONE HYDROCHLORIDE 10 MG/1
10 TABLET ORAL EVERY 6 HOURS PRN
Qty: 120 TABLET | Refills: 0 | Status: SHIPPED | OUTPATIENT
Start: 2020-01-07 | End: 2020-02-03

## 2020-01-07 ASSESSMENT — ANXIETY QUESTIONNAIRES
5. BEING SO RESTLESS THAT IT IS HARD TO SIT STILL: NOT AT ALL
6. BECOMING EASILY ANNOYED OR IRRITABLE: NOT AT ALL
GAD7 TOTAL SCORE: 0
1. FEELING NERVOUS, ANXIOUS, OR ON EDGE: NOT AT ALL
7. FEELING AFRAID AS IF SOMETHING AWFUL MIGHT HAPPEN: NOT AT ALL
3. WORRYING TOO MUCH ABOUT DIFFERENT THINGS: NOT AT ALL
2. NOT BEING ABLE TO STOP OR CONTROL WORRYING: NOT AT ALL
IF YOU CHECKED OFF ANY PROBLEMS ON THIS QUESTIONNAIRE, HOW DIFFICULT HAVE THESE PROBLEMS MADE IT FOR YOU TO DO YOUR WORK, TAKE CARE OF THINGS AT HOME, OR GET ALONG WITH OTHER PEOPLE: NOT DIFFICULT AT ALL

## 2020-01-07 ASSESSMENT — PAIN SCALES - GENERAL: PAINLEVEL: EXTREME PAIN (8)

## 2020-01-07 ASSESSMENT — ENCOUNTER SYMPTOMS
PARESTHESIAS: 1
ABDOMINAL PAIN: 0
FEVER: 0
NUMBNESS: 1
FATIGUE: 0

## 2020-01-07 ASSESSMENT — PATIENT HEALTH QUESTIONNAIRE - PHQ9: 5. POOR APPETITE OR OVEREATING: NOT AT ALL

## 2020-01-07 NOTE — PROGRESS NOTES
SUBJECTIVE:   Regulo Roth is a 59 year old male who presents to clinic today for the following health issues:    HPI  Follow up on triglycerides and pain.  Ongoing significant foot pain, keeping him awake at night. Had to leave [place] at relatives early due to pain.  Is losing weight now, fewer carbs.  On Lopid, had labs this AM.  Some GI side effects from it.     Last tox screen was 9/16/19, was as expected.    Patient Active Problem List    Diagnosis Date Noted     Hypertriglyceridemia 12/04/2019     Priority: Medium     Benign prostatic hyperplasia with urinary retention 06/27/2019     Priority: Medium     Epidural lipomatosis 08/04/2017     Priority: Medium     Chronic idiopathic axonal polyneuropathy 06/21/2017     Priority: Medium     Neuropathy 06/17/2016     Priority: Medium     Sleep apnea, obstructive 06/30/2015     Priority: Medium     Controlled substance agreement signed 6/27/19 09/04/2014     Priority: Medium     Overview:   Updated 6/3/2016       GERD (gastroesophageal reflux disease) 11/25/2013     Priority: Medium     Health care maintenance 11/25/2013     Priority: Medium     Vitamin D deficiency 11/15/2013     Priority: Medium     Paresthesia of bilateral legs 11/11/2013     Priority: Medium     Asthma 02/22/2012     Priority: Medium     Eye irritation 10/06/2011     Priority: Medium     Osteoarthritis, knee 06/22/2011     Priority: Medium     Plantar fascial fibromatosis 06/22/2011     Priority: Medium     Seborrheic dermatitis 06/22/2011     Priority: Medium     Past Surgical History:   Procedure Laterality Date     ARTHROPLASTY KNEE      August 2012,left - became infected require debridment     ARTHROSCOPY KNEE      1981,acl and medial meniscus repair [Other]     COLONOSCOPY  12/09/2013    ,F/U 2023     ESOPHAGOSCOPY, GASTROSCOPY, DUODENOSCOPY (EGD), COMBINED      12/9/13,EGD     FUSION CERVICAL ANTERIOR ONE LEVEL      c6-7     INSERT STIMULATOR AND LEADS INTERNAL DORSAL COLUMN   04/2018     OTHER SURGICAL HISTORY      ,HERNIA REPAIR     OTHER SURGICAL HISTORY      ,HERNIA REPAIR     OTHER SURGICAL HISTORY      2012,[id number],DEBRIDEMENT,Left,knee  infection following surgery     OTHER SURGICAL HISTORY      2012,27599.3,MD KNEE MANIPULATION     OTHER SURGICAL HISTORY      206904,MULTIPLE SLEEP LATENCY TEST WITHOUT CPAP,uses C PAP     TONSILLECTOMY, ADENOIDECTOMY, COMBINED      No Comments Provided     Social History     Tobacco Use     Smoking status: Former Smoker     Packs/day: 1.00     Years: 18.00     Pack years: 18.00     Smokeless tobacco: Never Used   Substance Use Topics     Alcohol use: Yes     Alcohol/week: 8.3 standard drinks     Comment: Alcoholic Drinks/day: occ     Current Outpatient Medications   Medication Sig Dispense Refill     albuterol (PROAIR HFA/PROVENTIL HFA/VENTOLIN HFA) 108 (90 BASE) MCG/ACT Inhaler Inhale 2 puffs into the lungs every 4 hours as needed       aspirin 81 MG tablet Take 81 mg by mouth daily       cholecalciferol (VITAMIN D-1000 MAX ST) 1000 UNITS TABS Take 1,000 Units by mouth daily       esomeprazole (NEXIUM) 40 MG DR capsule TAKE 1 CAPSULE BY MOUTH EVERY DAY BEFORE A MEAL 90 capsule 3     fluocinonide (LIDEX) 0.05 % solution        fluticasone (FLONASE) 50 MCG/ACT spray Spray 2 sprays into both nostrils daily       Lidocaine 0.5 % AERO Externally apply 1 spray topically 4 times daily as needed (neuropathy) 170 g 11     mometasone (ELOCON) 0.1 % ointment        order for DME CPAP, heated humidifier, mask, headgear, filters and tubing. For home use. Pressure:  8   Length of Need:       oxyCODONE IR (ROXICODONE) 10 MG tablet Take 1 tablet (10 mg) by mouth every 6 hours as needed for severe pain 120 tablet 0     VITAMIN E COMPLEX PO Take by mouth daily       No Known Allergies    Review of Systems   Constitutional: Negative for fatigue and fever.   Gastrointestinal: Negative for abdominal pain.   Neurological: Positive for numbness and  paresthesias.        OBJECTIVE:     /76   Pulse 60   Temp 98.6  F (37  C) (Tympanic)   Resp 16   Wt 132.1 kg (291 lb 3.2 oz)   SpO2 93%   BMI 41.49 kg/m    Body mass index is 41.49 kg/m .  Physical Exam  Constitutional:       Appearance: Normal appearance.   Abdominal:      General: Abdomen is flat. There is no distension.      Palpations: There is no mass.   Neurological:      General: No focal deficit present.      Mental Status: He is alert and oriented to person, place, and time.   Psychiatric:         Mood and Affect: Mood normal.         Behavior: Behavior normal.         Thought Content: Thought content normal.         Diagnostic Test Results:  Results for orders placed or performed in visit on 01/07/20 (from the past 24 hour(s))   Lipid Panel   Result Value Ref Range    Cholesterol 251 (H) <200 mg/dL    Triglycerides 326 (H) <150 mg/dL    HDL Cholesterol 40 23 - 92 mg/dL    LDL Cholesterol Calculated 146 (H) <100 mg/dL    Non HDL Cholesterol 211 (H) <130 mg/dL       ASSESSMENT/PLAN:       (E78.1) Hypertriglyceridemia  (primary encounter diagnosis)  Comment: improving  Plan: advised ongoing weight loss, consider stopping meds in 1 year and then checking if he continues to lose weight.      (G60.9) Chronic idiopathic axonal polyneuropathy  Comment:  stable. Will trial carbazepine next, but he has tried at least 6 + meds without great results so far.  Plan: oxyCODONE IR (ROXICODONE) 10 MG tablet                     Kaiden Chavez MD  [facility]

## 2020-01-07 NOTE — NURSING NOTE
"Coming in for a f/u on medication     Chief Complaint   Patient presents with     Recheck Medication     check up       Initial /76   Pulse 60   Temp 98.6  F (37  C) (Tympanic)   Resp 16   Wt 132.1 kg (291 lb 3.2 oz)   SpO2 93%   BMI 41.49 kg/m   Estimated body mass index is 41.49 kg/m  as calculated from the following:    Height as of 11/12/19: 1.784 m (5' 10.25\").    Weight as of this encounter: 132.1 kg (291 lb 3.2 oz).  Medication Reconciliation: complete    Jayashree Aguilar LPN  "

## 2020-01-08 ASSESSMENT — ANXIETY QUESTIONNAIRES: GAD7 TOTAL SCORE: 0

## 2020-01-16 ENCOUNTER — NURSE TRIAGE (OUTPATIENT)
Dept: FAMILY MEDICINE | Facility: OTHER | Age: 60
End: 2020-01-16

## 2020-01-16 NOTE — TELEPHONE ENCOUNTER
Can safely stop abruptly.  Discussed with Patti personally who will call pt edie.  Kaiden Chavez MD on 1/16/2020 at 5:09 PM

## 2020-01-16 NOTE — TELEPHONE ENCOUNTER
Per face to face with Dr. Chavez, Pt could try dropping down to 100 mg, 1 time per day or stop completely and ok to stop abruptly. Pt follows up monthly for pain medications.    Called and spoke to Patient after verifying last name and date of birth. Pt notified of Dr. Chavez's response/recommendations. Pt states he already decreased to once daily, yesterday and today. Pt verbalized plan to wean down, and if still miserable, stop medication all-together. Pt will plan to follow-up in 3 weeks, to discuss other options. Arianne Meneses RN .............. 1/16/2020  5:17 PM

## 2020-01-16 NOTE — TELEPHONE ENCOUNTER
S-(situation): Pt thinks that he is having a med reaction to Carbamazepine.     B-(background): Trial of Tegretol  mg BID ordered 1/7/20 OV for Chronic idiopathic axonal polyneuropathy. Has tried at least 6 + meds without great results so far.     A-(assessment): Taking Tegretol for 1 week. Last 2 nights, neuropathy in feet severe, rated 10+/10 at night, 7-8/10 during day. Unable to sleep, worse, the more he does, little relief with elevation of legs. Also takes Percocet, has DRG inplant, and uses medical cannabis. Pt wondering if Tegretol could be causing pain to increase in intensity, and if he should stop, how to wean off safely. Denies other symptoms out of norm.    R-(recommendations): Routing to Dr. Chavez, to review and advise.     PT REQUESTING CALL BACK AND WAITING BY PHONE.    Arianne Meneses RN .............. 1/16/2020  5:01 PM

## 2020-02-03 ENCOUNTER — OFFICE VISIT (OUTPATIENT)
Dept: FAMILY MEDICINE | Facility: OTHER | Age: 60
End: 2020-02-03
Attending: FAMILY MEDICINE
Payer: COMMERCIAL

## 2020-02-03 VITALS
RESPIRATION RATE: 20 BRPM | SYSTOLIC BLOOD PRESSURE: 120 MMHG | OXYGEN SATURATION: 95 % | TEMPERATURE: 96.6 F | DIASTOLIC BLOOD PRESSURE: 78 MMHG | HEART RATE: 73 BPM | BODY MASS INDEX: 42.4 KG/M2 | WEIGHT: 297.6 LBS

## 2020-02-03 DIAGNOSIS — G60.9 CHRONIC IDIOPATHIC AXONAL POLYNEUROPATHY: ICD-10-CM

## 2020-02-03 PROCEDURE — 99213 OFFICE O/P EST LOW 20 MIN: CPT | Performed by: FAMILY MEDICINE

## 2020-02-03 RX ORDER — OXYCODONE HYDROCHLORIDE 10 MG/1
10 TABLET ORAL EVERY 6 HOURS PRN
Qty: 120 TABLET | Refills: 0 | Status: SHIPPED | OUTPATIENT
Start: 2020-02-03 | End: 2020-02-25

## 2020-02-03 ASSESSMENT — ENCOUNTER SYMPTOMS
SLEEP DISTURBANCE: 1
PARESTHESIAS: 1
NUMBNESS: 1
DIAPHORESIS: 0

## 2020-02-03 ASSESSMENT — PAIN SCALES - GENERAL: PAINLEVEL: EXTREME PAIN (8)

## 2020-02-03 NOTE — PROGRESS NOTES
"  SUBJECTIVE:   Regulo Roth is a 59 year old male who presents to clinic today for the following health issues:    HPI    Follow up on lower extremity neuropathy.  Symptoms are roughly the same.  Trouble sleeping especially, can lay for only a few minutes, then sits in a chair.  Cannot have anything touching the skin, with sheets it \"feels like a blow torch\".  Will trigger even with a fan blowing in the room.  The carbamazepine trial was not good.  Tapered up, and triggered even more foot pain, has now stopped it.  Is on medical THC.    Last tox screen was 9/16/19, was as expected.    Patient Active Problem List    Diagnosis Date Noted     Hypertriglyceridemia 12/04/2019     Priority: Medium     Benign prostatic hyperplasia with urinary retention 06/27/2019     Priority: Medium     Epidural lipomatosis 08/04/2017     Priority: Medium     Chronic idiopathic axonal polyneuropathy 06/21/2017     Priority: Medium     Neuropathy 06/17/2016     Priority: Medium     Sleep apnea, obstructive 06/30/2015     Priority: Medium     Controlled substance agreement signed 6/27/19 09/04/2014     Priority: Medium     Overview:   Updated 6/3/2016       GERD (gastroesophageal reflux disease) 11/25/2013     Priority: Medium     Health care maintenance 11/25/2013     Priority: Medium     Vitamin D deficiency 11/15/2013     Priority: Medium     Paresthesia of bilateral legs 11/11/2013     Priority: Medium     Asthma 02/22/2012     Priority: Medium     Eye irritation 10/06/2011     Priority: Medium     Osteoarthritis, knee 06/22/2011     Priority: Medium     Plantar fascial fibromatosis 06/22/2011     Priority: Medium     Seborrheic dermatitis 06/22/2011     Priority: Medium     Past Surgical History:   Procedure Laterality Date     ARTHROPLASTY KNEE      August 2012,left - became infected require debridment     ARTHROSCOPY KNEE      1981,acl and medial meniscus repair [Other]     COLONOSCOPY  12/09/2013    ,F/U 2023     " ESOPHAGOSCOPY, GASTROSCOPY, DUODENOSCOPY (EGD), COMBINED      12/9/13,EGD     FUSION CERVICAL ANTERIOR ONE LEVEL      c6-7     INSERT STIMULATOR AND LEADS INTERNAL DORSAL COLUMN  04/2018     OTHER SURGICAL HISTORY      ,HERNIA REPAIR     OTHER SURGICAL HISTORY      ,HERNIA REPAIR     OTHER SURGICAL HISTORY      2012,[id number],DEBRIDEMENT,Left,knee  infection following surgery     OTHER SURGICAL HISTORY      2012,62621.3,[place] KNEE MANIPULATION     OTHER SURGICAL HISTORY      206904,MULTIPLE SLEEP LATENCY TEST WITHOUT CPAP,uses C PAP     TONSILLECTOMY, ADENOIDECTOMY, COMBINED      No Comments Provided     Social History     Tobacco Use     Smoking status: Former Smoker     Packs/day: 1.00     Years: 18.00     Pack years: 18.00     Smokeless tobacco: Never Used   Substance Use Topics     Alcohol use: Yes     Alcohol/week: 8.3 standard drinks     Comment: Alcoholic Drinks/day: occ     Current Outpatient Medications   Medication Sig Dispense Refill     albuterol (PROAIR HFA/PROVENTIL HFA/VENTOLIN HFA) 108 (90 BASE) MCG/ACT Inhaler Inhale 2 puffs into the lungs every 4 hours as needed       aspirin 81 MG tablet Take 81 mg by mouth daily       cholecalciferol (VITAMIN D-1000 MAX ST) 1000 UNITS TABS Take 1,000 Units by mouth daily       esomeprazole (NEXIUM) 40 MG DR capsule TAKE 1 CAPSULE BY MOUTH EVERY DAY BEFORE A MEAL 90 capsule 3     fluocinonide (LIDEX) 0.05 % solution        fluticasone (FLONASE) 50 MCG/ACT spray Spray 2 sprays into both nostrils daily       order for DME CPAP, heated humidifier, mask, headgear, filters and tubing. For home use. Pressure:  8   Length of Need:       oxyCODONE IR (ROXICODONE) 10 MG tablet Take 1 tablet (10 mg) by mouth every 6 hours as needed for severe pain 120 tablet 0     VITAMIN E COMPLEX PO Take by mouth daily       carBAMazepine (TEGRETOL XR) 100 MG 12 hr tablet Take 1 tablet (100 mg) by mouth 2 times daily (Patient not taking: Reported on 2/3/2020) 180 tablet 3      Lidocaine 0.5 % AERO Externally apply 1 spray topically 4 times daily as needed (neuropathy) (Patient not taking: Reported on 2/3/2020) 170 g 11     No Known Allergies    Review of Systems   Constitutional: Negative for diaphoresis.   Neurological: Positive for numbness and paresthesias.   Psychiatric/Behavioral: Positive for sleep disturbance.        OBJECTIVE:     /78   Pulse 73   Temp 96.6  F (35.9  C) (Tympanic)   Resp 20   Wt 135 kg (297 lb 9.6 oz)   SpO2 95%   BMI 42.40 kg/m    Body mass index is 42.4 kg/m .  Physical Exam  Constitutional:       Appearance: Normal appearance.   Neurological:      General: No focal deficit present.      Mental Status: He is alert and oriented to person, place, and time.   Psychiatric:         Mood and Affect: Mood normal.         Behavior: Behavior normal.         Thought Content: Thought content normal.             ASSESSMENT/PLAN:         (G60.9) Chronic idiopathic axonal polyneuropathy  Comment: very challenging diagnosis, and he has had many interventions.  Really is just adequate on the narcotics.  Refilled them for 4 weeks.   reviewed and stable  Plan:  Follow up 4 weeks.       Kaiden Chavez MD  [facility]

## 2020-02-03 NOTE — NURSING NOTE
"Chief Complaint   Patient presents with     Recheck Medication       Initial There were no vitals taken for this visit. Estimated body mass index is 41.49 kg/m  as calculated from the following:    Height as of 11/12/19: 1.784 m (5' 10.25\").    Weight as of 1/7/20: 132.1 kg (291 lb 3.2 oz).  Medication Reconciliation: complete    Aimee Machado LPN  "

## 2020-02-04 ASSESSMENT — ASTHMA QUESTIONNAIRES: ACT_TOTALSCORE: 25

## 2020-02-25 ENCOUNTER — OFFICE VISIT (OUTPATIENT)
Dept: FAMILY MEDICINE | Facility: OTHER | Age: 60
End: 2020-02-25
Attending: FAMILY MEDICINE
Payer: COMMERCIAL

## 2020-02-25 VITALS
BODY MASS INDEX: 41.83 KG/M2 | SYSTOLIC BLOOD PRESSURE: 132 MMHG | TEMPERATURE: 97.5 F | OXYGEN SATURATION: 94 % | DIASTOLIC BLOOD PRESSURE: 78 MMHG | WEIGHT: 293.6 LBS | RESPIRATION RATE: 16 BRPM | HEART RATE: 70 BPM

## 2020-02-25 DIAGNOSIS — J45.20 MILD INTERMITTENT ASTHMA WITHOUT COMPLICATION: ICD-10-CM

## 2020-02-25 DIAGNOSIS — G60.9 CHRONIC IDIOPATHIC AXONAL POLYNEUROPATHY: Primary | ICD-10-CM

## 2020-02-25 PROCEDURE — 99213 OFFICE O/P EST LOW 20 MIN: CPT | Performed by: FAMILY MEDICINE

## 2020-02-25 RX ORDER — ALBUTEROL SULFATE 90 UG/1
2 AEROSOL, METERED RESPIRATORY (INHALATION) EVERY 4 HOURS PRN
Qty: 1 INHALER | Refills: 4 | Status: SHIPPED | OUTPATIENT
Start: 2020-02-25 | End: 2023-04-14

## 2020-02-25 RX ORDER — OXYCODONE HYDROCHLORIDE 10 MG/1
10 TABLET ORAL EVERY 6 HOURS PRN
Qty: 120 TABLET | Refills: 0 | Status: SHIPPED | OUTPATIENT
Start: 2020-02-25 | End: 2020-03-16

## 2020-02-25 ASSESSMENT — PAIN SCALES - GENERAL: PAINLEVEL: EXTREME PAIN (8)

## 2020-02-25 ASSESSMENT — ENCOUNTER SYMPTOMS
NUMBNESS: 1
FATIGUE: 0
SLEEP DISTURBANCE: 1
PARESTHESIAS: 1

## 2020-02-25 NOTE — NURSING NOTE
"Chief Complaint   Patient presents with     Recheck Medication       Initial /78   Pulse 70   Temp 97.5  F (36.4  C) (Temporal)   Resp 16   Wt 133.2 kg (293 lb 9.6 oz)   SpO2 94%   BMI 41.83 kg/m   Estimated body mass index is 41.83 kg/m  as calculated from the following:    Height as of 11/12/19: 1.784 m (5' 10.25\").    Weight as of this encounter: 133.2 kg (293 lb 9.6 oz).  Medication Reconciliation: complete    Pearl Thomas, GLENN  "

## 2020-02-25 NOTE — PROGRESS NOTES
SUBJECTIVE:   Regulo Roth is a 59 year old male who presents to clinic today for the following health issues:    HPI  Follow up on chronic lower extremity pains and neuropathy.  It is perhaps getting worse. Up to 8/10 today.  Especially bad at night.  Uses medical THC too.  Some cramping pains now in addition to shooting type pains.  Has been adjusting his nerve stimulator, without much relief.  Last tox screen was as expected 9/16/19.     Patient Active Problem List    Diagnosis Date Noted     Hypertriglyceridemia 12/04/2019     Priority: Medium     Benign prostatic hyperplasia with urinary retention 06/27/2019     Priority: Medium     Epidural lipomatosis 08/04/2017     Priority: Medium     Chronic idiopathic axonal polyneuropathy 06/21/2017     Priority: Medium     Neuropathy 06/17/2016     Priority: Medium     Sleep apnea, obstructive 06/30/2015     Priority: Medium     Controlled substance agreement signed 6/27/19 09/04/2014     Priority: Medium     Overview:   Updated 6/3/2016       GERD (gastroesophageal reflux disease) 11/25/2013     Priority: Medium     Health care maintenance 11/25/2013     Priority: Medium     Vitamin D deficiency 11/15/2013     Priority: Medium     Paresthesia of bilateral legs 11/11/2013     Priority: Medium     Asthma 02/22/2012     Priority: Medium     Eye irritation 10/06/2011     Priority: Medium     Osteoarthritis, knee 06/22/2011     Priority: Medium     Plantar fascial fibromatosis 06/22/2011     Priority: Medium     Seborrheic dermatitis 06/22/2011     Priority: Medium     Past Surgical History:   Procedure Laterality Date     ARTHROPLASTY KNEE      August 2012,left - became infected require debridment     ARTHROSCOPY KNEE      1981,acl and medial meniscus repair [Other]     COLONOSCOPY  12/09/2013    ,F/U 2023     ESOPHAGOSCOPY, GASTROSCOPY, DUODENOSCOPY (EGD), COMBINED      12/9/13,EGD     FUSION CERVICAL ANTERIOR ONE LEVEL      c6-7     INSERT STIMULATOR AND LEADS  INTERNAL DORSAL COLUMN  04/2018     OTHER SURGICAL HISTORY      ,HERNIA REPAIR     OTHER SURGICAL HISTORY      ,HERNIA REPAIR     OTHER SURGICAL HISTORY      2012,[id number],DEBRIDEMENT,Left,knee  infection following surgery     OTHER SURGICAL HISTORY      2012,27599.3,[place] KNEE MANIPULATION     OTHER SURGICAL HISTORY      206904,MULTIPLE SLEEP LATENCY TEST WITHOUT CPAP,uses C PAP     TONSILLECTOMY, ADENOIDECTOMY, COMBINED      No Comments Provided     Social History     Tobacco Use     Smoking status: Former Smoker     Packs/day: 1.00     Years: 18.00     Pack years: 18.00     Smokeless tobacco: Never Used   Substance Use Topics     Alcohol use: Yes     Alcohol/week: 8.3 standard drinks     Comment: Alcoholic Drinks/day: occ     Current Outpatient Medications   Medication Sig Dispense Refill     aspirin 81 MG tablet Take 81 mg by mouth daily       cholecalciferol (VITAMIN D-1000 MAX ST) 1000 UNITS TABS Take 1,000 Units by mouth daily       esomeprazole (NEXIUM) 40 MG DR capsule TAKE 1 CAPSULE BY MOUTH EVERY DAY BEFORE A MEAL 90 capsule 3     fluticasone (FLONASE) 50 MCG/ACT spray Spray 2 sprays into both nostrils daily       order for DME CPAP, heated humidifier, mask, headgear, filters and tubing. For home use. Pressure:  8   Length of Need:       oxyCODONE IR (ROXICODONE) 10 MG tablet Take 1 tablet (10 mg) by mouth every 6 hours as needed for severe pain 120 tablet 0     VITAMIN E COMPLEX PO Take by mouth daily       albuterol (PROAIR HFA/PROVENTIL HFA/VENTOLIN HFA) 108 (90 BASE) MCG/ACT Inhaler Inhale 2 puffs into the lungs every 4 hours as needed       fluocinonide (LIDEX) 0.05 % solution        Lidocaine 0.5 % AERO Externally apply 1 spray topically 4 times daily as needed (neuropathy) (Patient not taking: Reported on 2/3/2020) 170 g 11     No Known Allergies    Review of Systems   Constitutional: Negative for fatigue.   Neurological: Positive for numbness and paresthesias.   Psychiatric/Behavioral:  Positive for sleep disturbance.        OBJECTIVE:     /78   Pulse 70   Temp 97.5  F (36.4  C) (Temporal)   Resp 16   Wt 133.2 kg (293 lb 9.6 oz)   SpO2 94%   BMI 41.83 kg/m    Body mass index is 41.83 kg/m .  Physical Exam  Constitutional:       Appearance: Normal appearance.   Neurological:      General: No focal deficit present.      Mental Status: He is alert and oriented to person, place, and time.   Psychiatric:         Mood and Affect: Mood normal.         Behavior: Behavior normal.         Diagnostic Test Results:  none     ASSESSMENT/PLAN:         (G60.9) Chronic idiopathic axonal polyneuropathy  Comment: this is very challenging, and seems to be progressing. Has had several neuro consults, EMG, labs, etc.  Remains idiopathic. Again I offered to arrange a neuro follow up at [place] or the , he will consider this.      Plan: oxyCODONE IR (ROXICODONE) 10 MG tablet         reviewed. Refilled for 4 weeks.          Kaiden Chavez MD  [facility]

## 2020-03-16 ENCOUNTER — OFFICE VISIT (OUTPATIENT)
Dept: FAMILY MEDICINE | Facility: OTHER | Age: 60
End: 2020-03-16
Attending: FAMILY MEDICINE
Payer: COMMERCIAL

## 2020-03-16 VITALS
HEIGHT: 70 IN | WEIGHT: 301.4 LBS | HEART RATE: 88 BPM | RESPIRATION RATE: 14 BRPM | TEMPERATURE: 97.4 F | DIASTOLIC BLOOD PRESSURE: 66 MMHG | BODY MASS INDEX: 43.15 KG/M2 | SYSTOLIC BLOOD PRESSURE: 142 MMHG

## 2020-03-16 DIAGNOSIS — G60.9 CHRONIC IDIOPATHIC AXONAL POLYNEUROPATHY: Primary | ICD-10-CM

## 2020-03-16 PROCEDURE — 99213 OFFICE O/P EST LOW 20 MIN: CPT | Performed by: FAMILY MEDICINE

## 2020-03-16 PROCEDURE — 80307 DRUG TEST PRSMV CHEM ANLYZR: CPT | Mod: ZL | Performed by: FAMILY MEDICINE

## 2020-03-16 RX ORDER — OXYCODONE HYDROCHLORIDE 10 MG/1
10 TABLET ORAL EVERY 6 HOURS PRN
Qty: 120 TABLET | Refills: 0 | Status: SHIPPED | OUTPATIENT
Start: 2020-03-16 | End: 2020-04-14

## 2020-03-16 ASSESSMENT — ENCOUNTER SYMPTOMS
FATIGUE: 0
NUMBNESS: 1
FEVER: 0
SLEEP DISTURBANCE: 1
PARESTHESIAS: 1
WEAKNESS: 0

## 2020-03-16 ASSESSMENT — PAIN SCALES - GENERAL: PAINLEVEL: EXTREME PAIN (8)

## 2020-03-16 ASSESSMENT — MIFFLIN-ST. JEOR: SCORE: 2193.39

## 2020-03-16 NOTE — NURSING NOTE
Patient presents to clinic for medication management.  Anais Lin LPN ....................  3/16/2020   10:22 AM

## 2020-03-16 NOTE — PROGRESS NOTES
SUBJECTIVE:   Regulo Roth is a 59 year old male who presents to clinic today for the following health issues:    HPI  Follow up on his pain meds.  Pain remains the same, in feet.  Burning type.  Severe.  [place] gained [facility] but still [facility] a little.  Last tox screen was 9/16/19 and was as expected. No abuse or misuse.      Patient Active Problem List    Diagnosis Date Noted     Hypertriglyceridemia 12/04/2019     Priority: Medium     Benign prostatic hyperplasia with urinary retention 06/27/2019     Priority: Medium     Epidural lipomatosis 08/04/2017     Priority: Medium     Chronic idiopathic axonal polyneuropathy 06/21/2017     Priority: Medium     Neuropathy 06/17/2016     Priority: Medium     Sleep apnea, obstructive 06/30/2015     Priority: Medium     Controlled substance agreement signed 6/27/19 09/04/2014     Priority: Medium     Overview:   Updated 6/3/2016       GERD (gastroesophageal reflux disease) 11/25/2013     Priority: Medium     Health care maintenance 11/25/2013     Priority: Medium     Vitamin D deficiency 11/15/2013     Priority: Medium     Paresthesia of bilateral legs 11/11/2013     Priority: Medium     Mild intermittent asthma without complication 02/22/2012     Priority: Medium     Eye irritation 10/06/2011     Priority: Medium     Osteoarthritis, knee 06/22/2011     Priority: Medium     Plantar fascial fibromatosis 06/22/2011     Priority: Medium     Seborrheic dermatitis 06/22/2011     Priority: Medium     Past Surgical History:   Procedure Laterality Date     ARTHROPLASTY KNEE      August 2012,left - became infected require debridment     ARTHROSCOPY KNEE      1981,acl and medial meniscus repair [Other]     COLONOSCOPY  12/09/2013    ,F/U 2023     ESOPHAGOSCOPY, GASTROSCOPY, DUODENOSCOPY (EGD), COMBINED      12/9/13,EGD     FUSION CERVICAL ANTERIOR ONE LEVEL      c6-7     INSERT STIMULATOR AND LEADS INTERNAL DORSAL COLUMN  04/2018     OTHER  SURGICAL HISTORY      ,HERNIA REPAIR     OTHER SURGICAL HISTORY      ,HERNIA REPAIR     OTHER SURGICAL HISTORY      2012,[id number],DEBRIDEMENT,Left,knee  infection following surgery     OTHER SURGICAL HISTORY      2012,27599.3,[place] KNEE MANIPULATION     OTHER SURGICAL HISTORY      206904,MULTIPLE SLEEP LATENCY TEST WITHOUT CPAP,uses C PAP     TONSILLECTOMY, ADENOIDECTOMY, COMBINED      No Comments Provided     Social History     Tobacco Use     Smoking status: Former Smoker     Packs/day: 1.00     Years: 18.00     Pack years: 18.00     Smokeless tobacco: Never Used   Substance Use Topics     Alcohol use: Yes     Alcohol/week: 8.3 standard drinks     Comment: Alcoholic Drinks/day: occ     Current Outpatient Medications   Medication Sig Dispense Refill     albuterol (PROAIR HFA/PROVENTIL HFA/VENTOLIN HFA) 108 (90 Base) MCG/ACT inhaler Inhale 2 puffs into the lungs every 4 hours as needed for wheezing 1 Inhaler 4     aspirin 81 MG tablet Take 81 mg by mouth daily       cholecalciferol (VITAMIN D-1000 MAX ST) 1000 UNITS TABS Take 1,000 Units by mouth daily       esomeprazole (NEXIUM) 40 MG DR capsule TAKE 1 CAPSULE BY MOUTH EVERY DAY BEFORE A MEAL 90 capsule 3     fluocinonide (LIDEX) 0.05 % solution        fluticasone (FLONASE) 50 MCG/ACT spray Spray 2 sprays into both nostrils daily       Lidocaine 0.5 % AERO Externally apply 1 spray topically 4 times daily as needed (neuropathy) 170 g 11     order for DME CPAP, heated humidifier, mask, headgear, filters and tubing. For home use. Pressure:  8   Length of Need:       oxyCODONE IR (ROXICODONE) 10 MG tablet Take 1 tablet (10 mg) by mouth every 6 hours as needed for severe pain 120 tablet 0     VITAMIN E COMPLEX PO Take by mouth daily       No Known Allergies    Review of Systems   Constitutional: Negative for fatigue and fever.   Neurological: Positive for numbness and paresthesias. Negative for weakness.   Psychiatric/Behavioral: Positive for sleep disturbance.     "    OBJECTIVE:     BP (!) 142/66   Pulse 88   Temp 97.4  F (36.3  C)   Resp 14   Ht 1.778 m (5' 10\")   Wt 136.7 kg (301 lb 6.4 oz)   BMI 43.25 kg/m    Body mass index is 43.25 kg/m .  Physical Exam  Constitutional:       Appearance: Normal appearance.   Neurological:      General: No focal deficit present.      Mental Status: He is alert and oriented to person, place, and time.   Psychiatric:         Mood and Affect: Mood normal.         Behavior: Behavior normal.         Thought Content: Thought content normal.             ASSESSMENT/PLAN:         (G60.9) Chronic idiopathic axonal polyneuropathy  (primary encounter diagnosis)  Comment: discussed with him even further alternative treatment, such as meditation, he had tried this, for a half hour once and found it difficult.    Plan: oxyCODONE IR (ROXICODONE) 10 MG tablet, Drug          Screen Comprehensive , Urine with Reported Meds        ([facility]) (Pain Care Package)                 Kaiden Chavez MD  [facility]    "

## 2020-03-20 LAB — PAIN DRUG SCR UR W RPTD MEDS: NORMAL

## 2020-04-14 ENCOUNTER — VIRTUAL VISIT (OUTPATIENT)
Dept: FAMILY MEDICINE | Facility: OTHER | Age: 60
End: 2020-04-14
Attending: FAMILY MEDICINE
Payer: COMMERCIAL

## 2020-04-14 DIAGNOSIS — K21.9 GASTROESOPHAGEAL REFLUX DISEASE, ESOPHAGITIS PRESENCE NOT SPECIFIED: Primary | ICD-10-CM

## 2020-04-14 DIAGNOSIS — G60.9 CHRONIC IDIOPATHIC AXONAL POLYNEUROPATHY: ICD-10-CM

## 2020-04-14 PROCEDURE — 99212 OFFICE O/P EST SF 10 MIN: CPT | Mod: 95 | Performed by: FAMILY MEDICINE

## 2020-04-14 RX ORDER — PANTOPRAZOLE SODIUM 40 MG/1
40 TABLET, DELAYED RELEASE ORAL DAILY
Qty: 90 TABLET | Refills: 3 | Status: SHIPPED | OUTPATIENT
Start: 2020-04-14 | End: 2020-05-19

## 2020-04-14 RX ORDER — OXYCODONE HYDROCHLORIDE 10 MG/1
10 TABLET ORAL EVERY 4 HOURS PRN
Qty: 150 TABLET | Refills: 0 | Status: SHIPPED | OUTPATIENT
Start: 2020-04-14 | End: 2020-05-19

## 2020-04-14 ASSESSMENT — ANXIETY QUESTIONNAIRES
3. WORRYING TOO MUCH ABOUT DIFFERENT THINGS: NOT AT ALL
IF YOU CHECKED OFF ANY PROBLEMS ON THIS QUESTIONNAIRE, HOW DIFFICULT HAVE THESE PROBLEMS MADE IT FOR YOU TO DO YOUR WORK, TAKE CARE OF THINGS AT HOME, OR GET ALONG WITH OTHER PEOPLE: NOT DIFFICULT AT ALL
2. NOT BEING ABLE TO STOP OR CONTROL WORRYING: NOT AT ALL
7. FEELING AFRAID AS IF SOMETHING AWFUL MIGHT HAPPEN: NOT AT ALL
GAD7 TOTAL SCORE: 0
1. FEELING NERVOUS, ANXIOUS, OR ON EDGE: NOT AT ALL
5. BEING SO RESTLESS THAT IT IS HARD TO SIT STILL: NOT AT ALL
6. BECOMING EASILY ANNOYED OR IRRITABLE: NOT AT ALL

## 2020-04-14 ASSESSMENT — PAIN SCALES - GENERAL: PAINLEVEL: WORST PAIN (10)

## 2020-04-14 ASSESSMENT — PATIENT HEALTH QUESTIONNAIRE - PHQ9: 5. POOR APPETITE OR OVEREATING: NOT AT ALL

## 2020-04-14 NOTE — NURSING NOTE
With a medication refill, has tried the generic form of acid reflux and the OTC, NEED TO TALK ABOUT THIS    Jayashree Aguilar LPN on 4/14/2020 at 12:50 PM

## 2020-04-14 NOTE — PROGRESS NOTES
"Regulo Roth is a 59 year old male who is being evaluated via a billable telephone visit.      The patient has been notified of following:     \"This telephone visit will be conducted via a call between you and your physician/provider. We have found that certain health care needs can be provided without the need for a physical exam.  This service lets us provide the care you need with a short phone conversation.  If a prescription is necessary we can send it directly to your pharmacy.  If lab work is needed we can place an order for that and you can then stop by our lab to have the test done at a later time.    Telephone visits are billed at different rates depending on your insurance coverage. During this emergency period, for some insurers they may be billed the same as an in-person visit.  Please reach out to your insurance provider with any questions.    If during the course of the call the physician/provider feels a telephone visit is not appropriate, you will not be charged for this service.\"    Patient has given verbal consent for Telephone visit?  Yes    How would you like to obtain your AVS? no    Subjective     Regulo Roth is a 59 year old male who presents to clinic today for the following health issues:      Follow up on neuropathy.  He has been leaving the house about once a week for groceries.  Feels the symptoms are getting worse, especially at night.  He has a neural stimulator, and feels this helps a bit with the stabbing pains, getting lots of cramping now in toes.  The severe nights are now happening at least weekly.  He has failed lyrica, neurontin, cymbalta, amitriptyline, Carbamezapine all without relief or significant side effects.    Last tox screen was as expected on 3/16/20.      He has been trying to get Nexium, is told it is not covered by insurance.  Has OTC dosing and symptoms return at 20 milligram.              Current Outpatient Medications   Medication Sig Dispense " Refill     albuterol (PROAIR HFA/PROVENTIL HFA/VENTOLIN HFA) 108 (90 Base) MCG/ACT inhaler Inhale 2 puffs into the lungs every 4 hours as needed for wheezing 1 Inhaler 4     aspirin 81 MG tablet Take 81 mg by mouth daily       cholecalciferol (VITAMIN D-1000 MAX ST) 1000 UNITS TABS Take 1,000 Units by mouth daily       esomeprazole (NEXIUM) 40 MG DR capsule TAKE 1 CAPSULE BY MOUTH EVERY DAY BEFORE A MEAL 90 capsule 3     fluocinonide (LIDEX) 0.05 % solution        fluticasone (FLONASE) 50 MCG/ACT spray Spray 2 sprays into both nostrils daily       Lidocaine 0.5 % AERO Externally apply 1 spray topically 4 times daily as needed (neuropathy) 170 g 11     order for DME CPAP, heated humidifier, mask, headgear, filters and tubing. For home use. Pressure:  8   Length of Need:       oxyCODONE IR (ROXICODONE) 10 MG tablet Take 1 tablet (10 mg) by mouth every 6 hours as needed for severe pain 120 tablet 0     VITAMIN E COMPLEX PO Take by mouth daily       No Known Allergies    Reviewed and updated as needed this visit by Provider         Review of Systems   ROS COMP:     healthy, alert and no distress  Objective   Reported vitals:  There were no vitals taken for this visit.   healthy, alert and no distress  PSYCH: Alert and oriented times 3; coherent speech, normal   rate and volume, able to articulate logical thoughts, able   to abstract reason, no tangential thoughts, no hallucinations   or delusions  His affect is normal  RESP: No cough, no audible wheezing, able to talk in full sentences  Remainder of exam unable to be completed due to telephone visits    Diagnostic Test Results:  Labs reviewed in Epic        Assessment/Plan:  1. Gastroesophageal reflux disease, esophagitis presence not specified  Chronic, changed this from nexium to protonix.    - pantoprazole (PROTONIX) 40 MG EC tablet; Take 1 tablet (40 mg) by mouth daily  Dispense: 90 tablet; Refill: 3    2. Chronic idiopathic axonal polyneuropathy  Will increase dose  to 5 tabs total daily and follow up in 4 weeks.  - oxyCODONE IR (ROXICODONE) 10 MG tablet; Take 1 tablet (10 mg) by mouth every 4 hours as needed for severe pain Max of 5 daily  Dispense: 150 tablet; Refill: 0    No follow-ups on file.      Phone call duration:  9 minutes    Kaiden Chavez MD

## 2020-04-15 ASSESSMENT — ANXIETY QUESTIONNAIRES: GAD7 TOTAL SCORE: 0

## 2020-05-19 ENCOUNTER — VIRTUAL VISIT (OUTPATIENT)
Dept: FAMILY MEDICINE | Facility: OTHER | Age: 60
End: 2020-05-19
Attending: FAMILY MEDICINE
Payer: COMMERCIAL

## 2020-05-19 DIAGNOSIS — G60.9 CHRONIC IDIOPATHIC AXONAL POLYNEUROPATHY: ICD-10-CM

## 2020-05-19 DIAGNOSIS — K21.9 GASTROESOPHAGEAL REFLUX DISEASE, ESOPHAGITIS PRESENCE NOT SPECIFIED: Primary | ICD-10-CM

## 2020-05-19 PROCEDURE — 99213 OFFICE O/P EST LOW 20 MIN: CPT | Mod: 95 | Performed by: FAMILY MEDICINE

## 2020-05-19 RX ORDER — OXYCODONE HYDROCHLORIDE 10 MG/1
10 TABLET ORAL EVERY 4 HOURS PRN
Qty: 150 TABLET | Refills: 0 | Status: SHIPPED | OUTPATIENT
Start: 2020-05-19 | End: 2020-08-20

## 2020-05-19 RX ORDER — ESOMEPRAZOLE MAGNESIUM 40 MG/1
40 CAPSULE, DELAYED RELEASE ORAL
Qty: 90 CAPSULE | Refills: 3 | Status: SHIPPED | OUTPATIENT
Start: 2020-05-19 | End: 2020-05-19 | Stop reason: ALTCHOICE

## 2020-05-19 ASSESSMENT — ANXIETY QUESTIONNAIRES
3. WORRYING TOO MUCH ABOUT DIFFERENT THINGS: NOT AT ALL
2. NOT BEING ABLE TO STOP OR CONTROL WORRYING: NOT AT ALL
7. FEELING AFRAID AS IF SOMETHING AWFUL MIGHT HAPPEN: NOT AT ALL
6. BECOMING EASILY ANNOYED OR IRRITABLE: NOT AT ALL
1. FEELING NERVOUS, ANXIOUS, OR ON EDGE: NOT AT ALL
IF YOU CHECKED OFF ANY PROBLEMS ON THIS QUESTIONNAIRE, HOW DIFFICULT HAVE THESE PROBLEMS MADE IT FOR YOU TO DO YOUR WORK, TAKE CARE OF THINGS AT HOME, OR GET ALONG WITH OTHER PEOPLE: NOT DIFFICULT AT ALL
GAD7 TOTAL SCORE: 0
5. BEING SO RESTLESS THAT IT IS HARD TO SIT STILL: NOT AT ALL

## 2020-05-19 ASSESSMENT — PAIN SCALES - GENERAL: PAINLEVEL: EXTREME PAIN (8)

## 2020-05-19 ASSESSMENT — PATIENT HEALTH QUESTIONNAIRE - PHQ9: 5. POOR APPETITE OR OVEREATING: NOT AT ALL

## 2020-05-19 NOTE — PROGRESS NOTES
"Regulo Roth is a 59 year old male who is being evaluated via a billable telephone visit.      The patient has been notified of following:     \"This telephone visit will be conducted via a call between you and your physician/provider. We have found that certain health care needs can be provided without the need for a physical exam.  This service lets us provide the care you need with a short phone conversation.  If a prescription is necessary we can send it directly to your pharmacy.  If lab work is needed we can place an order for that and you can then stop by our lab to have the test done at a later time.    Telephone visits are billed at different rates depending on your insurance coverage. During this emergency period, for some insurers they may be billed the same as an in-person visit.  Please reach out to your insurance provider with any questions.    If during the course of the call the physician/provider feels a telephone visit is not appropriate, you will not be charged for this service.\"    Patient has given verbal consent for Telephone visit?  Yes    What phone number would you like to be contacted at? [phone]    How would you like to obtain your AVS?     Subjective     Regulo Roth is a 59 year old male who presents via phone visit today for the following health issues:    HPI  Medication Followup of PAIN MEDICATION    Taking Medication as prescribed: yes    Side Effects:  None    Medication Helping Symptoms:  yes      he is due for a refill on his narcotics.  Last tox screen was as expected on 3/16/20.  Is on medical THC.  Symptoms are unchanged.  Still sleeping poorly, lots of burning in the feet.      Lots of gerd symptoms with protonix.  Using TUMS three times a day now.  Was on many different ones. Prevacid, nexium, prilosec and had the best relief with the Nexium.  Has had symptoms since high school. Has no dysphagia at all.  Last EGD was 2013.  He believes it was normal.       "     Current Outpatient Medications   Medication Sig Dispense Refill     albuterol (PROAIR HFA/PROVENTIL HFA/VENTOLIN HFA) 108 (90 Base) MCG/ACT inhaler Inhale 2 puffs into the lungs every 4 hours as needed for wheezing 1 Inhaler 4     aspirin 81 MG tablet Take 81 mg by mouth daily       cholecalciferol (VITAMIN D-1000 MAX ST) 1000 UNITS TABS Take 1,000 Units by mouth daily       esomeprazole (NEXIUM) 40 MG DR capsule Take 1 capsule (40 mg) by mouth every morning (before breakfast) Take 30-60 minutes before eating. 90 capsule 3     fluocinonide (LIDEX) 0.05 % solution        fluticasone (FLONASE) 50 MCG/ACT spray Spray 2 sprays into both nostrils daily       Lidocaine 0.5 % AERO Externally apply 1 spray topically 4 times daily as needed (neuropathy) 170 g 11     order for DME CPAP, heated humidifier, mask, headgear, filters and tubing. For home use. Pressure:  8   Length of Need:       oxyCODONE IR (ROXICODONE) 10 MG tablet Take 1 tablet (10 mg) by mouth every 4 hours as needed for severe pain Max of 5 daily 150 tablet 0     oxyCODONE IR (ROXICODONE) 10 MG tablet Take 1 tablet (10 mg) by mouth every 4 hours as needed for severe pain Max 5 daily, to fill on or after 5/1820 150 tablet 0     oxyCODONE IR (ROXICODONE) 10 MG tablet Take 1 tablet (10 mg) by mouth every 4 hours as needed for severe pain Max 5 daily. Fill on/after 7/17/20 150 tablet 0     VITAMIN E COMPLEX PO Take by mouth daily       No Known Allergies    Reviewed and updated as needed this visit by Provider         Review of Systems   Constitutional, HEENT, cardiovascular, pulmonary, gi and gu systems are negative, except as otherwise noted.       Objective   Reported vitals:  There were no vitals taken for this visit.   healthy, alert and no distress  PSYCH: Alert and oriented times 3; coherent speech, normal   rate and volume, able to articulate logical thoughts, able   to abstract reason, no tangential thoughts, no hallucinations   or delusions  His  affect is normal  RESP: No cough, no audible wheezing, able to talk in full sentences  Remainder of exam unable to be completed due to telephone visits    Diagnostic Test Results:  Labs reviewed in Epic        Assessment/Plan:  1. Gastroesophageal reflux disease, esophagitis presence not specified  Is due for another EGD.  Can be delayed after COVID.  - esomeprazole (NEXIUM) 40 MG DR capsule; Take 1 capsule (40 mg) by mouth every morning (before breakfast) Take 30-60 minutes before eating.  Dispense: 90 capsule; Refill: 3    2. Chronic idiopathic axonal polyneuropathy  Stable.  Refilled for 3 months.    - oxyCODONE IR (ROXICODONE) 10 MG tablet; Take 1 tablet (10 mg) by mouth every 4 hours as needed for severe pain Max of 5 daily  Dispense: 150 tablet; Refill: 0  - oxyCODONE IR (ROXICODONE) 10 MG tablet; Take 1 tablet (10 mg) by mouth every 4 hours as needed for severe pain Max 5 daily, to fill on or after 5/1820  Dispense: 150 tablet; Refill: 0  - oxyCODONE IR (ROXICODONE) 10 MG tablet; Take 1 tablet (10 mg) by mouth every 4 hours as needed for severe pain Max 5 daily. Fill on/after 7/17/20  Dispense: 150 tablet; Refill: 0    Return in about 3 months (around 8/19/2020).      Phone call duration:  12 minutes    Kaiden Chavez MD

## 2020-05-20 ASSESSMENT — ANXIETY QUESTIONNAIRES: GAD7 TOTAL SCORE: 0

## 2020-08-20 ENCOUNTER — VIRTUAL VISIT (OUTPATIENT)
Dept: FAMILY MEDICINE | Facility: OTHER | Age: 60
End: 2020-08-20
Attending: FAMILY MEDICINE
Payer: COMMERCIAL

## 2020-08-20 VITALS — WEIGHT: 301 LBS | BODY MASS INDEX: 43.19 KG/M2

## 2020-08-20 DIAGNOSIS — G60.9 CHRONIC IDIOPATHIC AXONAL POLYNEUROPATHY: Primary | ICD-10-CM

## 2020-08-20 PROCEDURE — 99212 OFFICE O/P EST SF 10 MIN: CPT | Mod: 95 | Performed by: FAMILY MEDICINE

## 2020-08-20 RX ORDER — OXYCODONE HYDROCHLORIDE 10 MG/1
10 TABLET ORAL EVERY 4 HOURS PRN
Qty: 150 TABLET | Refills: 0 | Status: SHIPPED | OUTPATIENT
Start: 2020-08-20 | End: 2020-11-18

## 2020-08-20 RX ORDER — OXYCODONE HYDROCHLORIDE 10 MG/1
10 TABLET ORAL EVERY 4 HOURS PRN
Qty: 150 TABLET | Refills: 0 | Status: SHIPPED | OUTPATIENT
Start: 2020-08-20 | End: 2020-12-16

## 2020-08-20 ASSESSMENT — ANXIETY QUESTIONNAIRES
6. BECOMING EASILY ANNOYED OR IRRITABLE: NOT AT ALL
3. WORRYING TOO MUCH ABOUT DIFFERENT THINGS: NOT AT ALL
5. BEING SO RESTLESS THAT IT IS HARD TO SIT STILL: NOT AT ALL
1. FEELING NERVOUS, ANXIOUS, OR ON EDGE: NOT AT ALL
7. FEELING AFRAID AS IF SOMETHING AWFUL MIGHT HAPPEN: NOT AT ALL
GAD7 TOTAL SCORE: 0
2. NOT BEING ABLE TO STOP OR CONTROL WORRYING: NOT AT ALL
IF YOU CHECKED OFF ANY PROBLEMS ON THIS QUESTIONNAIRE, HOW DIFFICULT HAVE THESE PROBLEMS MADE IT FOR YOU TO DO YOUR WORK, TAKE CARE OF THINGS AT HOME, OR GET ALONG WITH OTHER PEOPLE: NOT DIFFICULT AT ALL

## 2020-08-20 ASSESSMENT — PAIN SCALES - GENERAL: PAINLEVEL: SEVERE PAIN (7)

## 2020-08-20 ASSESSMENT — PATIENT HEALTH QUESTIONNAIRE - PHQ9: 5. POOR APPETITE OR OVEREATING: NOT AT ALL

## 2020-08-20 NOTE — PROGRESS NOTES
"Regulo Roth is a 60 year old male who is being evaluated via a billable telephone visit.      The patient has been notified of following:     \"This telephone visit will be conducted via a call between you and your physician/provider. We have found that certain health care needs can be provided without the need for a physical exam.  This service lets us provide the care you need with a short phone conversation.  If a prescription is necessary we can send it directly to your pharmacy.  If lab work is needed we can place an order for that and you can then stop by our lab to have the test done at a later time.    Telephone visits are billed at different rates depending on your insurance coverage. During this emergency period, for some insurers they may be billed the same as an in-person visit.  Please reach out to your insurance provider with any questions.    If during the course of the call the physician/provider feels a telephone visit is not appropriate, you will not be charged for this service.\"    Patient has given verbal consent for Telephone visit?  Yes    What phone number would you like to be contacted at? [phone]    How would you like to obtain your AVS? Mail a copy    Subjective     Regulo Roth is a 60 year old male who presents via phone visit today for the following health issues:    HPI    Calling with medication request.  He had his implant reprogrammed last week.  Is helping slightly with fewer \"stingers\".  It seems to ramp up after supper and then is bad over night.  meds are adequate but not great.  Can tell when the prior dose is wearing off in 4-5 hours.  No significant side effects.  Has medical THC. Uses this at night mostly and helps him with sleep to a moderate degree.      Last tox screen was as expected on 3/16/20           Review of Systems           Objective          Vitals:  No vitals were obtained today due to virtual visit.    healthy, alert and no distress  PSYCH: " "Alert and oriented times 3; coherent speech, normal   rate and volume, able to articulate logical thoughts, able   to abstract reason, no tangential thoughts, no hallucinations   or delusions  His affect is normal  RESP: No cough, no audible wheezing, able to talk in full sentences  Remainder of exam unable to be completed due to telephone visits            Assessment/Plan:    Assessment & Plan     (G60.9) Chronic idiopathic axonal polyneuropathy  (primary encounter diagnosis)  Comment: this has been worked up extensively, including pain management clinics and neurology.  Pt has explored many different approaches and very engaged in his care.  Despite this, limited symptom relief overall.  Refilled the meds for 3 more months, follow up in person with a tox screen in 3 months.    Plan: oxyCODONE IR (ROXICODONE) 10 MG tablet,         oxyCODONE IR (ROXICODONE) 10 MG tablet,         oxyCODONE IR (ROXICODONE) 10 MG tablet,         naloxone (NARCAN) 4 MG/0.1ML nasal spray          reviewed and stable.         BMI:   Estimated body mass index is 43.19 kg/m  as calculated from the following:    Height as of 3/16/20: 1.778 m (5' 10\").    Weight as of this encounter: 136.5 kg (301 lb).   Weight management plan: Discussed healthy diet and exercise guidelines        See Patient Instructions    No follow-ups on file.    Kaiden Chavez MD  [facility]    Phone call duration:  13 minutes                "

## 2020-08-21 ASSESSMENT — ANXIETY QUESTIONNAIRES: GAD7 TOTAL SCORE: 0

## 2020-08-27 ENCOUNTER — TELEPHONE (OUTPATIENT)
Dept: FAMILY MEDICINE | Facility: OTHER | Age: 60
End: 2020-08-27

## 2020-08-27 NOTE — TELEPHONE ENCOUNTER
He called the insurance company and the pharmacy and they said this is all good to stay on the omeprazole 40mg     Jayashree Aguilar LPN on 8/27/2020 at 1:01 PM

## 2020-09-20 ENCOUNTER — MYC MEDICAL ADVICE (OUTPATIENT)
Dept: FAMILY MEDICINE | Facility: OTHER | Age: 60
End: 2020-09-20

## 2020-10-08 ENCOUNTER — ALLIED HEALTH/NURSE VISIT (OUTPATIENT)
Dept: FAMILY MEDICINE | Facility: OTHER | Age: 60
End: 2020-10-08
Payer: COMMERCIAL

## 2020-10-08 DIAGNOSIS — Z23 NEED FOR PROPHYLACTIC VACCINATION AND INOCULATION AGAINST INFLUENZA: Primary | ICD-10-CM

## 2020-10-08 PROCEDURE — 90686 IIV4 VACC NO PRSV 0.5 ML IM: CPT

## 2020-10-08 PROCEDURE — 90471 IMMUNIZATION ADMIN: CPT

## 2020-11-17 ENCOUNTER — TELEPHONE (OUTPATIENT)
Dept: FAMILY MEDICINE | Facility: OTHER | Age: 60
End: 2020-11-17

## 2020-11-17 NOTE — TELEPHONE ENCOUNTER
Scheduled telephone visit for tomorrow.  Anais Lin LPN ....................  11/17/2020   3:32 PM

## 2020-11-17 NOTE — TELEPHONE ENCOUNTER
Would like to get a refill of oxycodone  Need a phone visit or can he just get it refilled   Just noticed he was out of refills  Jayashree Aguilar LPN on 11/17/2020 at 2:25 PM

## 2020-11-17 NOTE — TELEPHONE ENCOUNTER
Patient would like a refill on his med.    Please call patient and let him know if this is possible.  Thank you Delia Amaya on 11/17/2020 at 10:16 AM

## 2020-11-18 ENCOUNTER — VIRTUAL VISIT (OUTPATIENT)
Dept: FAMILY MEDICINE | Facility: OTHER | Age: 60
End: 2020-11-18
Attending: FAMILY MEDICINE
Payer: COMMERCIAL

## 2020-11-18 DIAGNOSIS — G60.9 CHRONIC IDIOPATHIC AXONAL POLYNEUROPATHY: ICD-10-CM

## 2020-11-18 PROCEDURE — 99212 OFFICE O/P EST SF 10 MIN: CPT | Mod: 95 | Performed by: FAMILY MEDICINE

## 2020-11-18 RX ORDER — CARBAMAZEPINE 100 MG/1
TABLET, EXTENDED RELEASE ORAL
COMMUNITY
Start: 2020-01-07 | End: 2021-01-15

## 2020-11-18 RX ORDER — GEMFIBROZIL 600 MG/1
TABLET, FILM COATED ORAL
COMMUNITY
Start: 2020-10-19 | End: 2021-06-10

## 2020-11-18 RX ORDER — PANTOPRAZOLE SODIUM 40 MG/1
40 TABLET, DELAYED RELEASE ORAL DAILY
COMMUNITY
Start: 2020-04-14 | End: 2021-08-10

## 2020-11-18 RX ORDER — OXYCODONE HYDROCHLORIDE 10 MG/1
10 TABLET ORAL EVERY 4 HOURS PRN
Qty: 150 TABLET | Refills: 0 | Status: SHIPPED | OUTPATIENT
Start: 2020-11-18 | End: 2020-12-16

## 2020-11-18 ASSESSMENT — ANXIETY QUESTIONNAIRES
5. BEING SO RESTLESS THAT IT IS HARD TO SIT STILL: NOT AT ALL
IF YOU CHECKED OFF ANY PROBLEMS ON THIS QUESTIONNAIRE, HOW DIFFICULT HAVE THESE PROBLEMS MADE IT FOR YOU TO DO YOUR WORK, TAKE CARE OF THINGS AT HOME, OR GET ALONG WITH OTHER PEOPLE: NOT DIFFICULT AT ALL
1. FEELING NERVOUS, ANXIOUS, OR ON EDGE: NOT AT ALL
7. FEELING AFRAID AS IF SOMETHING AWFUL MIGHT HAPPEN: NOT AT ALL
2. NOT BEING ABLE TO STOP OR CONTROL WORRYING: NOT AT ALL
6. BECOMING EASILY ANNOYED OR IRRITABLE: NOT AT ALL
GAD7 TOTAL SCORE: 0
3. WORRYING TOO MUCH ABOUT DIFFERENT THINGS: NOT AT ALL

## 2020-11-18 ASSESSMENT — ASTHMA QUESTIONNAIRES
QUESTION_1 LAST FOUR WEEKS HOW MUCH OF THE TIME DID YOUR ASTHMA KEEP YOU FROM GETTING AS MUCH DONE AT WORK, SCHOOL OR AT HOME: NONE OF THE TIME
QUESTION_5 LAST FOUR WEEKS HOW WOULD YOU RATE YOUR ASTHMA CONTROL: WELL CONTROLLED

## 2020-11-18 ASSESSMENT — PATIENT HEALTH QUESTIONNAIRE - PHQ9: 5. POOR APPETITE OR OVEREATING: NOT AT ALL

## 2020-11-18 NOTE — PROGRESS NOTES
"Regulo Roth is a 60 year old male who is being evaluated via a billable telephone visit.      The patient has been notified of following:     \"This telephone visit will be conducted via a call between you and your physician/provider. We have found that certain health care needs can be provided without the need for a physical exam.  This service lets us provide the care you need with a short phone conversation.  If a prescription is necessary we can send it directly to your pharmacy.  If lab work is needed we can place an order for that and you can then stop by our lab to have the test done at a later time.    Telephone visits are billed at different rates depending on your insurance coverage. During this emergency period, for some insurers they may be billed the same as an in-person visit.  Please reach out to your insurance provider with any questions.    If during the course of the call the physician/provider feels a telephone visit is not appropriate, you will not be charged for this service.\"    Patient has given verbal consent for Telephone visit?  Yes    What phone number would you like to be contacted at? [phone]    How would you like to obtain your AVS? Mail a copy    Subjective     Regulo Roth is a 60 year old male who presents via phone visit today for the following health issues:    HPI     Calling in regards to getting his pain medication refilled    Pain is worsening in his feet and most nights has poor sleep due to it. Has failed TCAs, lyrica, neurontin and cymbalta.  Uses THC, as well as some CBD products with mild to no relief.   Last tox screen was 3/20.            Review of Systems          Objective          Vitals:  No vitals were obtained today due to virtual visit.    healthy, alert and no distress  PSYCH: Alert and oriented times 3; coherent speech, normal   rate and volume, able to articulate logical thoughts, able   to abstract reason, no tangential thoughts, no " hallucinations   or delusions  His affect is normal  RESP: No cough, no audible wheezing, able to talk in full sentences  Remainder of exam unable to be completed due to telephone visits            Assessment/Plan:    Assessment & Plan   Problem List Items Addressed This Visit        Nervous and Auditory    Chronic idiopathic axonal polyneuropathy    Relevant Medications    carBAMazepine (TEGRETOL XR) 100 MG 12 hr tablet    oxyCODONE IR (ROXICODONE) 10 MG tablet            reviewed and stable, is due for an in person visit so I filled opiates for 1 month.  Needs a tox screen as well.         No follow-ups on file.    Kaiden Chavez MD  [facility]    Phone call duration:  9 minutes

## 2020-11-19 ASSESSMENT — ASTHMA QUESTIONNAIRES: ACT_TOTALSCORE: 21

## 2020-11-19 ASSESSMENT — ANXIETY QUESTIONNAIRES: GAD7 TOTAL SCORE: 0

## 2020-12-16 ENCOUNTER — OFFICE VISIT (OUTPATIENT)
Dept: FAMILY MEDICINE | Facility: OTHER | Age: 60
End: 2020-12-16
Attending: FAMILY MEDICINE
Payer: COMMERCIAL

## 2020-12-16 VITALS
TEMPERATURE: 97.5 F | RESPIRATION RATE: 16 BRPM | HEART RATE: 79 BPM | DIASTOLIC BLOOD PRESSURE: 74 MMHG | SYSTOLIC BLOOD PRESSURE: 130 MMHG | BODY MASS INDEX: 42.18 KG/M2 | WEIGHT: 294 LBS | OXYGEN SATURATION: 96 %

## 2020-12-16 DIAGNOSIS — G60.9 CHRONIC IDIOPATHIC AXONAL POLYNEUROPATHY: Primary | ICD-10-CM

## 2020-12-16 PROCEDURE — 99213 OFFICE O/P EST LOW 20 MIN: CPT | Performed by: FAMILY MEDICINE

## 2020-12-16 PROCEDURE — 80307 DRUG TEST PRSMV CHEM ANLYZR: CPT | Mod: ZL | Performed by: FAMILY MEDICINE

## 2020-12-16 RX ORDER — OXYCODONE HYDROCHLORIDE 10 MG/1
10 TABLET ORAL EVERY 4 HOURS PRN
Qty: 150 TABLET | Refills: 0 | Status: SHIPPED | OUTPATIENT
Start: 2020-12-16 | End: 2021-04-14

## 2020-12-16 RX ORDER — PRAMIPEXOLE DIHYDROCHLORIDE 0.5 MG/1
0.5 TABLET ORAL AT BEDTIME
Qty: 90 TABLET | Refills: 3 | Status: SHIPPED | OUTPATIENT
Start: 2020-12-16 | End: 2021-01-15

## 2020-12-16 RX ORDER — OXYCODONE HYDROCHLORIDE 10 MG/1
10 TABLET ORAL EVERY 4 HOURS PRN
Qty: 150 TABLET | Refills: 0 | Status: SHIPPED | OUTPATIENT
Start: 2020-12-16 | End: 2021-03-15

## 2020-12-16 ASSESSMENT — ANXIETY QUESTIONNAIRES
5. BEING SO RESTLESS THAT IT IS HARD TO SIT STILL: NOT AT ALL
GAD7 TOTAL SCORE: 0
7. FEELING AFRAID AS IF SOMETHING AWFUL MIGHT HAPPEN: NOT AT ALL
3. WORRYING TOO MUCH ABOUT DIFFERENT THINGS: NOT AT ALL
6. BECOMING EASILY ANNOYED OR IRRITABLE: NOT AT ALL
1. FEELING NERVOUS, ANXIOUS, OR ON EDGE: NOT AT ALL
2. NOT BEING ABLE TO STOP OR CONTROL WORRYING: NOT AT ALL
IF YOU CHECKED OFF ANY PROBLEMS ON THIS QUESTIONNAIRE, HOW DIFFICULT HAVE THESE PROBLEMS MADE IT FOR YOU TO DO YOUR WORK, TAKE CARE OF THINGS AT HOME, OR GET ALONG WITH OTHER PEOPLE: NOT DIFFICULT AT ALL

## 2020-12-16 ASSESSMENT — ENCOUNTER SYMPTOMS
SHORTNESS OF BREATH: 0
FATIGUE: 0

## 2020-12-16 ASSESSMENT — PAIN SCALES - GENERAL: PAINLEVEL: EXTREME PAIN (8)

## 2020-12-16 ASSESSMENT — PATIENT HEALTH QUESTIONNAIRE - PHQ9: 5. POOR APPETITE OR OVEREATING: NOT AT ALL

## 2020-12-16 NOTE — NURSING NOTE
"Coming in for a medication check up    Chief Complaint   Patient presents with     Recheck Medication     check up       Initial /74   Pulse 79   Temp 97.5  F (36.4  C)   Resp 16   Wt 133.4 kg (294 lb)   SpO2 96%   BMI 42.18 kg/m   Estimated body mass index is 42.18 kg/m  as calculated from the following:    Height as of 3/16/20: 1.778 m (5' 10\").    Weight as of this encounter: 133.4 kg (294 lb).  Medication Reconciliation: complete    Jayashree Aguilar LPN  "

## 2020-12-17 ASSESSMENT — ANXIETY QUESTIONNAIRES: GAD7 TOTAL SCORE: 0

## 2020-12-17 ASSESSMENT — ASTHMA QUESTIONNAIRES: ACT_TOTALSCORE: 24

## 2020-12-19 LAB — PAIN DRUG SCR UR W RPTD MEDS: NORMAL

## 2020-12-21 ENCOUNTER — ALLIED HEALTH/NURSE VISIT (OUTPATIENT)
Dept: FAMILY MEDICINE | Facility: OTHER | Age: 60
End: 2020-12-21
Attending: FAMILY MEDICINE
Payer: COMMERCIAL

## 2020-12-21 DIAGNOSIS — Z23 NEED FOR ZOSTER VACCINATION: Primary | ICD-10-CM

## 2020-12-21 PROCEDURE — 90750 HZV VACC RECOMBINANT IM: CPT

## 2020-12-21 PROCEDURE — 90471 IMMUNIZATION ADMIN: CPT

## 2020-12-21 NOTE — PROGRESS NOTES
Immunization: Adult  Verified patient's name and . Stated reason for visit today is to receive shingles vaccine(s). Denied any concerns with previous immunizations. Allergies reviewed.  Screening Questionnaire Adult Immunization completed (see below). VIS handout(s) reviewed and given to take home. Shingrix prepared and administered per standing order. Administration documented in IMMUNIZATIONS (see MIIC and order for further information). Instructed to wait in lobby for 15 minutes post-injection and notify RN immediately of any adverse reaction.     Screening Questionnaire for Adult Immunization    Are you sick today?   No   Do you have allergies to vaccines or vaccine components?   No   Have you ever had a serious reaction after receiving a vaccination?   No   Have you received any vaccinations in the past 4 weeks?   No     Immunization questionnaire answers were all negative.      Kiley PETERSONN, RN on 2020 at 10:30 AM

## 2021-01-15 ENCOUNTER — VIRTUAL VISIT (OUTPATIENT)
Dept: FAMILY MEDICINE | Facility: OTHER | Age: 61
End: 2021-01-15
Attending: FAMILY MEDICINE
Payer: COMMERCIAL

## 2021-01-15 ENCOUNTER — HEALTH MAINTENANCE LETTER (OUTPATIENT)
Age: 61
End: 2021-01-15

## 2021-01-15 DIAGNOSIS — G60.9 CHRONIC IDIOPATHIC AXONAL POLYNEUROPATHY: Primary | ICD-10-CM

## 2021-01-15 PROCEDURE — 99212 OFFICE O/P EST SF 10 MIN: CPT | Mod: 95 | Performed by: FAMILY MEDICINE

## 2021-01-15 RX ORDER — PRAMIPEXOLE DIHYDROCHLORIDE 1 MG/1
1 TABLET ORAL 3 TIMES DAILY
Qty: 90 TABLET | Refills: 3 | Status: SHIPPED | OUTPATIENT
Start: 2021-01-15 | End: 2021-01-15

## 2021-01-15 RX ORDER — PRAMIPEXOLE DIHYDROCHLORIDE 1 MG/1
1 TABLET ORAL AT BEDTIME
Qty: 90 TABLET | Refills: 3 | Status: SHIPPED | OUTPATIENT
Start: 2021-01-15 | End: 2021-03-15

## 2021-01-15 ASSESSMENT — ANXIETY QUESTIONNAIRES
6. BECOMING EASILY ANNOYED OR IRRITABLE: NOT AT ALL
5. BEING SO RESTLESS THAT IT IS HARD TO SIT STILL: NOT AT ALL
2. NOT BEING ABLE TO STOP OR CONTROL WORRYING: NOT AT ALL
7. FEELING AFRAID AS IF SOMETHING AWFUL MIGHT HAPPEN: NOT AT ALL
GAD7 TOTAL SCORE: 0
IF YOU CHECKED OFF ANY PROBLEMS ON THIS QUESTIONNAIRE, HOW DIFFICULT HAVE THESE PROBLEMS MADE IT FOR YOU TO DO YOUR WORK, TAKE CARE OF THINGS AT HOME, OR GET ALONG WITH OTHER PEOPLE: NOT DIFFICULT AT ALL
3. WORRYING TOO MUCH ABOUT DIFFERENT THINGS: NOT AT ALL
1. FEELING NERVOUS, ANXIOUS, OR ON EDGE: NOT AT ALL

## 2021-01-15 ASSESSMENT — PATIENT HEALTH QUESTIONNAIRE - PHQ9: 5. POOR APPETITE OR OVEREATING: NOT AT ALL

## 2021-01-15 NOTE — PROGRESS NOTES
"Regulo is a 60 year old who is being evaluated via a billable telephone visit.      What phone number would you like to be contacted at? [phone]  How would you like to obtain your AVS? Mail a copy  Assessment & Plan   Problem List Items Addressed This Visit        Nervous and Auditory    Chronic idiopathic axonal polyneuropathy - Primary    Relevant Medications    pramipexole (MIRAPEX) 1 MG tablet         This has been really challenging, in that pretty much every med has either not worked or has given him significant side effects.  I again reviewed some options, along with some evidence that doses need to be very high.  I suspect some of the side effects might have come from up tapering too quickly.  He still does not yet want to try them, but can consider something in the future like Elavil up to 150 milligram, with a very slow taper.  For now will increase dose of Mirapex further to see if RLS is part of the issue.  Follow up in 2 months.                                     No follow-ups on file.    Kaiden Chavez MD  [facility]    Subjective     Regulo is a 60 year old who presents to clinic today for the following health issues     HPI     Follow up on neuropathy and restless legs.  The mirapex is making him sleepy, but having no symptom relief at all.  Around 6-8 PM his feet \"go crazy\".  Sleeping in the chair most nights now.  Pain meds too work all day but not in evening and night.  Tried tegretol 9 months ago, did not help so he stopped.    Patient is calling in regards to getting his medication refilled.     reviewed and stable.     Current Outpatient Medications   Medication     albuterol (PROAIR HFA/PROVENTIL HFA/VENTOLIN HFA) 108 (90 Base) MCG/ACT inhaler     aspirin 81 MG tablet     cholecalciferol (VITAMIN D-1000 MAX ST) 1000 UNITS TABS     fluocinonide (LIDEX) 0.05 % solution     fluticasone (FLONASE) 50 MCG/ACT spray     gemfibrozil (LOPID) 600 MG tablet     naloxone (NARCAN) " 4 MG/0.1ML nasal spray     omeprazole (PRILOSEC) 40 MG DR capsule     order for DME     oxyCODONE IR (ROXICODONE) 10 MG tablet     oxyCODONE IR (ROXICODONE) 10 MG tablet     oxyCODONE IR (ROXICODONE) 10 MG tablet     pantoprazole (PROTONIX) 40 MG EC tablet     pramipexole (MIRAPEX) 1 MG tablet     VITAMIN E COMPLEX PO     No current facility-administered medications for this visit.          Review of Systems         Objective           Vitals:  No vitals were obtained today due to virtual visit.    Physical Exam   healthy, alert and no distress  PSYCH: Alert and oriented times 3; coherent speech, normal   rate and volume, able to articulate logical thoughts, able   to abstract reason, no tangential thoughts, no hallucinations   or delusions  His affect is normal  RESP: No cough, no audible wheezing, able to talk in full sentences  Remainder of exam unable to be completed due to telephone visits                Phone call duration: 16 minutes

## 2021-01-15 NOTE — NURSING NOTE
Calling in regards to getting his medication refilled    Jayashree Aguilar LPN on 1/15/2021 at 11:11 AM

## 2021-01-16 ASSESSMENT — ASTHMA QUESTIONNAIRES: ACT_TOTALSCORE: 24

## 2021-01-16 ASSESSMENT — ANXIETY QUESTIONNAIRES: GAD7 TOTAL SCORE: 0

## 2021-02-05 ENCOUNTER — MYC MEDICAL ADVICE (OUTPATIENT)
Dept: FAMILY MEDICINE | Facility: OTHER | Age: 61
End: 2021-02-05

## 2021-02-16 ENCOUNTER — MYC MEDICAL ADVICE (OUTPATIENT)
Dept: FAMILY MEDICINE | Facility: OTHER | Age: 61
End: 2021-02-16

## 2021-03-15 ENCOUNTER — VIRTUAL VISIT (OUTPATIENT)
Dept: FAMILY MEDICINE | Facility: OTHER | Age: 61
End: 2021-03-15
Attending: FAMILY MEDICINE
Payer: COMMERCIAL

## 2021-03-15 VITALS — BODY MASS INDEX: 42.18 KG/M2 | WEIGHT: 294 LBS

## 2021-03-15 DIAGNOSIS — G60.9 CHRONIC IDIOPATHIC AXONAL POLYNEUROPATHY: ICD-10-CM

## 2021-03-15 PROCEDURE — 99212 OFFICE O/P EST SF 10 MIN: CPT | Mod: 95 | Performed by: FAMILY MEDICINE

## 2021-03-15 RX ORDER — OXYCODONE HYDROCHLORIDE 10 MG/1
10 TABLET ORAL EVERY 4 HOURS PRN
Qty: 180 TABLET | Refills: 0 | Status: SHIPPED | OUTPATIENT
Start: 2021-03-15 | End: 2021-04-14

## 2021-03-15 ASSESSMENT — ANXIETY QUESTIONNAIRES
7. FEELING AFRAID AS IF SOMETHING AWFUL MIGHT HAPPEN: NOT AT ALL
6. BECOMING EASILY ANNOYED OR IRRITABLE: NOT AT ALL
1. FEELING NERVOUS, ANXIOUS, OR ON EDGE: NOT AT ALL
2. NOT BEING ABLE TO STOP OR CONTROL WORRYING: NOT AT ALL
5. BEING SO RESTLESS THAT IT IS HARD TO SIT STILL: NOT AT ALL
GAD7 TOTAL SCORE: 0
IF YOU CHECKED OFF ANY PROBLEMS ON THIS QUESTIONNAIRE, HOW DIFFICULT HAVE THESE PROBLEMS MADE IT FOR YOU TO DO YOUR WORK, TAKE CARE OF THINGS AT HOME, OR GET ALONG WITH OTHER PEOPLE: NOT DIFFICULT AT ALL
3. WORRYING TOO MUCH ABOUT DIFFERENT THINGS: NOT AT ALL

## 2021-03-15 ASSESSMENT — PATIENT HEALTH QUESTIONNAIRE - PHQ9: 5. POOR APPETITE OR OVEREATING: NOT AT ALL

## 2021-03-15 ASSESSMENT — PAIN SCALES - GENERAL: PAINLEVEL: EXTREME PAIN (8)

## 2021-03-15 NOTE — PROGRESS NOTES
Regulo is a 60 year old who is being evaluated via a billable telephone visit.      What phone number would you like to be contacted at? [phone]  How would you like to obtain your AVS? Mail a copy    (G60.9) Chronic idiopathic axonal polyneuropathy  Comment: very challenging situation.  I did agree to do a 4 week bump in dosing, to 6 tabs daily, with a follow up in person. He is entertaining the idea of a taper off, then a reassessment.  Discussed with him the pumps too and for now he just wanted to get preliminary information.  Plan: oxyCODONE IR (ROXICODONE) 10 MG tablet                 Subjective   Regulo is a 60 year old who presents for the following health issues     HPI     In regards to getting his medication refilled.  He has felt more pain lately, especially at night.  He is wondering about an intrathecal morphine pump.  Continues to use the THC.  Wondering about increasing the oxycodone to 6 daily.  tox screen was as expected on 12/16/20.  He was on tegretol, had significant side effects.  Has failed Neurontin, Lyrica, Elavil.  Adds the Mirapex did not work so he has stopped this himself.      His wife is on the phone too and has questions about reduction of the opiates.    Current Outpatient Medications   Medication Instructions     albuterol (PROAIR HFA/PROVENTIL HFA/VENTOLIN HFA) 108 (90 Base) MCG/ACT inhaler 2 puffs, Inhalation, EVERY 4 HOURS PRN     aspirin (ASA) 81 mg, Oral, DAILY     cholecalciferol (VITAMIN D-1000 MAX ST) 1,000 Units, Oral, DAILY     fluocinonide (LIDEX) 0.05 % solution Topical     fluticasone (FLONASE) 50 MCG/ACT spray 2 sprays, Both Nostrils, DAILY     gemfibrozil (LOPID) 600 MG tablet Take 1 tablet (600 mg) by mouth 2 times daily (before meals)     naloxone (NARCAN) 4 mg, Alternating Nostrils, ONCE PRN, every 2-3 minutes until assistance arrives     omeprazole (PRILOSEC) 40 MG DR capsule Take 1 capsule (40 mg) by mouth every morning (before breakfast) Take 30-60 minutes before  eating.     order for DME CPAP, heated humidifier, mask, headgear, filters and tubing. For home use. Pressure:  8   Length of Need:     oxyCODONE IR (ROXICODONE) 10 mg, Oral, EVERY 4 HOURS PRN, Max 5 daily, to fill on or after 2/13/21     oxyCODONE IR (ROXICODONE) 10 mg, Oral, EVERY 4 HOURS PRN, Max 5 daily. Fill on/after 1//15/21     oxyCODONE IR (ROXICODONE) 10 mg, Oral, EVERY 4 HOURS PRN, Max of 5 daily.  Fill on or after 12/16/20     pantoprazole (PROTONIX) 40 mg, Oral, DAILY     pramipexole (MIRAPEX) 1 mg, Oral, AT BEDTIME     VITAMIN E COMPLEX PO Oral, DAILY           Review of Systems         Objective           Vitals:  No vitals were obtained today due to virtual visit.    Physical Exam   healthy, alert and no distress  PSYCH: Alert and oriented times 3; coherent speech, normal   rate and volume, able to articulate logical thoughts, able   to abstract reason, no tangential thoughts, no hallucinations   or delusions  His affect is normal  RESP: No cough, no audible wheezing, able to talk in full sentences  Remainder of exam unable to be completed due to telephone visits                Phone call duration: 13 minutes

## 2021-03-16 ASSESSMENT — ANXIETY QUESTIONNAIRES: GAD7 TOTAL SCORE: 0

## 2021-04-14 ENCOUNTER — OFFICE VISIT (OUTPATIENT)
Dept: FAMILY MEDICINE | Facility: OTHER | Age: 61
End: 2021-04-14
Attending: FAMILY MEDICINE
Payer: COMMERCIAL

## 2021-04-14 VITALS
BODY MASS INDEX: 42.07 KG/M2 | HEART RATE: 75 BPM | RESPIRATION RATE: 14 BRPM | WEIGHT: 293.2 LBS | OXYGEN SATURATION: 97 % | SYSTOLIC BLOOD PRESSURE: 130 MMHG | DIASTOLIC BLOOD PRESSURE: 64 MMHG | TEMPERATURE: 98.6 F

## 2021-04-14 DIAGNOSIS — E66.01 MORBID OBESITY (H): ICD-10-CM

## 2021-04-14 DIAGNOSIS — G60.9 CHRONIC IDIOPATHIC AXONAL POLYNEUROPATHY: ICD-10-CM

## 2021-04-14 PROCEDURE — 99213 OFFICE O/P EST LOW 20 MIN: CPT | Performed by: FAMILY MEDICINE

## 2021-04-14 RX ORDER — OXYCODONE HYDROCHLORIDE 10 MG/1
10 TABLET ORAL EVERY 4 HOURS PRN
Qty: 180 TABLET | Refills: 0 | Status: SHIPPED | OUTPATIENT
Start: 2021-04-14 | End: 2021-08-10

## 2021-04-14 RX ORDER — OXYCODONE HYDROCHLORIDE 10 MG/1
10 TABLET ORAL EVERY 4 HOURS PRN
Qty: 150 TABLET | Refills: 0 | Status: SHIPPED | OUTPATIENT
Start: 2021-04-14 | End: 2021-05-17

## 2021-04-14 RX ORDER — PRAMIPEXOLE DIHYDROCHLORIDE 1 MG/1
TABLET ORAL
COMMUNITY
Start: 2021-01-15 | End: 2022-11-01

## 2021-04-14 ASSESSMENT — ANXIETY QUESTIONNAIRES
5. BEING SO RESTLESS THAT IT IS HARD TO SIT STILL: NOT AT ALL
2. NOT BEING ABLE TO STOP OR CONTROL WORRYING: NOT AT ALL
IF YOU CHECKED OFF ANY PROBLEMS ON THIS QUESTIONNAIRE, HOW DIFFICULT HAVE THESE PROBLEMS MADE IT FOR YOU TO DO YOUR WORK, TAKE CARE OF THINGS AT HOME, OR GET ALONG WITH OTHER PEOPLE: NOT DIFFICULT AT ALL
1. FEELING NERVOUS, ANXIOUS, OR ON EDGE: NOT AT ALL
7. FEELING AFRAID AS IF SOMETHING AWFUL MIGHT HAPPEN: NOT AT ALL
3. WORRYING TOO MUCH ABOUT DIFFERENT THINGS: NOT AT ALL
GAD7 TOTAL SCORE: 0
6. BECOMING EASILY ANNOYED OR IRRITABLE: NOT AT ALL

## 2021-04-14 ASSESSMENT — PAIN SCALES - GENERAL: PAINLEVEL: EXTREME PAIN (8)

## 2021-04-14 ASSESSMENT — PATIENT HEALTH QUESTIONNAIRE - PHQ9: 5. POOR APPETITE OR OVEREATING: NOT AT ALL

## 2021-04-14 NOTE — PROGRESS NOTES
"    (G60.9) Chronic idiopathic axonal polyneuropathy  Comment: this is very challenging, as really very little has provided much relief.  No evidence of diversion or abuse. Refilled for 3 months,  tox screen due in 3 months.  Plan: oxyCODONE IR (ROXICODONE) 10 MG tablet,         oxyCODONE IR (ROXICODONE) 10 MG tablet,         oxyCODONE IR (ROXICODONE) 10 MG tablet             (E66.01) Morbid obesity (H)  Comment: advised weight loss  Plan:          Ivett Rajput is a 60 year old who presents for the following health issues     HPI  Follow up on his lower extremity pain.  This is very complicated.  He has met with neuro on several different times.  It is slowly getting worse. He says minimal symptoms in the day, perhaps because he is distracted.  At night \"cannot really stand it\".  Has had his spinal implant reprogrammed 4 times or more and is looking into doing it again.  Also is considering an intrathecal pain pump.   reviewed and stable.  Using medial THC with slight relief.  No longer drinking much Etoh, perhaps one a week now.  Tox screen as expected 12/16/20.            Review of Systems         Objective    /64   Pulse 75   Temp 98.6  F (37  C)   Resp 14   Wt 133 kg (293 lb 3.2 oz)   SpO2 97%   BMI 42.07 kg/m    Body mass index is 42.07 kg/m .  Physical Exam  Constitutional:       Appearance: Normal appearance.   Neurological:      General: No focal deficit present.      Mental Status: He is alert and oriented to person, place, and time.   Psychiatric:         Mood and Affect: Mood normal.         Behavior: Behavior normal.         Thought Content: Thought content normal.                        "

## 2021-04-14 NOTE — NURSING NOTE
"Coming in for a medication check up    Chief Complaint   Patient presents with     Recheck Medication     check up       Initial /64   Pulse 75   Temp 98.6  F (37  C)   Resp 14   Wt 133 kg (293 lb 3.2 oz)   SpO2 97%   BMI 42.07 kg/m   Estimated body mass index is 42.07 kg/m  as calculated from the following:    Height as of 3/16/20: 1.778 m (5' 10\").    Weight as of this encounter: 133 kg (293 lb 3.2 oz).  Medication Reconciliation: complete    Jayashree Aguilar LPN  "

## 2021-04-15 ASSESSMENT — ANXIETY QUESTIONNAIRES: GAD7 TOTAL SCORE: 0

## 2021-04-28 ENCOUNTER — ALLIED HEALTH/NURSE VISIT (OUTPATIENT)
Dept: FAMILY MEDICINE | Facility: OTHER | Age: 61
End: 2021-04-28
Attending: FAMILY MEDICINE
Payer: COMMERCIAL

## 2021-04-28 DIAGNOSIS — Z23 NEED FOR ZOSTER VACCINATION: Primary | ICD-10-CM

## 2021-04-28 PROCEDURE — 90750 HZV VACC RECOMBINANT IM: CPT

## 2021-04-28 PROCEDURE — 90471 IMMUNIZATION ADMIN: CPT

## 2021-04-28 NOTE — PROGRESS NOTES
Immunization: Adult   Verified patient's name and . Patient's parent stated reason for visit today is to receive shingrix vaccine(s). Denied any concerns with previous immunizations. Allergies reviewed.  Screening Questionnaire for Adult Immunization completed (see below). VIS handout(s) reviewed and given to patient to take home. shingrix prepared and administered IM per standing order. Administration documented in IMMUNIZATIONS (see MIIC and order for further information). Instructed to wait in lobby for 15 minutes post-injection and notify RN immediately of any adverse reaction.         Screening Questionnaire for Adult Immunization      Is the pt sick today?    no    Does the pt have allergies to vaccines or vaccine components?   no    Has the pt had a serious reaction to a vaccine in the past?  no     Has the pt received any vaccinations in the past 4 weeks?   no      Immunization questionnaire answers were all negative.     Shea Corado RN, 2021 12:40 PM

## 2021-05-13 DIAGNOSIS — K21.9 GASTROESOPHAGEAL REFLUX DISEASE: ICD-10-CM

## 2021-05-13 RX ORDER — OMEPRAZOLE 40 MG/1
CAPSULE, DELAYED RELEASE ORAL
Qty: 90 CAPSULE | Refills: 3 | Status: SHIPPED | OUTPATIENT
Start: 2021-05-13 | End: 2021-11-02

## 2021-05-17 ENCOUNTER — MYC MEDICAL ADVICE (OUTPATIENT)
Dept: FAMILY MEDICINE | Facility: OTHER | Age: 61
End: 2021-05-17

## 2021-05-17 DIAGNOSIS — G60.9 CHRONIC IDIOPATHIC AXONAL POLYNEUROPATHY: ICD-10-CM

## 2021-05-17 RX ORDER — OXYCODONE HYDROCHLORIDE 10 MG/1
10 TABLET ORAL EVERY 4 HOURS PRN
Qty: 180 TABLET | Refills: 0 | Status: SHIPPED | OUTPATIENT
Start: 2021-05-17 | End: 2021-08-10

## 2021-05-24 ENCOUNTER — MYC MEDICAL ADVICE (OUTPATIENT)
Dept: FAMILY MEDICINE | Facility: OTHER | Age: 61
End: 2021-05-24

## 2021-06-09 DIAGNOSIS — E78.1 PURE HYPERGLYCERIDEMIA: ICD-10-CM

## 2021-06-10 RX ORDER — GEMFIBROZIL 600 MG/1
TABLET, FILM COATED ORAL
Qty: 180 TABLET | Refills: 3 | Status: SHIPPED | OUTPATIENT
Start: 2021-06-10 | End: 2021-11-02

## 2021-06-10 NOTE — TELEPHONE ENCOUNTER
[facility] sent Rx request for the following:   gemfibrozil (LOPID) 600 MG tablet   Sig: Take 1 tablet (600 mg) by mouth 2 times daily (before meals)    Last Prescription Date:   10/19/2020 (Historical)  Last Fill Qty/Refills:         0, R-0    Last Office Visit:              04/14/2021 ([facility])   Future Office visit:           None noted   Fibrates Failed - 6/9/2021 10:13 AM        Failed - Lipid panel on file in past 12 months     Recent Labs   Lab Test 01/07/20  0839   CHOL 251*   TRIG 326*   HDL 40   *   NHDL 211*            Rx historical, routing in absence of PCP.  Unable to complete prescription refill per RN Medication Refill Policy.................... Yumi Fry RN ....................  6/10/2021   9:03 AM

## 2021-07-23 ENCOUNTER — OFFICE VISIT (OUTPATIENT)
Dept: FAMILY MEDICINE | Facility: OTHER | Age: 61
End: 2021-07-23
Attending: PHYSICIAN ASSISTANT
Payer: COMMERCIAL

## 2021-07-23 VITALS
RESPIRATION RATE: 18 BRPM | WEIGHT: 282.8 LBS | SYSTOLIC BLOOD PRESSURE: 156 MMHG | TEMPERATURE: 98.2 F | OXYGEN SATURATION: 95 % | DIASTOLIC BLOOD PRESSURE: 60 MMHG | BODY MASS INDEX: 40.58 KG/M2 | HEART RATE: 72 BPM

## 2021-07-23 DIAGNOSIS — S60.561A INSECT BITE OF RIGHT HAND WITH INFECTION, INITIAL ENCOUNTER: Primary | ICD-10-CM
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
## 2021-07-23 DIAGNOSIS — L08.9 INSECT BITE OF RIGHT HAND WITH INFECTION, INITIAL ENCOUNTER: Primary | ICD-10-CM

## 2021-07-23 PROCEDURE — 99213 OFFICE O/P EST LOW 20 MIN: CPT | Performed by: PHYSICIAN ASSISTANT

## 2021-07-23 RX ORDER — CEPHALEXIN 500 MG/1
500 CAPSULE ORAL 2 TIMES DAILY
Qty: 14 CAPSULE | Refills: 0 | Status: SHIPPED | OUTPATIENT
Start: 2021-07-23 | End: 2021-07-30

## 2021-07-23 ASSESSMENT — PAIN SCALES - GENERAL: PAINLEVEL: SEVERE PAIN (7)

## 2021-07-24 NOTE — NURSING NOTE
"Chief Complaint   Patient presents with     Insect Bites     Was stung by an insect that lost its stinger in his right hand.     Initial There were no vitals taken for this visit. Estimated body mass index is 42.07 kg/m  as calculated from the following:    Height as of 3/16/20: 1.778 m (5' 10\").    Weight as of 4/14/21: 133 kg (293 lb 3.2 oz).     FOOD SECURITY SCREENING QUESTIONS  Hunger Vital Signs:  Within the past 12 months we worried whether our food would run out before we got money to buy more. Never  Within the past 12 months the food we bought just didn't last and we didn't have money to get more. Never      Medication Reconciliation: Complete      João Iniguez LPN   "

## 2021-07-24 NOTE — PATIENT INSTRUCTIONS
You were prescribed an antibiotic, please take into consideration the following information:  - Take entire course of antibiotic even if you start to feel better.  - Antibiotics can cause stomach upset including nausea and diarrhea. Read your bottle or ask the pharmacist if antibiotic can be taken with food to help prevent nausea. If you have symptoms of diarrhea you can take an over-the-counter probiotic and/or increase foods with probiotics such as yogurt, Diego, sauerkraut.  -Use caution in sunlight as can lead to increased risk of sunburn while on ABX (antibiotics).     Cellulitis/Skin Infection   -If you received a prescription for a topical or oral antibiotic, please take/use as directed.   -Keep the area clean and dry.   -If needed - for pain control - we recommend alternating Tylenol and Ibuprofen if you are able to take these medications. Alternate every 4 hours as needed. I.e.: Ibuprofen at 8am, Tylenol 12pm, Ibuprofen 4pm    -Daily maximum of Tylenol is 4000mg (recommend staying under 3000mg)   -Daily maximum of Ibuprofen is 1200mg (take no more than six 200mg pills a day)

## 2021-07-24 NOTE — PROGRESS NOTES
ASSESSMENT/PLAN:    I have reviewed the nursing notes.  I have reviewed the findings, diagnosis, plan and need for follow up with the patient.    (S60.561A,  L08.9,  W57.XXXA) Insect bite of right hand with infection, initial encounter  (primary encounter diagnosis)  Comment: see below  Plan: cephALEXin (KEFLEX) 500 MG capsule        Vital signs stable. PE consistent with very mild cellulitis of dorsum of right hand MCP joint to 1 cm distal to DRUJ. Antibiotic was prescribed, Keflex PO BID x 7 days. Side effect profile of antibiotic was discused with patient, recommend increasing yogurt/probiotic use while on medication, take entire course of medication, even if feeling better prior to this (unless adverse side effect occur, recommend follow up). If fevers, chills, abnormal drainage, worsening redness or other worrisome signs occur, patient is agreeable to follow up promptly for reevaluation. OK to alternate OTC analgesics such as tylenol and ibuprofen every 4-6 hours for pain control, as well as alternating heat and ice. Daily dressing changes with triple antibiotic recommended. Patient is in agreement and understanding of the above treatment plan. All questions and concerns were addressed and answered to patient's satisfaction. AVS reviewed with patient.    Discussed warning signs/symptoms indicative of need to f/u    Follow up if symptoms persist or worsen or concerns    I explained my diagnostic considerations and recommendations to the patient, who voiced understanding and agreement with the treatment plan. All questions were answered. We discussed potential side effects of any prescribed or recommended therapies, as well as expectations for response to treatments.    Safia Gray PA-C  7/23/2021  7:43 PM    HPI:    Regulo Roth is a 61 year old male  who presents to [facility] today for concerns of infected bug bite to right hand which occurred 2 nights ago.     Causation/Event: bug bite  Symptoms:  edema and tenderness  Progression: worsening  Presence of other skin disorders: No  Recent surgery/procedure: No  Recent infection(s): No    Presence of any of the following comorbid conditions:  Diabetic: No  Tobacco Use: No  HIV/AIDS: No  Chronic Renal Disease: No  Chronic Hepatic Disease: No    Prior history of similar symptoms: yes, infected total knee, worse than this  Allergies: none    Last tetanus 2012  Treatments tried: home remedies    PCP: MD Kathy    Past Medical History:   Diagnosis Date     Dermatitis     No Comments Provided     Gastro-esophageal reflux disease without esophagitis     No Comments Provided     Other seasonal allergic rhinitis     No Comments Provided     Primary osteoarthritis of one knee     No Comments Provided     Uncomplicated asthma     No Comments Provided     Past Surgical History:   Procedure Laterality Date     ARTHROPLASTY KNEE      August 2012,left - became infected require debridment     ARTHROSCOPY KNEE      1981,acl and medial meniscus repair [Other]     COLONOSCOPY  12/09/2013    ,F/U 2023     ESOPHAGOSCOPY, GASTROSCOPY, DUODENOSCOPY (EGD), COMBINED      12/9/13,EGD     FUSION CERVICAL ANTERIOR ONE LEVEL      c6-7     INSERT STIMULATOR AND LEADS INTERNAL DORSAL COLUMN  04/2018     OTHER SURGICAL HISTORY      ,HERNIA REPAIR     OTHER SURGICAL HISTORY      ,HERNIA REPAIR     OTHER SURGICAL HISTORY      2012,205436,DEBRIDEMENT,Left,knee  infection following surgery     OTHER SURGICAL HISTORY      2012,97755.3,PA KNEE MANIPULATION     OTHER SURGICAL HISTORY      206904,MULTIPLE SLEEP LATENCY TEST WITHOUT CPAP,uses C PAP     TONSILLECTOMY, ADENOIDECTOMY, COMBINED      No Comments Provided     Social History     Tobacco Use     Smoking status: Former Smoker     Packs/day: 1.00     Years: 18.00     Pack years: 18.00     Smokeless tobacco: Never Used   Substance Use Topics     Alcohol use: Yes     Alcohol/week: 8.3 standard drinks     Comment: Alcoholic Drinks/day: occ      Current Outpatient Medications   Medication Sig Dispense Refill     albuterol (PROAIR HFA/PROVENTIL HFA/VENTOLIN HFA) 108 (90 Base) MCG/ACT inhaler Inhale 2 puffs into the lungs every 4 hours as needed for wheezing 1 Inhaler 4     aspirin 81 MG tablet Take 81 mg by mouth daily       cholecalciferol (VITAMIN D-1000 MAX ST) 1000 UNITS TABS Take 1,000 Units by mouth daily       fluocinonide (LIDEX) 0.05 % solution        fluticasone (FLONASE) 50 MCG/ACT spray Spray 2 sprays into both nostrils daily       gemfibrozil (LOPID) 600 MG tablet Take 1 tablet (600 mg) by mouth 2 times daily (before meals) 180 tablet 3     naloxone (NARCAN) 4 MG/0.1ML nasal spray Spray 1 spray (4 mg) into one nostril alternating nostrils once as needed for opioid reversal every 2-3 minutes until assistance arrives 0.2 mL 3     omeprazole (PRILOSEC) 40 MG DR capsule Take 1 capsule (40 mg) by mouth every morning (before breakfast) Take 30-60 minutes before eating. 90 capsule 3     order for DME CPAP, heated humidifier, mask, headgear, filters and tubing. For home use. Pressure:  8   Length of Need:       oxyCODONE IR (ROXICODONE) 10 MG tablet Take 1 tablet (10 mg) by mouth every 4 hours as needed for severe pain Max of 6 daily.  Fill on or after 5/13/2021 180 tablet 0     oxyCODONE IR (ROXICODONE) 10 MG tablet Take 1 tablet (10 mg) by mouth every 4 hours as needed for severe pain Max 6 daily. Fill on/after 6/12/2021 180 tablet 0     oxyCODONE IR (ROXICODONE) 10 MG tablet Take 1 tablet (10 mg) by mouth every 4 hours as needed for severe pain Max 6 daily, to fill on or after 4/14/2021 180 tablet 0     pantoprazole (PROTONIX) 40 MG EC tablet Take 40 mg by mouth daily       pramipexole (MIRAPEX) 1 MG tablet Take 1 tablet (1 mg) by mouth At Bedtime       VITAMIN E COMPLEX PO Take by mouth daily       No Known Allergies  Past medical history, past surgical history, current medications and allergies reviewed and accurate to the best of my knowledge.       ROS:  Refer to [facility]    BP (!) 156/60 (BP Location: Left arm, Patient Position: Sitting, Cuff Size: Adult Large)   Pulse 72   Temp 98.2  F (36.8  C) (Tympanic)   Resp 18   Wt 128.3 kg (282 lb 12.8 oz)   SpO2 95%   BMI 40.58 kg/m      EXAM:  General Appearance: Well appearing 61-year old male, appropriate appearance for age. No acute distress  Respiratory: normal chest wall and respirations.  Normal effort.  Clear to auscultation bilaterally, no wheezing, crackles or rhonchi.  No increased work of breathing.  No cough appreciated.  Cardiac: RRR with no murmurs  Musculoskeletal:    Right HAND PHYSICAL EXAMINATION:  Inspection: very mild cellulitis of dorsum of right hand MCP joint to 1 cm distal to DRUJ. Bite site noted - pin point overlying 2nd metacarpal shaft (skin). No fluctuance, mild calor. No drainage or head.     Palpation: Tenderness to palpation - none bony or soft tissue    ROM:    Thumb adduction/abduction/flexion/extension: ROM is full, fluid and intact   Finger flexion/extension (MCP, DIP, PIP): ROM is full, fluid and intact   Abduction/Adduction (2-5th MCP joint): ROM is full, fluid and intact   Opposition: intact     strength: intact    Special testing: Arcadio exam normal    Neurovascular status: sensation and distal pulses intact.       Dermatological: no rashes noted of exposed skin  Psychological: normal affect, alert, oriented, and pleasant.     Labs:  None    Xray:  None

## 2021-08-10 ENCOUNTER — OFFICE VISIT (OUTPATIENT)
Dept: FAMILY MEDICINE | Facility: OTHER | Age: 61
End: 2021-08-10
Attending: FAMILY MEDICINE
Payer: COMMERCIAL

## 2021-08-10 VITALS
TEMPERATURE: 98 F | RESPIRATION RATE: 14 BRPM | BODY MASS INDEX: 40 KG/M2 | HEART RATE: 64 BPM | OXYGEN SATURATION: 98 % | DIASTOLIC BLOOD PRESSURE: 72 MMHG | SYSTOLIC BLOOD PRESSURE: 130 MMHG | WEIGHT: 278.8 LBS

## 2021-08-10 DIAGNOSIS — G60.9 CHRONIC IDIOPATHIC AXONAL POLYNEUROPATHY: Primary | ICD-10-CM

## 2021-08-10 DIAGNOSIS — Z79.891 CHRONIC PRESCRIPTION OPIATE USE: ICD-10-CM

## 2021-08-10 LAB — CREAT UR-MCNC: 186 MG/DL

## 2021-08-10 PROCEDURE — 80307 DRUG TEST PRSMV CHEM ANLYZR: CPT | Mod: ZL | Performed by: FAMILY MEDICINE

## 2021-08-10 PROCEDURE — 82570 ASSAY OF URINE CREATININE: CPT | Mod: ZL | Performed by: FAMILY MEDICINE

## 2021-08-10 PROCEDURE — 99213 OFFICE O/P EST LOW 20 MIN: CPT | Performed by: FAMILY MEDICINE

## 2021-08-10 RX ORDER — OXYCODONE HYDROCHLORIDE 10 MG/1
10 TABLET ORAL EVERY 4 HOURS PRN
Qty: 180 TABLET | Refills: 0 | Status: SHIPPED | OUTPATIENT
Start: 2021-08-10 | End: 2021-11-02

## 2021-08-10 RX ORDER — PANTOPRAZOLE SODIUM 40 MG/1
40 TABLET, DELAYED RELEASE ORAL DAILY
Qty: 90 TABLET | Refills: 4 | Status: SHIPPED | OUTPATIENT
Start: 2021-08-10 | End: 2022-04-29

## 2021-08-10 RX ORDER — OXYCODONE HYDROCHLORIDE 10 MG/1
10 TABLET ORAL EVERY 4 HOURS PRN
Qty: 180 TABLET | Refills: 0 | Status: SHIPPED | OUTPATIENT
Start: 2021-09-06 | End: 2021-11-02

## 2021-08-10 RX ORDER — OXYCODONE HYDROCHLORIDE 10 MG/1
10 TABLET ORAL EVERY 4 HOURS PRN
Qty: 180 TABLET | Refills: 0 | Status: SHIPPED | OUTPATIENT
Start: 2021-10-04 | End: 2021-11-02

## 2021-08-10 ASSESSMENT — ANXIETY QUESTIONNAIRES
IF YOU CHECKED OFF ANY PROBLEMS ON THIS QUESTIONNAIRE, HOW DIFFICULT HAVE THESE PROBLEMS MADE IT FOR YOU TO DO YOUR WORK, TAKE CARE OF THINGS AT HOME, OR GET ALONG WITH OTHER PEOPLE: NOT DIFFICULT AT ALL
7. FEELING AFRAID AS IF SOMETHING AWFUL MIGHT HAPPEN: NOT AT ALL
6. BECOMING EASILY ANNOYED OR IRRITABLE: NOT AT ALL
GAD7 TOTAL SCORE: 0
1. FEELING NERVOUS, ANXIOUS, OR ON EDGE: NOT AT ALL
2. NOT BEING ABLE TO STOP OR CONTROL WORRYING: NOT AT ALL
5. BEING SO RESTLESS THAT IT IS HARD TO SIT STILL: NOT AT ALL
3. WORRYING TOO MUCH ABOUT DIFFERENT THINGS: NOT AT ALL

## 2021-08-10 ASSESSMENT — PAIN SCALES - GENERAL: PAINLEVEL: EXTREME PAIN (8)

## 2021-08-10 ASSESSMENT — PATIENT HEALTH QUESTIONNAIRE - PHQ9: 5. POOR APPETITE OR OVEREATING: NOT AT ALL

## 2021-08-10 NOTE — NURSING NOTE
"Coming in for a medication check up    Chief Complaint   Patient presents with     Recheck Medication     refill       Initial /72   Pulse 64   Temp 98  F (36.7  C)   Resp 14   Wt 126.5 kg (278 lb 12.8 oz)   SpO2 98%   BMI 40.00 kg/m   Estimated body mass index is 40 kg/m  as calculated from the following:    Height as of 3/16/20: 1.778 m (5' 10\").    Weight as of this encounter: 126.5 kg (278 lb 12.8 oz).  Medication Reconciliation: complete.  FOOD SECURITY SCREENING QUESTIONS  Hunger Vital Signs:  Within the past 12 months we worried whether our food would run out before we got money to buy more. Never  Within the past 12 months the food we bought just didn't last and we didn't have money to get more. Never  Jayashree Aguilar LPN 8/10/2021 10:23 AM      Jayashree Aguilar LPN  "

## 2021-08-10 NOTE — PROGRESS NOTES
Assessment & Plan   Problem List Items Addressed This Visit        Nervous and Auditory    Chronic idiopathic axonal polyneuropathy - Primary    Relevant Medications    oxyCODONE IR (ROXICODONE) 10 MG tablet (Start on 10/4/2021)    pantoprazole (PROTONIX) 40 MG EC tablet    oxyCODONE IR (ROXICODONE) 10 MG tablet (Start on 9/6/2021)    oxyCODONE IR (ROXICODONE) 10 MG tablet      Other Visit Diagnoses     Chronic prescription opiate use        Relevant Orders    Drug Confirmation Panel Urine with Creat        Refilled meds for 3 more months                  No follow-ups on file.    Kaiden Chavez MD  [facility]    Ivett Rajput is a 61 year old who presents for the following health issues     HPI follow up on his chronic nerve pain.  Due for a refill on his oxycodone.  Rates it at 8/10.  continue on medical THC.  Symptoms are bilateral.  stabbing pains much worse at night especially.  Up around 3:00 or so.  Every night. Has a stimulator, helps slightly.  Now uses no EtOH at all.  Has lost 15#.    Current Outpatient Medications   Medication Instructions     albuterol (PROAIR HFA/PROVENTIL HFA/VENTOLIN HFA) 108 (90 Base) MCG/ACT inhaler 2 puffs, Inhalation, EVERY 4 HOURS PRN     aspirin (ASA) 81 mg, Oral, DAILY     cholecalciferol (VITAMIN D-1000 MAX ST) 1,000 Units, Oral, DAILY     fluocinonide (LIDEX) 0.05 % solution Topical     fluticasone (FLONASE) 50 MCG/ACT spray 2 sprays, Both Nostrils, DAILY     gemfibrozil (LOPID) 600 MG tablet Take 1 tablet (600 mg) by mouth 2 times daily (before meals)     naloxone (NARCAN) 4 mg, Alternating Nostrils, ONCE PRN, every 2-3 minutes until assistance arrives     omeprazole (PRILOSEC) 40 MG DR capsule Take 1 capsule (40 mg) by mouth every morning (before breakfast) Take 30-60 minutes before eating.     order for DME CPAP, heated humidifier, mask, headgear, filters and tubing. For home use. Pressure:  8   Length of Need:     oxyCODONE IR (ROXICODONE) 10  mg, Oral, EVERY 4 HOURS PRN, Max 6 daily, to fill on or after 4/14/2021     oxyCODONE IR (ROXICODONE) 10 mg, Oral, EVERY 4 HOURS PRN, Max of 6 daily.  Fill on or after 5/13/2021     oxyCODONE IR (ROXICODONE) 10 mg, Oral, EVERY 4 HOURS PRN, Max 6 daily. Fill on/after 6/12/2021     pantoprazole (PROTONIX) 40 mg, Oral, DAILY     pramipexole (MIRAPEX) 1 MG tablet Take 1 tablet (1 mg) by mouth At Bedtime     VITAMIN E COMPLEX PO Oral, DAILY      reviewed and stable.     NEFTALY-7 SCORE 3/15/2021 4/14/2021 8/10/2021   Total Score 0 0 0       PHQ 6/30/2015 9/2/2016   PHQ-9 Total Score 0 0   Q9: Thoughts of better off dead/self-harm past 2 weeks Not at all Not at all               Review of Systems         Objective    /72   Pulse 64   Temp 98  F (36.7  C)   Resp 14   Wt 126.5 kg (278 lb 12.8 oz)   SpO2 98%   BMI 40.00 kg/m    Body mass index is 40 kg/m .  Physical Exam  Constitutional:       Appearance: Normal appearance.   Neurological:      General: No focal deficit present.      Mental Status: He is alert and oriented to person, place, and time.   Psychiatric:         Mood and Affect: Mood normal.         Behavior: Behavior normal.         Thought Content: Thought content normal.

## 2021-08-11 ASSESSMENT — ANXIETY QUESTIONNAIRES: GAD7 TOTAL SCORE: 0

## 2021-08-12 LAB
CREATININE URINE MG/DL  (SYNCED VALUE): 186 MG/DL
OXYCODONE UR CFM-MCNC: 372 NG/ML
OXYCODONE/CREAT UR: 200 NG/MG {CREAT}
OXYMORPHONE UR CFM-MCNC: 145 NG/ML
OXYMORPHONE/CREAT UR: 78 NG/MG {CREAT}

## 2021-08-29 ENCOUNTER — MYC MEDICAL ADVICE (OUTPATIENT)
Dept: FAMILY MEDICINE | Facility: OTHER | Age: 61
End: 2021-08-29

## 2021-09-11 ENCOUNTER — HEALTH MAINTENANCE LETTER (OUTPATIENT)
Age: 61
End: 2021-09-11

## 2021-10-15 ENCOUNTER — ALLIED HEALTH/NURSE VISIT (OUTPATIENT)
Dept: FAMILY MEDICINE | Facility: OTHER | Age: 61
End: 2021-10-15
Attending: FAMILY MEDICINE
Payer: COMMERCIAL

## 2021-10-15 DIAGNOSIS — Z23 NEED FOR PROPHYLACTIC VACCINATION AND INOCULATION AGAINST INFLUENZA: Primary | ICD-10-CM

## 2021-10-15 PROCEDURE — 90682 RIV4 VACC RECOMBINANT DNA IM: CPT

## 2021-10-15 PROCEDURE — 90471 IMMUNIZATION ADMIN: CPT

## 2021-10-15 NOTE — PROGRESS NOTES
Flublok administered per patient/parent request.    Aimee Talbert RN  ....................  10/15/2021   11:44 AM

## 2021-11-02 ENCOUNTER — OFFICE VISIT (OUTPATIENT)
Dept: FAMILY MEDICINE | Facility: OTHER | Age: 61
End: 2021-11-02
Attending: FAMILY MEDICINE
Payer: COMMERCIAL

## 2021-11-02 VITALS
WEIGHT: 291.6 LBS | TEMPERATURE: 97.3 F | BODY MASS INDEX: 41.84 KG/M2 | RESPIRATION RATE: 16 BRPM | HEART RATE: 91 BPM | SYSTOLIC BLOOD PRESSURE: 132 MMHG | OXYGEN SATURATION: 97 % | DIASTOLIC BLOOD PRESSURE: 72 MMHG

## 2021-11-02 DIAGNOSIS — G60.9 CHRONIC IDIOPATHIC AXONAL POLYNEUROPATHY: Primary | ICD-10-CM

## 2021-11-02 DIAGNOSIS — E78.1 PURE HYPERGLYCERIDEMIA: ICD-10-CM

## 2021-11-02 DIAGNOSIS — K21.9 GASTROESOPHAGEAL REFLUX DISEASE, UNSPECIFIED WHETHER ESOPHAGITIS PRESENT: ICD-10-CM

## 2021-11-02 DIAGNOSIS — G47.33 SLEEP APNEA, OBSTRUCTIVE: ICD-10-CM

## 2021-11-02 DIAGNOSIS — Z12.5 SCREENING FOR PROSTATE CANCER: ICD-10-CM

## 2021-11-02 LAB
ALBUMIN SERPL-MCNC: 4.6 G/DL (ref 3.5–5.7)
ALP SERPL-CCNC: 64 U/L (ref 34–104)
ALT SERPL W P-5'-P-CCNC: 24 U/L (ref 7–52)
ANION GAP SERPL CALCULATED.3IONS-SCNC: 8 MMOL/L (ref 3–14)
AST SERPL W P-5'-P-CCNC: 17 U/L (ref 13–39)
BILIRUB SERPL-MCNC: 0.3 MG/DL (ref 0.3–1)
BUN SERPL-MCNC: 15 MG/DL (ref 7–25)
CALCIUM SERPL-MCNC: 9.6 MG/DL (ref 8.6–10.3)
CHLORIDE BLD-SCNC: 103 MMOL/L (ref 98–107)
CHOLEST SERPL-MCNC: 249 MG/DL
CO2 SERPL-SCNC: 26 MMOL/L (ref 21–31)
CREAT SERPL-MCNC: 0.88 MG/DL (ref 0.7–1.3)
FASTING STATUS PATIENT QL REPORTED: YES
GFR SERPL CREATININE-BSD FRML MDRD: >90 ML/MIN/1.73M2
GLUCOSE BLD-MCNC: 123 MG/DL (ref 70–105)
HDLC SERPL-MCNC: 42 MG/DL (ref 23–92)
LDLC SERPL CALC-MCNC: 155 MG/DL
NONHDLC SERPL-MCNC: 207 MG/DL
POTASSIUM BLD-SCNC: 4.4 MMOL/L (ref 3.5–5.1)
PROT SERPL-MCNC: 7 G/DL (ref 6.4–8.9)
PSA SERPL-MCNC: 0.39 UG/L (ref 0–4)
SODIUM SERPL-SCNC: 137 MMOL/L (ref 134–144)
TRIGL SERPL-MCNC: 260 MG/DL

## 2021-11-02 PROCEDURE — 82374 ASSAY BLOOD CARBON DIOXIDE: CPT | Mod: ZL | Performed by: FAMILY MEDICINE

## 2021-11-02 PROCEDURE — 80061 LIPID PANEL: CPT | Mod: ZL | Performed by: FAMILY MEDICINE

## 2021-11-02 PROCEDURE — 82040 ASSAY OF SERUM ALBUMIN: CPT | Mod: ZL | Performed by: FAMILY MEDICINE

## 2021-11-02 PROCEDURE — G0103 PSA SCREENING: HCPCS | Mod: ZL | Performed by: FAMILY MEDICINE

## 2021-11-02 PROCEDURE — 99214 OFFICE O/P EST MOD 30 MIN: CPT | Performed by: FAMILY MEDICINE

## 2021-11-02 PROCEDURE — 36415 COLL VENOUS BLD VENIPUNCTURE: CPT | Mod: ZL | Performed by: FAMILY MEDICINE

## 2021-11-02 RX ORDER — OXYCODONE HYDROCHLORIDE 10 MG/1
10 TABLET ORAL EVERY 4 HOURS PRN
Qty: 180 TABLET | Refills: 0 | Status: SHIPPED | OUTPATIENT
Start: 2021-12-21 | End: 2022-01-26

## 2021-11-02 RX ORDER — OXYCODONE HYDROCHLORIDE 10 MG/1
10 TABLET ORAL EVERY 4 HOURS PRN
Qty: 180 TABLET | Refills: 0 | Status: SHIPPED | OUTPATIENT
Start: 2021-11-02 | End: 2022-01-26

## 2021-11-02 RX ORDER — OXYCODONE HYDROCHLORIDE 10 MG/1
10 TABLET ORAL EVERY 4 HOURS PRN
Qty: 180 TABLET | Refills: 0 | Status: SHIPPED | OUTPATIENT
Start: 2021-11-30 | End: 2022-01-26

## 2021-11-02 RX ORDER — ATORVASTATIN CALCIUM 40 MG/1
40 TABLET, FILM COATED ORAL DAILY
Qty: 90 TABLET | Refills: 4 | Status: SHIPPED | OUTPATIENT
Start: 2021-11-02 | End: 2022-04-29

## 2021-11-02 RX ORDER — GEMFIBROZIL 600 MG/1
TABLET, FILM COATED ORAL
Qty: 180 TABLET | Refills: 3 | Status: CANCELLED | OUTPATIENT
Start: 2021-11-02

## 2021-11-02 RX ORDER — OMEPRAZOLE 40 MG/1
40 CAPSULE, DELAYED RELEASE ORAL DAILY
Qty: 90 CAPSULE | Refills: 3 | Status: SHIPPED | OUTPATIENT
Start: 2021-11-02 | End: 2022-04-29

## 2021-11-02 ASSESSMENT — PATIENT HEALTH QUESTIONNAIRE - PHQ9: 5. POOR APPETITE OR OVEREATING: NOT AT ALL

## 2021-11-02 ASSESSMENT — ANXIETY QUESTIONNAIRES
7. FEELING AFRAID AS IF SOMETHING AWFUL MIGHT HAPPEN: NOT AT ALL
6. BECOMING EASILY ANNOYED OR IRRITABLE: NOT AT ALL
5. BEING SO RESTLESS THAT IT IS HARD TO SIT STILL: NOT AT ALL
GAD7 TOTAL SCORE: 0
1. FEELING NERVOUS, ANXIOUS, OR ON EDGE: NOT AT ALL
IF YOU CHECKED OFF ANY PROBLEMS ON THIS QUESTIONNAIRE, HOW DIFFICULT HAVE THESE PROBLEMS MADE IT FOR YOU TO DO YOUR WORK, TAKE CARE OF THINGS AT HOME, OR GET ALONG WITH OTHER PEOPLE: NOT DIFFICULT AT ALL
3. WORRYING TOO MUCH ABOUT DIFFERENT THINGS: NOT AT ALL
2. NOT BEING ABLE TO STOP OR CONTROL WORRYING: NOT AT ALL

## 2021-11-02 ASSESSMENT — PAIN SCALES - GENERAL: PAINLEVEL: EXTREME PAIN (8)

## 2021-11-02 NOTE — PROGRESS NOTES
Assessment & Plan   Problem List Items Addressed This Visit        Nervous and Auditory    Chronic idiopathic axonal polyneuropathy - Primary    Relevant Medications    oxyCODONE IR (ROXICODONE) 10 MG tablet (Start on 12/21/2021)    oxyCODONE IR (ROXICODONE) 10 MG tablet (Start on 11/30/2021)    oxyCODONE IR (ROXICODONE) 10 MG tablet       Respiratory    Sleep apnea, obstructive    Relevant Orders    CPAP Order for DME - ONLY FOR DME (Completed)       Digestive    GERD (gastroesophageal reflux disease)    Relevant Medications    omeprazole (PRILOSEC) 40 MG DR capsule      Other Visit Diagnoses     Pure hyperglyceridemia        Relevant Medications    atorvastatin (LIPITOR) 40 MG tablet    Other Relevant Orders    Lipid Panel    Comprehensive Metabolic Panel         Refilled the oxycodone,  This is stable, albeit not greatly effective.  Follow up in 3 months.    Will change the lopid to lipitor.  Discussed with him diet changes as well.      New CPAP ordered as well.                 Return in about 3 months (around 2/2/2022).    Kaiden Chavez MD  [facility]    Ivett Rajput is a 61 year old who presents for the following health issues     HPI follow up on his pain meds, and also needs a new CPAP machine.  Says the machine itself is leaking air.  More tried lately and is not sleeping well.    Due for a follow up med refill on his oxycodone.  Feet continue to give him significant pain wakes up from 2:00-6:00 or so nightly.  Sits in a chair and rubs lidocaine on them, without a lot of help.  No side effects from the opiates.  reviewed and stable.  Tox screen was as expected on 8/10/21.    Also due for a refill on his lopid.  No side effects form this, has had isolated elevated triglycerides.  Last labs over 1 year ago.     Current Outpatient Medications   Medication     albuterol (PROAIR HFA/PROVENTIL HFA/VENTOLIN HFA) 108 (90 Base) MCG/ACT inhaler     aspirin 81 MG tablet     atorvastatin  (LIPITOR) 40 MG tablet     cholecalciferol (VITAMIN D-1000 MAX ST) 1000 UNITS TABS     fluocinonide (LIDEX) 0.05 % solution     fluticasone (FLONASE) 50 MCG/ACT spray     naloxone (NARCAN) 4 MG/0.1ML nasal spray     omeprazole (PRILOSEC) 40 MG DR capsule     order for DME     [START ON 12/21/2021] oxyCODONE IR (ROXICODONE) 10 MG tablet     [START ON 11/30/2021] oxyCODONE IR (ROXICODONE) 10 MG tablet     oxyCODONE IR (ROXICODONE) 10 MG tablet     pantoprazole (PROTONIX) 40 MG EC tablet     pramipexole (MIRAPEX) 1 MG tablet     VITAMIN E COMPLEX PO     No current facility-administered medications for this visit.               Review of Systems         Objective    /72   Pulse 91   Temp 97.3  F (36.3  C)   Resp 16   Wt 132.3 kg (291 lb 9.6 oz)   SpO2 97%   BMI 41.84 kg/m    Body mass index is 41.84 kg/m .  Physical Exam  Constitutional:       Appearance: Normal appearance.   Cardiovascular:      Rate and Rhythm: Normal rate and regular rhythm.      Heart sounds: No murmur heard.   No friction rub. No gallop.    Pulmonary:      Effort: Pulmonary effort is normal. No respiratory distress.      Breath sounds: No stridor.   Neurological:      General: No focal deficit present.      Mental Status: He is alert and oriented to person, place, and time.   Psychiatric:         Mood and Affect: Mood normal.         Behavior: Behavior normal.         Thought Content: Thought content normal.

## 2021-11-02 NOTE — NURSING NOTE
"Coming in for a medication check up    Chief Complaint   Patient presents with     Recheck Medication     check up       Initial /72   Pulse 91   Temp 97.3  F (36.3  C)   Resp 16   Wt 132.3 kg (291 lb 9.6 oz)   SpO2 97%   BMI 41.84 kg/m   Estimated body mass index is 41.84 kg/m  as calculated from the following:    Height as of 3/16/20: 1.778 m (5' 10\").    Weight as of this encounter: 132.3 kg (291 lb 9.6 oz).  Medication Reconciliation: complete.  FOOD SECURITY SCREENING QUESTIONS  Hunger Vital Signs:  Within the past 12 months we worried whether our food would run out before we got money to buy more. Never  Within the past 12 months the food we bought just didn't last and we didn't have money to get more. Never  Jayashree Aguilar LPN 11/2/2021 10:27 AM      Jayashree Aguilar LPN  "

## 2021-11-03 ASSESSMENT — ANXIETY QUESTIONNAIRES: GAD7 TOTAL SCORE: 0

## 2021-11-15 ENCOUNTER — MEDICAL CORRESPONDENCE (OUTPATIENT)
Dept: HEALTH INFORMATION MANAGEMENT | Facility: OTHER | Age: 61
End: 2021-11-15
Payer: COMMERCIAL

## 2021-12-06 ENCOUNTER — TELEPHONE (OUTPATIENT)
Dept: FAMILY MEDICINE | Facility: OTHER | Age: 61
End: 2021-12-06
Payer: COMMERCIAL

## 2021-12-06 DIAGNOSIS — G47.33 SLEEP APNEA, OBSTRUCTIVE: Primary | ICD-10-CM

## 2021-12-06 NOTE — TELEPHONE ENCOUNTER
Please call regarding precription for CPAP machine,sounds like he is needing a sleep study done, please call to clarify, thank you!      Karuna Robledo on 12/6/2021 at 2:13 PM

## 2021-12-09 DIAGNOSIS — G47.33 OSA (OBSTRUCTIVE SLEEP APNEA): Primary | ICD-10-CM

## 2022-01-26 ENCOUNTER — OFFICE VISIT (OUTPATIENT)
Dept: FAMILY MEDICINE | Facility: OTHER | Age: 62
End: 2022-01-26
Attending: FAMILY MEDICINE
Payer: COMMERCIAL

## 2022-01-26 VITALS
HEART RATE: 88 BPM | BODY MASS INDEX: 42.99 KG/M2 | RESPIRATION RATE: 16 BRPM | TEMPERATURE: 97.2 F | DIASTOLIC BLOOD PRESSURE: 72 MMHG | WEIGHT: 299.6 LBS | SYSTOLIC BLOOD PRESSURE: 138 MMHG | OXYGEN SATURATION: 97 %

## 2022-01-26 DIAGNOSIS — G60.9 CHRONIC IDIOPATHIC AXONAL POLYNEUROPATHY: Primary | ICD-10-CM

## 2022-01-26 LAB — CREAT UR-MCNC: 79 MG/DL

## 2022-01-26 PROCEDURE — 99213 OFFICE O/P EST LOW 20 MIN: CPT | Performed by: FAMILY MEDICINE

## 2022-01-26 PROCEDURE — 80307 DRUG TEST PRSMV CHEM ANLYZR: CPT | Mod: ZL | Performed by: FAMILY MEDICINE

## 2022-01-26 RX ORDER — OXYCODONE HYDROCHLORIDE 10 MG/1
10 TABLET ORAL EVERY 4 HOURS PRN
Qty: 180 TABLET | Refills: 0 | Status: SHIPPED | OUTPATIENT
Start: 2022-03-23 | End: 2022-04-29

## 2022-01-26 RX ORDER — OXYCODONE HYDROCHLORIDE 10 MG/1
10 TABLET ORAL EVERY 4 HOURS PRN
Qty: 180 TABLET | Refills: 0 | Status: SHIPPED | OUTPATIENT
Start: 2022-01-26 | End: 2022-04-29

## 2022-01-26 RX ORDER — OXYCODONE HYDROCHLORIDE 10 MG/1
10 TABLET ORAL EVERY 4 HOURS PRN
Qty: 180 TABLET | Refills: 0 | Status: SHIPPED | OUTPATIENT
Start: 2022-02-23 | End: 2022-04-29

## 2022-01-26 ASSESSMENT — PAIN SCALES - GENERAL: PAINLEVEL: EXTREME PAIN (8)

## 2022-01-26 ASSESSMENT — PATIENT HEALTH QUESTIONNAIRE - PHQ9: SUM OF ALL RESPONSES TO PHQ QUESTIONS 1-9: 5

## 2022-01-26 NOTE — NURSING NOTE
"Coming in for a three month check up    Chief Complaint   Patient presents with     Recheck Medication     3 month check       Initial /72   Pulse 88   Temp 97.2  F (36.2  C)   Resp 16   Wt 135.9 kg (299 lb 9.6 oz)   SpO2 97%   BMI 42.99 kg/m   Estimated body mass index is 42.99 kg/m  as calculated from the following:    Height as of 3/16/20: 1.778 m (5' 10\").    Weight as of this encounter: 135.9 kg (299 lb 9.6 oz).  Medication Reconciliation: complete.  FOOD SECURITY SCREENING QUESTIONS  Hunger Vital Signs:  Within the past 12 months we worried whether our food would run out before we got money to buy more. Never  Within the past 12 months the food we bought just didn't last and we didn't have money to get more. Never  Jayashree Aguilar LPN 1/26/2022 11:22 AM      Jayashree Aguilar LPN  "

## 2022-01-26 NOTE — PROGRESS NOTES
Assessment & Plan   Problem List Items Addressed This Visit        Nervous and Auditory    Chronic idiopathic axonal polyneuropathy    Relevant Medications    oxyCODONE IR (ROXICODONE) 10 MG tablet (Start on 3/23/2022)    oxyCODONE IR (ROXICODONE) 10 MG tablet (Start on 2/23/2022)    oxyCODONE IR (ROXICODONE) 10 MG tablet           This is very challenging.  Has tried everything I can imagine.  Refilled meds and follow up in 3 months.                 No follow-ups on file.    Kaiden Chavez MD  [facility]    Ivett Rajput is a 61 year old who presents for the following health issues     History of Present Illness       He eats 0-1 servings of fruits and vegetables daily.He consumes 0 sweetened beverage(s) daily.He exercises with enough effort to increase his heart rate 20 to 29 minutes per day.  He exercises with enough effort to increase his heart rate 4 days per week.   He is taking medications regularly.     Follow up on his pain meds.    Pain History:  When did you first notice your pain? - More than 6 weeks   Have you seen this provider for your pain in the past?   Yes   Where in your body do you have pain? feet  Are you seeing anyone else for your pain? No      NEFTALY-7 SCORE 4/14/2021 8/10/2021 11/2/2021   Total Score 0 0 0           PHQ-9 SCORE 6/30/2015 9/2/2016 1/26/2022   PHQ-9 Total Score 0 0 5     PEG Score 1/26/2022   PEG Total Score 9       Chronic Pain Follow Up:    Location of pain: feet  Analgesia/pain control:    - Recent changes:  no    - Overall control: Inadequate pain control    - Current treatments: swimming again, prescription meds   Adherence:     - Do you ever take more pain medicine than prescribed? No    - When did you take your last dose of pain medicine?  1/2 hour ago   Adverse effects: No     He has failed cymbalta, Elavil, lyrica, neurontin, carbamazepine, all with significant side effects.  Is getting his spinal stimulator implant reprogrammed this afternoon.   Symptoms in feet are severe especially at night.    PDMP Review       Value Time User    State PDMP site checked  Yes 11/2/2021 10:38 AM Kaiden Chavez MD        Last CSA Agreement:   CSA -- Patient Level:    CSA: None found at the patient level.       Last UDS: 8/12/2021            Review of Systems         Objective    /72   Pulse 88   Temp 97.2  F (36.2  C)   Resp 16   Wt 135.9 kg (299 lb 9.6 oz)   SpO2 97%   BMI 42.99 kg/m    Body mass index is 42.99 kg/m .  Physical Exam  Constitutional:       Appearance: Normal appearance.   Neurological:      General: No focal deficit present.      Mental Status: He is alert and oriented to person, place, and time.   Psychiatric:         Mood and Affect: Mood normal.         Behavior: Behavior normal.         Thought Content: Thought content normal.

## 2022-01-26 NOTE — LETTER
January 31, 2022       Regulo Roth  [address] [place] [address]        Dear ,    We are writing to inform you of your test results.    Your test results fall within the expected range(s) or remain unchanged from previous results.  Please continue with current treatment plan.    Resulted Orders   Urine Drug Confirmation Panel   Result Value Ref Range    Oxycodone ng/mL 604 (H) <50 ng/mL    Oxycodone 765 Absent ng/mg [creat]      Comment:      Sources of oxycodone are scheduled prescription medications.    Oxymorphone ng/mL 62 (H) <50 ng/mL    Oxymorphone 78 Absent ng/mg [creat]      Comment:      Oxymorphone is an expected metabolite of oxycodone. Oxymorphone is also available as a scheduled prescription medication.    Narrative    This test was developed and its performance characteristics determined by the [facility],  [facility]. It has not been cleared or approved by the FDA. The laboratory is regulated under CLIA as qualified to perform high-complexity testing. This test is used for clinical purposes. It should not be regarded as investigational or for research.   Urine Creatinine for Drug Screen Panel   Result Value Ref Range    Creatinine Urine for Drug Screen 79 mg/dL      Comment:      The reference range has not been established for creatinine in random urines. The results should be integrated into the clinical context for interpretation.       If you have any questions or concerns, please call the clinic at the number listed above.       Sincerely,      Kaiden Chavez MD

## 2022-01-26 NOTE — LETTER
Opioid / Opioid Plus Controlled Substance Agreement    This is an agreement between you and your provider about the safe and appropriate use of controlled substance/opioids prescribed by your care team. Controlled substances are medicines that can cause physical and mental dependence (abuse).    There are strict laws about having and using these medicines. We here at [facility] are committing to working with you in your efforts to get better. To support you in this work, we ll help you schedule regular office appointments for medicine refills. If we must cancel or change your appointment for any reason, we ll make sure you have enough medicine to last until your next appointment.     As a Provider, I will:    Listen carefully to your concerns and treat you with respect.     Recommend a treatment plan that I believe is in your best interest. This plan may involve therapies other than opioid pain medication.     Talk with you often about the possible benefits, and the risk of harm of any medicine that we prescribe for you.     Provide a plan on how to taper (discontinue or go off) using this medicine if the decision is made to stop its use.    As a Patient, I understand that opioid(s):     Are a controlled substance prescribed by my care team to help me function or work and manage my condition(s).     Are strong medicines and can cause serious side effects such as:    Drowsiness, which can seriously affect my driving ability    A lower breathing rate, enough to cause death    Harm to my thinking ability     Depression     Abuse of and addiction to this medicine    Need to be taken exactly as prescribed. Combining opioids with certain medicines or chemicals (such as illegal drugs, sedatives, sleeping pills, and benzodiazepines) can be dangerous or even fatal. If I stop opioids suddenly, I may have severe withdrawal symptoms.    Do not work for all types of pain nor for all patients. If they re not helpful, I may  be asked to stop them.        The risks, benefits and side effects of these medicine(s) were explained to me. I agree that:  1. I will take part in other treatments as advised by my care team. This may be psychiatry or counseling, physical therapy, behavioral therapy, group treatment or a referral to a specialist.     2. I will keep all my appointments. I understand that this is part of the monitoring of opioids. My care team may require an office visit for EVERY opioid/controlled substance refill. If I miss appointments or don t follow instructions, my care team may stop my medicine.    3. I will take my medicines as prescribed. I will not change the dose or schedule unless my care team tells me to. There will be no refills if I run out early.     4. I may be asked to come to the clinic and complete a urine drug test or complete a pill count at any time. If I don t give a urine sample or participate in a pill count, the care team may stop my medicine.    5. I will only receive prescriptions from this clinic for chronic pain. If I am treated by another provider for acute pain issues, I will tell them that I am taking opioid pain medication for chronic pain and that I have a treatment agreement with this provider. I will inform my [facility] care team within one business day if I am given a prescription for any pain medication by another healthcare provider. My [facility] care team can contact other providers and pharmacists about my use of any medicines.    6. It is up to me to make sure that I don t run out of my medicines on weekends or holidays. If my care team is willing to refill my opioid prescription without a visit, I must request refills only during office hours. Refills may take up to 3 business days to process. I will use one pharmacy to fill all my opioid and other controlled substance prescriptions. I will notify the clinic about any changes to my insurance or medication  availability.    7. I am responsible for my prescriptions. If the medicine/prescription is lost, stolen or destroyed, it will not be replaced. I also agree not to share controlled substance medicines with anyone.    8. I am aware I should not use any illegal or recreational drugs. I agree not to drink alcohol unless my care team says I can.       9. If I enroll in the [place] Medical Cannabis program, I will tell my care team prior to my next refill.     10. I will tell my care team right away if I become pregnant, have a new medical problem treated outside of my regular clinic, or have a change in my medications.    11. I understand that this medicine can affect my thinking, judgment and reaction time. Alcohol and drugs affect the brain and body, which can affect the safety of my driving. Being under the influence of alcohol or drugs can affect my decision-making, behaviors, personal safety, and the safety of others. Driving while impaired (DWI) can occur if a person is driving, operating, or in physical control of a car, motorcycle, boat, snowmobile, ATV, motorbike, off-road vehicle, or any other motor vehicle ([place] Statute 169A.20). I understand the risk if I choose to drive or operate any vehicle or machinery.    I understand that if I do not follow any of the conditions above, my prescriptions or treatment may be stopped or changed.          Opioids  What You Need to Know    What are opioids?   Opioids are pain medicines that must be prescribed by a doctor. They are also known as narcotics.     Examples are:   1. morphine (MS Contin, Maria Dolores)  2. oxycodone (Oxycontin)  3. oxycodone and acetaminophen (Percocet)  4. hydrocodone and acetaminophen (Vicodin, Norco)   5. fentanyl patch (Duragesic)   6. hydromorphone (Dilaudid)   7. methadone  8. codeine (Tylenol #3)     What do opioids do well?   Opioids are best for severe short-term pain such as after a surgery or injury. They may work well for cancer pain. They may  help some people with long-lasting (chronic) pain.     What do opioids NOT do well?   Opioids never get rid of pain entirely, and they don t work well for most patients with chronic pain. Opioids don t reduce swelling, one of the causes of pain.                                    Other ways to manage chronic pain and improve function include:       Treat the health problem that may be causing pain    Anti-inflammation medicines, which reduce swelling and tenderness, such as ibuprofen (Advil, Motrin) or naproxen (Aleve)    Acetaminophen (Tylenol)    Antidepressants and anti-seizure medicines, especially for nerve pain    Topical treatments such as patches or creams    Injections or nerve blocks    Chiropractic or osteopathic treatment    Acupuncture, massage, deep breathing, meditation, visual imagery, aromatherapy    Use heat or ice at the pain site    Physical therapy     Exercise    Stop smoking    Take part in therapy       Risks and side effects     Talk to your doctor before you start or decide to keep taking opioids. Possible side effects include:      Lowering your breathing rate enough to cause death    Overdose, including death, especially if taking higher than prescribed doses    Worse depression symptoms; less pleasure in things you usually enjoy    Feeling tired or sluggish    Slower thoughts or cloudy thinking    Being more sensitive to pain over time; pain is harder to control    Trouble sleeping or restless sleep    Changes in hormone levels (for example, less testosterone)    Changes in sex drive or ability to have sex    Constipation    Unsafe driving    Itching and sweating    Dizziness    Nausea, throwing up and dry mouth    What else should I know about opioids?    Opioids may lead to dependence, tolerance, or addiction.      Dependence means that if you stop or reduce the medicine too quickly, you will have withdrawal symptoms. These include loose poop (diarrhea), jitters, flu-like symptoms,  nervousness and tremors. Dependence is not the same as addiction.                       Tolerance means needing higher doses over time to get the same effect. This may increase the chance of serious side effects.      Addiction is when people improperly use a substance that harms their body, their mind or their relations with others. Use of opiates can cause a relapse of addiction if you have a history of drug or alcohol abuse.      People who have used opioids for a long time may have a lower quality of life, worse depression, higher levels of pain and more visits to doctors.    You can overdose on opioids. Take these steps to lower your risk of overdose:    1. Recognize the signs:  Signs of overdose include decrease or loss of consciousness (blackout), slowed breathing, trouble waking up and blue lips. If someone is worried about overdose, they should call 911.    2. Talk to your doctor about Narcan (naloxone).   If you are at risk for overdose, you may be given a prescription for Narcan. This medicine very quickly reverses the effects of opioids.   If you overdose, a friend or family member can give you Narcan while waiting for the ambulance. They need to know the signs of overdose and how to give Narcan.     3. Don't use alcohol or street drugs.   Taking them with opioids can cause death.    4. Do not take any of these medicines unless your doctor says it s OK. Taking these with opioids can cause death:    Benzodiazepines, such as lorazepam (Ativan), alprazolam (Xanax) or diazepam (Valium)    Muscle relaxers, such as cyclobenzaprine (Flexeril)    Sleeping pills like zolpidem (Ambien)     Other opioids      How to keep you and other people safe while taking opioids:    1. Never share your opioids with others.  Opioid medicines are regulated by the Drug Enforcement Agency (DEEPTI). Selling or sharing medications is a criminal act.    2. Be sure to store opioids in a secure place, locked up if possible. Young children  can easily swallow them and overdose.    3. When you are traveling with your medicines, keep them in the original bottles. If you use a pill box, be sure you also carry a copy of your medicine list from your clinic or pharmacy.    4. Safe disposal of opioids    Most pharmacies have places to get rid of medicine, called disposal kiosks. Medicine disposal options are also available in every [facility]. Search your county and  medication disposal  to find more options. You can find more details at:  [email or web address]     I agree that my provider, clinic care team, and pharmacy may work with any city, state or federal law enforcement agency that investigates the misuse, sale, or other diversion of my controlled medicine. I will allow my provider to discuss my care with, or share a copy of, this agreement with any other treating provider, pharmacy or emergency room where I receive care.    I have read this agreement and have asked questions about anything I did not understand.    _______________________________________________________  Patient Signature - Regulo Roth _____________________                   Date     _______________________________________________________  Provider Signature - Kaiden Chavez MD   _____________________                   Date     _______________________________________________________  Witness Signature (required if provider not present while patient signing)   _____________________                   Date

## 2022-01-28 LAB
OXYCODONE UR CFM-MCNC: 604 NG/ML
OXYCODONE/CREAT UR: 765 NG/MG {CREAT}
OXYMORPHONE UR CFM-MCNC: 62 NG/ML
OXYMORPHONE/CREAT UR: 78 NG/MG {CREAT}

## 2022-04-28 ASSESSMENT — PATIENT HEALTH QUESTIONNAIRE - PHQ9
SUM OF ALL RESPONSES TO PHQ QUESTIONS 1-9: 8
SUM OF ALL RESPONSES TO PHQ QUESTIONS 1-9: 8
10. IF YOU CHECKED OFF ANY PROBLEMS, HOW DIFFICULT HAVE THESE PROBLEMS MADE IT FOR YOU TO DO YOUR WORK, TAKE CARE OF THINGS AT HOME, OR GET ALONG WITH OTHER PEOPLE: VERY DIFFICULT

## 2022-04-29 ENCOUNTER — OFFICE VISIT (OUTPATIENT)
Dept: FAMILY MEDICINE | Facility: OTHER | Age: 62
End: 2022-04-29
Attending: FAMILY MEDICINE
Payer: COMMERCIAL

## 2022-04-29 VITALS
BODY MASS INDEX: 41.24 KG/M2 | WEIGHT: 287.4 LBS | OXYGEN SATURATION: 98 % | TEMPERATURE: 97.1 F | DIASTOLIC BLOOD PRESSURE: 70 MMHG | RESPIRATION RATE: 16 BRPM | SYSTOLIC BLOOD PRESSURE: 132 MMHG | HEART RATE: 67 BPM

## 2022-04-29 DIAGNOSIS — G60.9 CHRONIC IDIOPATHIC AXONAL POLYNEUROPATHY: Primary | ICD-10-CM

## 2022-04-29 DIAGNOSIS — E78.1 PURE HYPERGLYCERIDEMIA: ICD-10-CM

## 2022-04-29 DIAGNOSIS — F33.0 MAJOR DEPRESSIVE DISORDER, RECURRENT EPISODE, MILD (H): ICD-10-CM

## 2022-04-29 DIAGNOSIS — K21.9 GASTROESOPHAGEAL REFLUX DISEASE, UNSPECIFIED WHETHER ESOPHAGITIS PRESENT: ICD-10-CM

## 2022-04-29 PROCEDURE — 99214 OFFICE O/P EST MOD 30 MIN: CPT | Performed by: FAMILY MEDICINE

## 2022-04-29 RX ORDER — OXYCODONE HYDROCHLORIDE 10 MG/1
10 TABLET ORAL EVERY 4 HOURS PRN
Qty: 180 TABLET | Refills: 0 | Status: SHIPPED | OUTPATIENT
Start: 2022-06-24 | End: 2022-06-16

## 2022-04-29 RX ORDER — OXYCODONE HYDROCHLORIDE 10 MG/1
10 TABLET ORAL EVERY 4 HOURS PRN
Qty: 180 TABLET | Refills: 0 | Status: SHIPPED | OUTPATIENT
Start: 2022-05-27 | End: 2022-06-16

## 2022-04-29 RX ORDER — ATORVASTATIN CALCIUM 40 MG/1
40 TABLET, FILM COATED ORAL DAILY
Qty: 90 TABLET | Refills: 4 | Status: SHIPPED | OUTPATIENT
Start: 2022-04-29 | End: 2023-07-09

## 2022-04-29 RX ORDER — OMEPRAZOLE 40 MG/1
40 CAPSULE, DELAYED RELEASE ORAL DAILY
Qty: 90 CAPSULE | Refills: 4 | Status: SHIPPED | OUTPATIENT
Start: 2022-04-29 | End: 2023-07-09

## 2022-04-29 RX ORDER — MIRTAZAPINE 15 MG/1
15 TABLET, FILM COATED ORAL AT BEDTIME
Qty: 90 TABLET | Refills: 4 | Status: SHIPPED | OUTPATIENT
Start: 2022-04-29 | End: 2023-02-06

## 2022-04-29 RX ORDER — OXYCODONE HYDROCHLORIDE 10 MG/1
10 TABLET ORAL EVERY 4 HOURS PRN
Qty: 180 TABLET | Refills: 0 | Status: SHIPPED | OUTPATIENT
Start: 2022-04-29 | End: 2022-06-16

## 2022-04-29 ASSESSMENT — ASTHMA QUESTIONNAIRES
ACT_TOTALSCORE: 25
QUESTION_4 LAST FOUR WEEKS HOW OFTEN HAVE YOU USED YOUR RESCUE INHALER OR NEBULIZER MEDICATION (SUCH AS ALBUTEROL): NOT AT ALL
QUESTION_5 LAST FOUR WEEKS HOW WOULD YOU RATE YOUR ASTHMA CONTROL: COMPLETELY CONTROLLED
QUESTION_3 LAST FOUR WEEKS HOW OFTEN DID YOUR ASTHMA SYMPTOMS (WHEEZING, COUGHING, SHORTNESS OF BREATH, CHEST TIGHTNESS OR PAIN) WAKE YOU UP AT NIGHT OR EARLIER THAN USUAL IN THE MORNING: NOT AT ALL
ACT_TOTALSCORE: 25
QUESTION_2 LAST FOUR WEEKS HOW OFTEN HAVE YOU HAD SHORTNESS OF BREATH: NOT AT ALL
QUESTION_1 LAST FOUR WEEKS HOW MUCH OF THE TIME DID YOUR ASTHMA KEEP YOU FROM GETTING AS MUCH DONE AT WORK, SCHOOL OR AT HOME: NONE OF THE TIME

## 2022-04-29 ASSESSMENT — PATIENT HEALTH QUESTIONNAIRE - PHQ9
SUM OF ALL RESPONSES TO PHQ QUESTIONS 1-9: 8
10. IF YOU CHECKED OFF ANY PROBLEMS, HOW DIFFICULT HAVE THESE PROBLEMS MADE IT FOR YOU TO DO YOUR WORK, TAKE CARE OF THINGS AT HOME, OR GET ALONG WITH OTHER PEOPLE: VERY DIFFICULT

## 2022-04-29 ASSESSMENT — PAIN SCALES - GENERAL: PAINLEVEL: EXTREME PAIN (8)

## 2022-04-29 NOTE — PATIENT INSTRUCTIONS
[place] counselors/therapists   Telephone Hours Kids? Address   [facility]  (Many counselors) [phone] M-Th 8-5  F 8-12 Yes [address]   [email or web address]   [facility]  (Many counselors) [phone]  Yes [address]   [email or web address]   [facility]  (Many counselors) [phone]  Yes [address]  [email or web address]   [facility]  Homero Crowell [phone]  Yes [facility]  [address]  [email or web address]   [facility]  Aditya Cisse [phone]  Yes [address]  [email or web address]   Blanka Jeff [phone]   [address]  [email or web address]   [facility]  Mihai Damon [phone]   [address]  [email or web address]   Ciera Camacho [phone]   [address]   Nora Trivedi [phone]   [address] [place] [address]   Jazlyn Clarke [phone]  Yes [address]   Roverto Alvares [phone]   [address]    Rishi Iniguez [phone]   [address] [place] [address]   Mariana Silvana [phone]   [address] [place] [address]   [email or web address]   Cristina Jung [phone]   [address] [place] [address] [address]  ([facility])  [email or web address]   [facility]  Ed Verduzco [phone]   [address] [place] [address] Counseling  Michele Rinne Cindy Thomas [phone]      [facility]: Mariah [phone]  Yes JOSE Lemus  [address]  [email or web address]   [facility]: [place] [phone]  Yes [facility] [address] [facility]  [email or web address]

## 2022-04-29 NOTE — PROGRESS NOTES
Assessment & Plan     (G60.9) Chronic idiopathic axonal polyneuropathy  (primary encounter diagnosis)  Comment: ongoing, without significant improvement  Plan: oxyCODONE IR (ROXICODONE) 10 MG tablet,         oxyCODONE IR (ROXICODONE) 10 MG tablet,         oxyCODONE IR (ROXICODONE) 10 MG tablet        Refilled this. Low risk for abuse or diversion    (E78.1) Pure hyperglyceridemia  Comment: stable  Plan: atorvastatin (LIPITOR) 40 MG tablet        Refilled per his request    (K21.9) Gastroesophageal reflux disease, unspecified whether esophagitis present  Comment: chronic,   Plan: omeprazole (PRILOSEC) 40 MG DR capsule        Refilled     (F33.0) Major depressive disorder, recurrent episode, mild (H)  Comment: likely under reporting on the PHQ.  Strongly advised grief counseling.    Plan: mirtazapine (REMERON) 15 MG tablet         Since he sleeps poorly, will try this next.  Can titrate up.  May help his pain a bit too.                   No follow-ups on file.    Kaiden Chavez MD  [facility]    Subjective  Review current opioid prescriptions    For a patient with a current opioid prescription:    Reviewed potential Opioid Use Disorder (OUD) risk factors: Yes . Low risk    Evaluate their pain severity and current treatment plan:     Provide information on non-opioid treatment options:    Refer to a specialist, as appropriate:    Get more information on pain management in the [facility] Pain Management Best Practices Inter-agency Task Force Report    Screen for potential Substance Use Disorders (SUDs)    Reviewed the patient s potential risk factors for SUDs: Yes low risk    Refer to treatment or specialist, as appropriate:     A screening tool isn t required but you may use one:  Find more information in the National [place] on Drug Abuse Screening and Assessment Tools Chart    Regulo is a 61 year old who presents for the following health issues     History of Present Illness       Mental Health  Follow-up:                    Today's PHQ-9         PHQ-9 Total Score: 8  PHQ-9 Q9 Thoughts of better off dead/self-harm past 2 weeks :   (P) Not at all    How difficult have these problems made it for you to do your work, take care of things at home, or get along with other people: Very difficult        Reason for visit:  Prescription renew  Symptoms include:  Neuropathy pain  Symptom intensity:  Severe  Symptom progression:  Worsening  Had these symptoms before:  Yes    He eats 2-3 servings of fruits and vegetables daily.He consumes 0 sweetened beverage(s) daily.He exercises with enough effort to increase his heart rate 9 or less minutes per day.  He exercises with enough effort to increase his heart rate 3 or less days per week.   He is taking medications regularly.       Pain History:  When did you first notice your pain? - Chronic Pain   Have you seen this provider for your pain in the past?   Yes   Where in your body do you have pain? feet  Are you seeing anyone else for your pain? Yes -     PHQ-9 SCORE 9/2/2016 1/26/2022 4/28/2022   PHQ-9 Total Score MyChart - - 8 (Mild depression)   PHQ-9 Total Score 0 5 8       NEFTALY-7 SCORE 4/14/2021 8/10/2021 11/2/2021   Total Score 0 0 0               Chronic Pain Follow Up:    Location of pain: legs  Analgesia/pain control:    - Recent changes:  worsening    - Overall control: Inadequate pain control    - Current treatments: doing light treatments on his legs now from [place] [facility] [facility] [place], not helping much.     Adherence:     - Do you ever take more pain medicine than prescribed? No    - When did you take your last dose of pain medicine?  today   Adverse effects: No   PDMP Review       Value Time User    State PDMP site checked  Yes 1/26/2022 11:32 AM Kaiden Chavez MD        Last CSA Agreement:   CSA -- Patient Level:    Controlled Substance Agreement - Opioid - Scan on 1/26/2022 12:05 PM: opiod       Last UDS: 1/28/2022            Review of Systems more depressed  lately. At 10 years since the surgery that triggered his pain. Family wants him to try meds. Has not done any counseling.  Sleeps very poorly.  Failed TCA meds, and cymbalta in the past, from side effects, for pain.           Objective    /70   Pulse 67   Temp 97.1  F (36.2  C)   Resp 16   Wt 130.4 kg (287 lb 6.4 oz)   SpO2 98%   BMI 41.24 kg/m    Body mass index is 41.24 kg/m .  Physical Exam  Constitutional:       Appearance: Normal appearance.   Neurological:      General: No focal deficit present.      Mental Status: He is alert and oriented to person, place, and time.   Psychiatric:         Behavior: Behavior normal.         Thought Content: Thought content normal.      Comments: Tearful, depressed mood

## 2022-04-29 NOTE — NURSING NOTE
"Chief Complaint   Patient presents with     Recheck Medication     refill       Initial /70   Pulse 67   Temp 97.1  F (36.2  C)   Resp 16   Wt 130.4 kg (287 lb 6.4 oz)   SpO2 98%   BMI 41.24 kg/m   Estimated body mass index is 41.24 kg/m  as calculated from the following:    Height as of 3/16/20: 1.778 m (5' 10\").    Weight as of this encounter: 130.4 kg (287 lb 6.4 oz).  Medication Reconciliation: complete.  FOOD SECURITY SCREENING QUESTIONS  Hunger Vital Signs:  Within the past 12 months we worried whether our food would run out before we got money to buy more. Never  Within the past 12 months the food we bought just didn't last and we didn't have money to get more. Never  Jayashree Aguilar LPN 4/29/2022 9:25 AM      Jayashree Aguilar LPN   "

## 2022-06-16 ENCOUNTER — OFFICE VISIT (OUTPATIENT)
Dept: FAMILY MEDICINE | Facility: OTHER | Age: 62
End: 2022-06-16
Attending: FAMILY MEDICINE
Payer: COMMERCIAL

## 2022-06-16 VITALS
SYSTOLIC BLOOD PRESSURE: 152 MMHG | HEIGHT: 70 IN | TEMPERATURE: 97.3 F | BODY MASS INDEX: 40.37 KG/M2 | DIASTOLIC BLOOD PRESSURE: 84 MMHG | OXYGEN SATURATION: 95 % | WEIGHT: 282 LBS | HEART RATE: 61 BPM | RESPIRATION RATE: 18 BRPM

## 2022-06-16 DIAGNOSIS — G60.9 CHRONIC IDIOPATHIC AXONAL POLYNEUROPATHY: Primary | ICD-10-CM

## 2022-06-16 DIAGNOSIS — R03.0 ELEVATED BLOOD PRESSURE READING WITHOUT DIAGNOSIS OF HYPERTENSION: ICD-10-CM

## 2022-06-16 DIAGNOSIS — Z23 NEED FOR DIPHTHERIA, TETANUS, ACELLULAR PERTUSSIS AND HAEMOPHILUS INFLUENZAE VACCINE: ICD-10-CM

## 2022-06-16 LAB — CREAT UR-MCNC: 85 MG/DL

## 2022-06-16 PROCEDURE — 80307 DRUG TEST PRSMV CHEM ANLYZR: CPT | Mod: ZL | Performed by: FAMILY MEDICINE

## 2022-06-16 PROCEDURE — 90715 TDAP VACCINE 7 YRS/> IM: CPT | Performed by: FAMILY MEDICINE

## 2022-06-16 PROCEDURE — 99214 OFFICE O/P EST MOD 30 MIN: CPT | Mod: 25 | Performed by: FAMILY MEDICINE

## 2022-06-16 PROCEDURE — 90471 IMMUNIZATION ADMIN: CPT | Performed by: FAMILY MEDICINE

## 2022-06-16 RX ORDER — OXYCODONE HYDROCHLORIDE 10 MG/1
10 TABLET ORAL EVERY 4 HOURS PRN
Qty: 180 TABLET | Refills: 0 | Status: SHIPPED | OUTPATIENT
Start: 2022-08-18 | End: 2022-11-10

## 2022-06-16 RX ORDER — OXYCODONE HYDROCHLORIDE 10 MG/1
10 TABLET ORAL EVERY 4 HOURS PRN
Qty: 180 TABLET | Refills: 0 | Status: SHIPPED | OUTPATIENT
Start: 2022-09-15 | End: 2022-11-10

## 2022-06-16 RX ORDER — OXYCODONE HYDROCHLORIDE 10 MG/1
10 TABLET ORAL EVERY 4 HOURS PRN
Qty: 180 TABLET | Refills: 0 | Status: SHIPPED | OUTPATIENT
Start: 2022-07-21 | End: 2022-11-10

## 2022-06-16 ASSESSMENT — ANXIETY QUESTIONNAIRES
GAD7 TOTAL SCORE: 1
GAD7 TOTAL SCORE: 1
7. FEELING AFRAID AS IF SOMETHING AWFUL MIGHT HAPPEN: NOT AT ALL
4. TROUBLE RELAXING: NOT AT ALL
GAD7 TOTAL SCORE: 1
3. WORRYING TOO MUCH ABOUT DIFFERENT THINGS: NOT AT ALL
1. FEELING NERVOUS, ANXIOUS, OR ON EDGE: NOT AT ALL
8. IF YOU CHECKED OFF ANY PROBLEMS, HOW DIFFICULT HAVE THESE MADE IT FOR YOU TO DO YOUR WORK, TAKE CARE OF THINGS AT HOME, OR GET ALONG WITH OTHER PEOPLE?: NOT DIFFICULT AT ALL
5. BEING SO RESTLESS THAT IT IS HARD TO SIT STILL: NOT AT ALL
7. FEELING AFRAID AS IF SOMETHING AWFUL MIGHT HAPPEN: NOT AT ALL
6. BECOMING EASILY ANNOYED OR IRRITABLE: SEVERAL DAYS
2. NOT BEING ABLE TO STOP OR CONTROL WORRYING: NOT AT ALL

## 2022-06-16 ASSESSMENT — PATIENT HEALTH QUESTIONNAIRE - PHQ9
SUM OF ALL RESPONSES TO PHQ QUESTIONS 1-9: 5
SUM OF ALL RESPONSES TO PHQ QUESTIONS 1-9: 5
10. IF YOU CHECKED OFF ANY PROBLEMS, HOW DIFFICULT HAVE THESE PROBLEMS MADE IT FOR YOU TO DO YOUR WORK, TAKE CARE OF THINGS AT HOME, OR GET ALONG WITH OTHER PEOPLE: NOT DIFFICULT AT ALL

## 2022-06-16 ASSESSMENT — PAIN SCALES - GENERAL: PAINLEVEL: EXTREME PAIN (9)

## 2022-06-16 NOTE — NURSING NOTE
"Chief Complaint   Patient presents with     Recheck Medication       Initial BP (!) 154/82   Pulse 61   Temp 97.3  F (36.3  C) (Temporal)   Resp 18   Ht 1.778 m (5' 10\")   Wt 127.9 kg (282 lb)   SpO2 95%   BMI 40.46 kg/m   Estimated body mass index is 40.46 kg/m  as calculated from the following:    Height as of this encounter: 1.778 m (5' 10\").    Weight as of this encounter: 127.9 kg (282 lb).  Medication Reconciliation: complete    FOOD SECURITY SCREENING QUESTIONS  Hunger Vital Signs:  Within the past 12 months we worried whether our food would run out before we got money to buy more. Never  Within the past 12 months the food we bought just didn't last and we didn't have money to get more. Never        Advance care directive on file? no  Advance care directive provided to patient? declined     Shalini Benites, GLENN  "

## 2022-06-16 NOTE — LETTER
June 24, 2022      Regulo Roth  [address] [place] [address]        Dear ,    We are writing to inform you of your test results.    Your test results fall within the expected range(s) or remain unchanged from previous results.  Please continue with current treatment plan.    Resulted Orders   Urine Drug Confirmation Panel   Result Value Ref Range    Oxycodone ng/mL 1,320 (H) <50 ng/mL    Oxycodone 1,553 Absent ng/mg [creat]      Comment:      Sources of oxycodone are scheduled prescription medications.    Oxymorphone ng/mL 160 (H) <50 ng/mL    Oxymorphone 188 Absent ng/mg [creat]      Comment:      Oxymorphone is an expected metabolite of oxycodone. Oxymorphone is also available as a scheduled prescription medication.    Narrative    This test was developed and its performance characteristics determined by the [facility]  [facility]. It has not been cleared or approved by the FDA. The laboratory is regulated under CLIA as qualified to perform high-complexity testing. This test is used for clinical purposes. It should not be regarded as investigational or for research.   Urine Creatinine for Drug Screen Panel   Result Value Ref Range    Creatinine Urine for Drug Screen 85 mg/dL      Comment:      The reference range has not been established for creatinine in random urines. The results should be integrated into the clinical context for interpretation.       If you have any questions or concerns, please call the clinic at the number listed above.       Sincerely,      Kaiden Chavez MD

## 2022-06-16 NOTE — PROGRESS NOTES
"  Assessment & Plan     (G60.9) Chronic idiopathic axonal polyneuropathy  (primary encounter diagnosis)  Comment: stable, but incurable and extremely hard to improve upon.    Plan: oxyCODONE IR (ROXICODONE) 10 MG tablet,         oxyCODONE IR (ROXICODONE) 10 MG tablet,         oxyCODONE IR (ROXICODONE) 10 MG tablet, Drug         Confirmation Panel Urine with Creat        Refilled above and follow up in 3 months    (Z23) Need for diphtheria, tetanus, acellular pertussis and haemophilus influenzae vaccine  Comment:    Plan: TDAP VACCINE (Adacel, Boostrix)  [[id number]]                 (R03.0) Elevated blood pressure reading without diagnosis of hypertension  Comment: single reading today. Discussed with hi ongoing weight loss too as a treatment. If next reading  Is over 135/85, then add on meds  Plan:  Get home blood pressure cuff.                 BMI:   Estimated body mass index is 40.46 kg/m  as calculated from the following:    Height as of this encounter: 1.778 m (5' 10\").    Weight as of this encounter: 127.9 kg (282 lb).           No follow-ups on file.    Kaiden Chavez MD  [facility]    Ivett Rajput is a 61 year old, presenting for the following health issues:  Recheck Medication      History of Present Illness       Reason for visit:  Meds refilled    He eats 2-3 servings of fruits and vegetables daily.He consumes 0 sweetened beverage(s) daily.He exercises with enough effort to increase his heart rate 9 or less minutes per day.  He exercises with enough effort to increase his heart rate 3 or less days per week.   He is taking medications regularly.    Today's PHQ-9         PHQ-9 Total Score: 5    PHQ-9 Q9 Thoughts of better off dead/self-harm past 2 weeks :   Not at all    How difficult have these problems made it for you to do your work, take care of things at home, or get along with other people: Not difficult at all  Today's NEFTALY-7 Score: 1       Pain History:  When did you first " "notice your pain? - Chronic Pain   Have you seen this provider for your pain in the past?   Yes   Where in your body do you have pain? Feet  Are you seeing anyone else for your pain? Yes received medical marijuana from another provider    PHQ-9 SCORE 1/26/2022 4/28/2022 6/16/2022   PHQ-9 Total Score MyChart - 8 (Mild depression) 5 (Mild depression)   PHQ-9 Total Score 5 8 5       NEFTALY-7 SCORE 8/10/2021 11/2/2021 6/16/2022   Total Score - - 1 (minimal anxiety)   Total Score 0 0 1               Chronic Pain Follow Up:    Location of pain: Feet  Analgesia/pain control:    - Recent changes:  no    - Overall control: Tolerable with discomfort    - Current treatments: opiates, THC, topical rubs, UV light therapy and TENS  Adherence:     - Do you ever take more pain medicine than prescribed? No    - When did you take your last dose of pain medicine?  32 minutes ago   Adverse effects: No   PDMP Review       Value Time User    State PDMP site checked  Yes 4/29/2022  9:58 AM Kaiden Chavez MD        Last CSA Agreement:   CSA -- Patient Level:    Controlled Substance Agreement - Opioid - Scan on 1/26/2022 12:05 PM: opiod       Last UDS: 1/28/2022        Started remeron last time, perhaps is helping him sleep a little.  Some fatigue in the AM with it and perhaps blurred vision.     No history hypertension. No chest pain or shortness of breath. Has lost about 18# this year so far. No EtOH.            Review of Systems         Objective    BP (!) 154/82   Pulse 61   Temp 97.3  F (36.3  C) (Temporal)   Resp 18   Ht 1.778 m (5' 10\")   Wt 127.9 kg (282 lb)   SpO2 95%   BMI 40.46 kg/m    Body mass index is 40.46 kg/m .  Physical Exam  Constitutional:       Appearance: Normal appearance.   Cardiovascular:      Rate and Rhythm: Normal rate and regular rhythm.   Pulmonary:      Effort: Pulmonary effort is normal. No respiratory distress.      Breath sounds: No stridor.   Neurological:      General: No focal deficit present.      " Mental Status: He is alert and oriented to person, place, and time.   Psychiatric:         Mood and Affect: Mood normal.         Behavior: Behavior normal.         Thought Content: Thought content normal.                            .  ..

## 2022-06-22 LAB
OXYCODONE UR CFM-MCNC: 1320 NG/ML
OXYCODONE/CREAT UR: 1553 NG/MG {CREAT}
OXYMORPHONE UR CFM-MCNC: 160 NG/ML
OXYMORPHONE/CREAT UR: 188 NG/MG {CREAT}

## 2022-07-22 ENCOUNTER — HOSPITAL ENCOUNTER (OUTPATIENT)
Dept: GENERAL RADIOLOGY | Facility: OTHER | Age: 62
Discharge: HOME | End: 2022-07-22
Attending: FAMILY MEDICINE
Payer: COMMERCIAL

## 2022-07-22 ENCOUNTER — OFFICE VISIT (OUTPATIENT)
Dept: FAMILY MEDICINE | Facility: OTHER | Age: 62
End: 2022-07-22
Attending: FAMILY MEDICINE
Payer: COMMERCIAL

## 2022-07-22 VITALS
SYSTOLIC BLOOD PRESSURE: 132 MMHG | BODY MASS INDEX: 39.86 KG/M2 | DIASTOLIC BLOOD PRESSURE: 76 MMHG | HEART RATE: 67 BPM | RESPIRATION RATE: 18 BRPM | OXYGEN SATURATION: 95 % | TEMPERATURE: 97.8 F | WEIGHT: 277.8 LBS

## 2022-07-22 DIAGNOSIS — M71.121 INFECTION OF RIGHT OLECRANON BURSA: Primary | ICD-10-CM

## 2022-07-22 DIAGNOSIS — E11.9 TYPE 2 DIABETES MELLITUS WITHOUT COMPLICATION, WITHOUT LONG-TERM CURRENT USE OF INSULIN (H): ICD-10-CM

## 2022-07-22 DIAGNOSIS — M71.121 INFECTION OF RIGHT OLECRANON BURSA: ICD-10-CM

## 2022-07-22 LAB
BASOPHILS # BLD AUTO: 0.1 10E3/UL (ref 0–0.2)
BASOPHILS NFR BLD AUTO: 1 %
CRP SERPL-MCNC: 98.8 MG/L
EOSINOPHIL # BLD AUTO: 0.2 10E3/UL (ref 0–0.7)
EOSINOPHIL NFR BLD AUTO: 1 %
ERYTHROCYTE [DISTWIDTH] IN BLOOD BY AUTOMATED COUNT: 12.1 % (ref 10–15)
HBA1C MFR BLD: 6.6 % (ref 4–6.2)
HCT VFR BLD AUTO: 41.9 % (ref 40–53)
HGB BLD-MCNC: 14.5 G/DL (ref 13.3–17.7)
HOLD SPECIMEN: NORMAL
IMM GRANULOCYTES # BLD: 0.1 10E3/UL
IMM GRANULOCYTES NFR BLD: 1 %
LYMPHOCYTES # BLD AUTO: 1.7 10E3/UL (ref 0.8–5.3)
LYMPHOCYTES NFR BLD AUTO: 14 %
MCH RBC QN AUTO: 31.7 PG (ref 26.5–33)
MCHC RBC AUTO-ENTMCNC: 34.6 G/DL (ref 31.5–36.5)
MCV RBC AUTO: 92 FL (ref 78–100)
MONOCYTES # BLD AUTO: 1.2 10E3/UL (ref 0–1.3)
MONOCYTES NFR BLD AUTO: 10 %
NEUTROPHILS # BLD AUTO: 9.4 10E3/UL (ref 1.6–8.3)
NEUTROPHILS NFR BLD AUTO: 73 %
NRBC # BLD AUTO: 0 10E3/UL
NRBC BLD AUTO-RTO: 0 /100
PLATELET # BLD AUTO: 171 10E3/UL (ref 150–450)
RBC # BLD AUTO: 4.58 10E6/UL (ref 4.4–5.9)
URATE SERPL-MCNC: 6.3 MG/DL (ref 4.4–7.6)
WBC # BLD AUTO: 12.7 10E3/UL (ref 4–11)

## 2022-07-22 PROCEDURE — 86140 C-REACTIVE PROTEIN: CPT | Mod: ZL | Performed by: FAMILY MEDICINE

## 2022-07-22 PROCEDURE — 250N000009 HC RX 250: Performed by: FAMILY MEDICINE

## 2022-07-22 PROCEDURE — 73080 X-RAY EXAM OF ELBOW: CPT | Mod: RT

## 2022-07-22 PROCEDURE — 36415 COLL VENOUS BLD VENIPUNCTURE: CPT | Mod: ZL | Performed by: FAMILY MEDICINE

## 2022-07-22 PROCEDURE — 85018 HEMOGLOBIN: CPT | Mod: ZL | Performed by: FAMILY MEDICINE

## 2022-07-22 PROCEDURE — 84550 ASSAY OF BLOOD/URIC ACID: CPT | Mod: ZL | Performed by: FAMILY MEDICINE

## 2022-07-22 PROCEDURE — 96372 THER/PROPH/DIAG INJ SC/IM: CPT | Performed by: FAMILY MEDICINE

## 2022-07-22 PROCEDURE — 99214 OFFICE O/P EST MOD 30 MIN: CPT | Performed by: FAMILY MEDICINE

## 2022-07-22 PROCEDURE — 83036 HEMOGLOBIN GLYCOSYLATED A1C: CPT | Mod: ZL | Performed by: FAMILY MEDICINE

## 2022-07-22 PROCEDURE — 250N000011 HC RX IP 250 OP 636: Performed by: FAMILY MEDICINE

## 2022-07-22 RX ORDER — CEFTRIAXONE SODIUM 1 G
1 VIAL (EA) INJECTION ONCE
Status: COMPLETED | OUTPATIENT
Start: 2022-07-22 | End: 2022-07-22

## 2022-07-22 RX ORDER — LIDOCAINE HYDROCHLORIDE 10 MG/ML
2.1 INJECTION, SOLUTION EPIDURAL; INFILTRATION; INTRACAUDAL; PERINEURAL ONCE
Status: COMPLETED | OUTPATIENT
Start: 2022-07-22 | End: 2022-07-22

## 2022-07-22 RX ORDER — DICLOXACILLIN SODIUM 500 MG
500 CAPSULE ORAL 4 TIMES DAILY
Qty: 40 CAPSULE | Refills: 0 | Status: SHIPPED | OUTPATIENT
Start: 2022-07-22 | End: 2022-08-01

## 2022-07-22 RX ADMIN — LIDOCAINE HYDROCHLORIDE 2.1 ML: 10 INJECTION, SOLUTION EPIDURAL; INFILTRATION; INTRACAUDAL; PERINEURAL at 14:16

## 2022-07-22 RX ADMIN — CEFTRIAXONE SODIUM 1 G: 1 INJECTION, POWDER, FOR SOLUTION INTRAMUSCULAR; INTRAVENOUS at 14:16

## 2022-07-22 ASSESSMENT — PATIENT HEALTH QUESTIONNAIRE - PHQ9
SUM OF ALL RESPONSES TO PHQ QUESTIONS 1-9: 6
10. IF YOU CHECKED OFF ANY PROBLEMS, HOW DIFFICULT HAVE THESE PROBLEMS MADE IT FOR YOU TO DO YOUR WORK, TAKE CARE OF THINGS AT HOME, OR GET ALONG WITH OTHER PEOPLE: NOT DIFFICULT AT ALL
SUM OF ALL RESPONSES TO PHQ QUESTIONS 1-9: 6

## 2022-07-22 ASSESSMENT — PAIN SCALES - GENERAL: PAINLEVEL: EXTREME PAIN (8)

## 2022-07-22 NOTE — PROGRESS NOTES
"  Assessment & Plan     ICD-10-CM    1. Infection of right olecranon bursa  M71.121 CBC and Differential     CRP inflammation     XR Elbow Right G/E 3 Views     cefTRIAXone (ROCEPHIN) injection 1 g     Uric acid     Hemoglobin A1c     dicloxacillin (DYNAPEN) 500 MG capsule     CBC and Differential     CRP inflammation     Uric acid     Hemoglobin A1c     lidocaine (PF) (XYLOCAINE) 1 % injection 2.1 mL   2. Type 2 diabetes mellitus without complication, without long-term current use of insulin (H)  E11.9      1.  Skin marker pen is used to trace extent of erythema.  2.  Discussed with patient and his wife how relatively quickly his infection began and progressed to this current point.  Therefore, if he continues to progress quickly, and spite of starting treatment, he should be seen again in rapid clinic or the emergency department as he may need IV antibiotics.  3.  I have personally reviewed the labs listed below.   I have personally reviewed the imaging test listed below.   4.  Rocephin 1 g IM was given in the clinic today.  Tomorrow morning he should start dicloxacillin 500 mg 4 times daily.  Antibiotic regimen chosen based on Up to date recommendations.    5.  A1c was checked today as this is also a risk factor/association with infectious bursitis.  A1c findings today are consistent with type 2 diabetes.    6.  Patient is to follow-up with his primary care provider within the next week for recheck of bursitis, follow-up on newly diagnosed diabetes.    Ordering of each unique test  Prescription drug management         BMI:   Estimated body mass index is 39.86 kg/m  as calculated from the following:    Height as of 6/16/22: 1.778 m (5' 10\").    Weight as of this encounter: 126 kg (277 lb 12.8 oz).           Return in about 1 week (around 7/29/2022) for Follow up with PCP.    ELEANOR KHAN MD  [facility] [place]    Ivett Rajput is a 62 year old, presenting for the following health " issues:  Swelling (Right elbow )    Regulo Roth is a 62 year old male with right elbow swelling.  He had a small cut on both elbows earlier this week.  There wasn't any fall/trauma.  He had the sudden onset of swelling, redness and tenderness.  This came up over the past 48 hours.  Last night he had fever and chills too.    No known history of gout.  No known history of diabetes but his wife does think he has had elevated blood sugars in the past.    Past history of knee infection post-op.  This was staph aureus (not MRSA).      History of Present Illness       Reason for visit:  I think bursitis in my elbow  Symptom onset:  1-3 days ago  Symptoms include:  Sore elbow and warm to the touch  Symptom intensity:  Moderate  Symptom progression:  Worsening  Had these symptoms before:  No    He eats 2-3 servings of fruits and vegetables daily.He consumes 1 sweetened beverage(s) daily.He exercises with enough effort to increase his heart rate 9 or less minutes per day.  He exercises with enough effort to increase his heart rate 3 or less days per week.   He is taking medications regularly.    Today's PHQ-9         PHQ-9 Total Score: 6    PHQ-9 Q9 Thoughts of better off dead/self-harm past 2 weeks :   Not at all    How difficult have these problems made it for you to do your work, take care of things at home, or get along with other people: Not difficult at all         Past Medical History:   Diagnosis Date     Dermatitis     No Comments Provided     Gastro-esophageal reflux disease without esophagitis     No Comments Provided     Other seasonal allergic rhinitis     No Comments Provided     Primary osteoarthritis of one knee     No Comments Provided     Uncomplicated asthma     No Comments Provided     Current Outpatient Medications   Medication     atorvastatin (LIPITOR) 40 MG tablet     cholecalciferol 25 MCG (1000 UT) TABS     dicloxacillin (DYNAPEN) 500 MG capsule     fluticasone (FLONASE) 50 MCG/ACT spray      mirtazapine (REMERON) 15 MG tablet     omeprazole (PRILOSEC) 40 MG DR capsule     order for DME     [START ON 9/15/2022] oxyCODONE IR (ROXICODONE) 10 MG tablet     [START ON 8/18/2022] oxyCODONE IR (ROXICODONE) 10 MG tablet     oxyCODONE IR (ROXICODONE) 10 MG tablet     VITAMIN E COMPLEX PO     albuterol (PROAIR HFA/PROVENTIL HFA/VENTOLIN HFA) 108 (90 Base) MCG/ACT inhaler     fluocinonide (LIDEX) 0.05 % solution     naloxone (NARCAN) 4 MG/0.1ML nasal spray     pramipexole (MIRAPEX) 1 MG tablet     No current facility-administered medications for this visit.        Review of Systems   Lab Results   Component Value Date    A1C 6.1 11/12/2019           Objective    /76   Pulse 67   Temp 97.8  F (36.6  C) (Tympanic)   Resp 18   Wt 126 kg (277 lb 12.8 oz)   SpO2 95%   BMI 39.86 kg/m    Body mass index is 39.86 kg/m .  Physical Exam   GENERAL: healthy, alert and no distress  MS: right elbow - full flexion and extension;  Right olecranon bursa is swollen, tender, red, boggy.  No drainage      Results for orders placed or performed during the hospital encounter of 07/22/22   XR Elbow Right G/E 3 Views     Status: None    Narrative    PROCEDURE: XR ELBOW RIGHT G/E 3 VIEWS 7/22/2022 2:21 PM    HISTORY: Infection of right olecranon bursa    COMPARISONS: None.    TECHNIQUE: 3 views.    FINDINGS: There is a prominent spur at the insertion site of the  triceps tendon. There is posterior soft tissue swelling which would be  consistent with an olecranon bursitis.    There is some mild spur formation along the lateral epicondyles,  probably related to prior soft tissue injury. There is no acute  fracture or dislocation and no joint effusion is seen.         Impression    IMPRESSION: Posterior olecranon spur with overlying soft tissue  swelling which may be due to a distended olecranon bursa.    JANIE GRIDER MD         SYSTEM ID:  [id number]   Results for orders placed or performed in visit on 07/22/22   CRP  inflammation     Status: Abnormal   Result Value Ref Range    CRP Inflammation 98.8 (H) <10.0 mg/L   Uric acid     Status: Normal   Result Value Ref Range    Uric Acid 6.3 4.4 - 7.6 mg/dL   Hemoglobin A1c     Status: Abnormal   Result Value Ref Range    Hemoglobin A1C 6.6 (H) 4.0 - 6.2 %   CBC with platelets and differential     Status: Abnormal   Result Value Ref Range    WBC Count 12.7 (H) 4.0 - 11.0 10e3/uL    RBC Count 4.58 4.40 - 5.90 10e6/uL    Hemoglobin 14.5 13.3 - 17.7 g/dL    Hematocrit 41.9 40.0 - 53.0 %    MCV 92 78 - 100 fL    MCH 31.7 26.5 - 33.0 pg    MCHC 34.6 31.5 - 36.5 g/dL    RDW 12.1 10.0 - 15.0 %    Platelet Count 171 150 - 450 10e3/uL    % Neutrophils 73 %    % Lymphocytes 14 %    % Monocytes 10 %    % Eosinophils 1 %    % Basophils 1 %    % Immature Granulocytes 1 %    NRBCs per 100 WBC 0 <1 /100    Absolute Neutrophils 9.4 (H) 1.6 - 8.3 10e3/uL    Absolute Lymphocytes 1.7 0.8 - 5.3 10e3/uL    Absolute Monocytes 1.2 0.0 - 1.3 10e3/uL    Absolute Eosinophils 0.2 0.0 - 0.7 10e3/uL    Absolute Basophils 0.1 0.0 - 0.2 10e3/uL    Absolute Immature Granulocytes 0.1 <=0.4 10e3/uL    Absolute NRBCs 0.0 10e3/uL   Extra Tube     Status: None    Narrative    The following orders were created for panel order Extra Tube.  Procedure                               Abnormality         Status                     ---------                               -----------         ------                     Extra Serum Separator Tu...[[id number]]                      Final result                 Please view results for these tests on the individual orders.   Extra Serum Separator Tube (SST)     Status: None   Result Value Ref Range    Hold Specimen     CBC and Differential     Status: Abnormal    Narrative    The following orders were created for panel order CBC and Differential.  Procedure                               Abnormality         Status                     ---------                               -----------          ------                     CBC with platelets and d...[[id number]]  Abnormal            Final result                 Please view results for these tests on the individual orders.                   .  ..

## 2022-07-22 NOTE — NURSING NOTE
"Chief Complaint   Patient presents with     Swelling     Right elbow      Patient is here for right elbow swelling. It started approximately 1 week ago. Swelling has gotten worse and it became red and warm to the touch.     Initial BP (!) 146/78   Pulse 67   Temp 97.8  F (36.6  C) (Tympanic)   Resp 18   Wt 126 kg (277 lb 12.8 oz)   SpO2 95%   BMI 39.86 kg/m   Estimated body mass index is 39.86 kg/m  as calculated from the following:    Height as of 6/16/22: 1.778 m (5' 10\").    Weight as of this encounter: 126 kg (277 lb 12.8 oz).  Medication Reconciliation: complete    Melissa Bhatti MA       FOOD SECURITY SCREENING QUESTIONS:    The next two questions are to help us understand your food security.  If you are feeling you need any assistance in this area, we have resources available to support you today.    Hunger Vital Signs:  Within the past 12 months we worried whether our food would run out before we got money to buy more. Never  Within the past 12 months the food we bought just didn't last and we didn't have money to get more. Never  Melissa Bhatti MA,LPN on 7/22/2022 at 1:29 PM      "

## 2022-07-22 NOTE — PATIENT INSTRUCTIONS
Follow up if: 1.  Recurrent fevers  2.  Increased pain  3.  Increasing redness, swelling  4.  Decreased range of motion of your elbow  5.  Drainage from the elbow.

## 2022-07-25 ENCOUNTER — OFFICE VISIT (OUTPATIENT)
Dept: FAMILY MEDICINE | Facility: OTHER | Age: 62
End: 2022-07-25
Attending: FAMILY MEDICINE
Payer: COMMERCIAL

## 2022-07-25 VITALS
WEIGHT: 276 LBS | TEMPERATURE: 96.9 F | OXYGEN SATURATION: 98 % | HEART RATE: 64 BPM | DIASTOLIC BLOOD PRESSURE: 88 MMHG | BODY MASS INDEX: 39.6 KG/M2 | SYSTOLIC BLOOD PRESSURE: 138 MMHG | RESPIRATION RATE: 16 BRPM

## 2022-07-25 DIAGNOSIS — M71.121 INFECTION OF RIGHT OLECRANON BURSA: Primary | ICD-10-CM

## 2022-07-25 PROCEDURE — 99213 OFFICE O/P EST LOW 20 MIN: CPT | Performed by: FAMILY MEDICINE

## 2022-07-25 RX ORDER — DICLOXACILLIN SODIUM 250 MG
CAPSULE ORAL
COMMUNITY
Start: 2022-07-22 | End: 2022-11-15

## 2022-07-25 ASSESSMENT — PAIN SCALES - GENERAL: PAINLEVEL: SEVERE PAIN (7)

## 2022-07-25 NOTE — PROGRESS NOTES
"  Assessment & Plan     (M71.121) Infection of right olecranon bursa  (primary encounter diagnosis)  Comment: this is improving on the dicloxacillin.  Therefore is very unlikely this is MRSA.  Plan: follow up closely if gets worse, would then use septra or clinda for presumed MRSA.               BMI:   Estimated body mass index is 39.6 kg/m  as calculated from the following:    Height as of 6/16/22: 1.778 m (5' 10\").    Weight as of this encounter: 125.2 kg (276 lb).   Weight management plan: Discussed healthy diet and exercise guidelines        Return if symptoms worsen or fail to improve.    Kaiden Chavez MD  [facility]    Ivett Rajput is a 62 year old accompanied by his spouse, presenting for the following health issues:  RECHECK (Infection in the right elbow)      HPI  follow up for elbow infection.  Note reviewed.  The night of the rocephin he had chills and rigors.  This seems to now be improving.  Now has regained full range of motion.  Pain is much improved but still cannot push to a stand with it. Pain is worse at night than day.  Ongoing swelling.    Current Outpatient Medications   Medication     atorvastatin (LIPITOR) 40 MG tablet     dicloxacillin (DYNAPEN) 250 MG capsule     fluticasone (FLONASE) 50 MCG/ACT spray     mirtazapine (REMERON) 15 MG tablet     omeprazole (PRILOSEC) 40 MG DR capsule     order for DME     [START ON 9/15/2022] oxyCODONE IR (ROXICODONE) 10 MG tablet     [START ON 8/18/2022] oxyCODONE IR (ROXICODONE) 10 MG tablet     oxyCODONE IR (ROXICODONE) 10 MG tablet     pramipexole (MIRAPEX) 1 MG tablet     albuterol (PROAIR HFA/PROVENTIL HFA/VENTOLIN HFA) 108 (90 Base) MCG/ACT inhaler     dicloxacillin (DYNAPEN) 500 MG capsule     naloxone (NARCAN) 4 MG/0.1ML nasal spray     No current facility-administered medications for this visit.               Review of Systems         Objective    /88   Pulse 64   Temp 96.9  F (36.1  C) (Tympanic)   Resp 16   Wt " 125.2 kg (276 lb)   SpO2 98%   BMI 39.60 kg/m    Body mass index is 39.6 kg/m .  Physical Exam  Constitutional:       Appearance: Normal appearance.   Musculoskeletal:      Comments: Right elbow with fill range of motion. Mild effusion of bursa.  Minimal erythema currently, is retracted from the marker lines by over 1 cm.    Neurological:      Mental Status: He is alert.   Psychiatric:         Mood and Affect: Mood normal.         Behavior: Behavior normal.         Thought Content: Thought content normal.                            .  ..

## 2022-07-25 NOTE — NURSING NOTE
"Chief Complaint   Patient presents with     RECHECK     Infection in the right elbow       Initial /88   Pulse 64   Temp 96.9  F (36.1  C) (Tympanic)   Resp 16   Wt 125.2 kg (276 lb)   SpO2 98%   BMI 39.60 kg/m   Estimated body mass index is 39.6 kg/m  as calculated from the following:    Height as of 6/16/22: 1.778 m (5' 10\").    Weight as of this encounter: 125.2 kg (276 lb).  Medication Reconciliation: complete    FOOD SECURITY SCREENING QUESTIONS  Hunger Vital Signs:  Within the past 12 months we worried whether our food would run out before we got money to buy more. Never  Within the past 12 months the food we bought just didn't last and we didn't have money to get more. Never  Josefina Prater LPN 7/25/2022 11:24 AM    Do you have a healthcare directive? Yes        "

## 2022-11-01 ENCOUNTER — HOSPITAL ENCOUNTER (EMERGENCY)
Facility: OTHER | Age: 62
Discharge: HOME | End: 2022-11-01
Payer: COMMERCIAL

## 2022-11-01 VITALS
OXYGEN SATURATION: 95 % | HEART RATE: 68 BPM | TEMPERATURE: 98.8 F | RESPIRATION RATE: 14 BRPM | BODY MASS INDEX: 40.8 KG/M2 | DIASTOLIC BLOOD PRESSURE: 101 MMHG | HEIGHT: 70 IN | SYSTOLIC BLOOD PRESSURE: 185 MMHG | WEIGHT: 285 LBS

## 2022-11-02 NOTE — ED TRIAGE NOTES
Pt arrives to the ED after cutting the distal tip of the second digit of left hand.    Tarsha Fabian RN on 11/1/2022 at 7:56 PM       Triage Assessment     Row Name 11/01/22 1955       Triage Assessment (Adult)    Airway WDL WDL       Respiratory WDL    Respiratory WDL WDL       Skin Circulation/Temperature WDL    Skin Circulation/Temperature WDL X  laceration to second digit of left hand

## 2022-11-10 ENCOUNTER — OFFICE VISIT (OUTPATIENT)
Dept: FAMILY MEDICINE | Facility: OTHER | Age: 62
End: 2022-11-10
Attending: FAMILY MEDICINE
Payer: COMMERCIAL

## 2022-11-10 VITALS
TEMPERATURE: 98.6 F | DIASTOLIC BLOOD PRESSURE: 80 MMHG | OXYGEN SATURATION: 97 % | WEIGHT: 286.4 LBS | HEART RATE: 76 BPM | SYSTOLIC BLOOD PRESSURE: 126 MMHG | BODY MASS INDEX: 41.09 KG/M2 | RESPIRATION RATE: 16 BRPM

## 2022-11-10 DIAGNOSIS — G60.9 CHRONIC IDIOPATHIC AXONAL POLYNEUROPATHY: Primary | ICD-10-CM

## 2022-11-10 DIAGNOSIS — E11.9 TYPE 2 DIABETES MELLITUS WITHOUT COMPLICATION, WITHOUT LONG-TERM CURRENT USE OF INSULIN (H): ICD-10-CM

## 2022-11-10 LAB
ANION GAP SERPL CALCULATED.3IONS-SCNC: 11 MMOL/L (ref 7–15)
BUN SERPL-MCNC: 12.3 MG/DL (ref 8–23)
CALCIUM SERPL-MCNC: 10 MG/DL (ref 8.8–10.2)
CHLORIDE SERPL-SCNC: 101 MMOL/L (ref 98–107)
CHOLEST SERPL-MCNC: 228 MG/DL
CREAT SERPL-MCNC: 0.97 MG/DL (ref 0.67–1.17)
CREAT UR-MCNC: 71 MG/DL
DEPRECATED HCO3 PLAS-SCNC: 26 MMOL/L (ref 22–29)
GFR SERPL CREATININE-BSD FRML MDRD: 89 ML/MIN/1.73M2
GLUCOSE SERPL-MCNC: 106 MG/DL (ref 70–99)
HBA1C MFR BLD: 6.5 % (ref 4–6.2)
HDLC SERPL-MCNC: 37 MG/DL
HOLD SPECIMEN: NORMAL
HOLD SPECIMEN: NORMAL
LDLC SERPL CALC-MCNC: ABNORMAL MG/DL
MICROALBUMIN UR-MCNC: <12 MG/L
MICROALBUMIN/CREAT UR: NORMAL MG/G{CREAT}
NONHDLC SERPL-MCNC: 191 MG/DL
POTASSIUM SERPL-SCNC: 4.6 MMOL/L (ref 3.4–5.3)
SODIUM SERPL-SCNC: 138 MMOL/L (ref 136–145)
TRIGL SERPL-MCNC: 567 MG/DL

## 2022-11-10 PROCEDURE — 90471 IMMUNIZATION ADMIN: CPT | Performed by: FAMILY MEDICINE

## 2022-11-10 PROCEDURE — 82043 UR ALBUMIN QUANTITATIVE: CPT | Mod: ZL | Performed by: FAMILY MEDICINE

## 2022-11-10 PROCEDURE — 80048 BASIC METABOLIC PNL TOTAL CA: CPT | Mod: ZL | Performed by: FAMILY MEDICINE

## 2022-11-10 PROCEDURE — 36415 COLL VENOUS BLD VENIPUNCTURE: CPT | Mod: ZL | Performed by: FAMILY MEDICINE

## 2022-11-10 PROCEDURE — 83036 HEMOGLOBIN GLYCOSYLATED A1C: CPT | Mod: ZL | Performed by: FAMILY MEDICINE

## 2022-11-10 PROCEDURE — 90677 PCV20 VACCINE IM: CPT | Performed by: FAMILY MEDICINE

## 2022-11-10 PROCEDURE — 80061 LIPID PANEL: CPT | Mod: ZL | Performed by: FAMILY MEDICINE

## 2022-11-10 PROCEDURE — 99214 OFFICE O/P EST MOD 30 MIN: CPT | Mod: 25 | Performed by: FAMILY MEDICINE

## 2022-11-10 RX ORDER — OXYCODONE HYDROCHLORIDE 10 MG/1
10 TABLET ORAL EVERY 4 HOURS PRN
Qty: 180 TABLET | Refills: 0 | Status: SHIPPED | OUTPATIENT
Start: 2022-11-10 | End: 2023-02-06

## 2022-11-10 RX ORDER — OXYCODONE HYDROCHLORIDE 10 MG/1
10 TABLET ORAL EVERY 4 HOURS PRN
Qty: 180 TABLET | Refills: 0 | Status: SHIPPED | OUTPATIENT
Start: 2022-12-08 | End: 2023-02-06

## 2022-11-10 RX ORDER — OXYCODONE HYDROCHLORIDE 10 MG/1
10 TABLET ORAL EVERY 4 HOURS PRN
Qty: 180 TABLET | Refills: 0 | Status: SHIPPED | OUTPATIENT
Start: 2023-01-05 | End: 2023-02-06

## 2022-11-10 NOTE — PROGRESS NOTES
Assessment & Plan     (G60.9) Chronic idiopathic axonal polyneuropathy  (primary encounter diagnosis)  Comment: appears to be progressing a bit, into hands now.  Has really failed every medical intervention he has tried.  Refilled the oxycodone without changes and follow up in 3 months.  Supportive cares at this point  Plan: oxyCODONE IR (ROXICODONE) 10 MG tablet,         oxyCODONE IR (ROXICODONE) 10 MG tablet,         oxyCODONE IR (ROXICODONE) 10 MG tablet             (E11.9) Type 2 diabetes mellitus without complication, without long-term current use of insulin (H)  Comment: great control, no med changes indicated  Plan: Albumin Random Urine Quantitative with Creat         Ratio, Hemoglobin A1c, Basic Metabolic Panel,         Lipid Panel, FOOT EXAM, PNEUMOCOCCAL 20 VALENT         CONJUGATE (PREVNAR 20)                            Return in about 3 months (around 2/10/2023).    Kaiden Chavez MD  [facility] AND [facility]    Ivett Rajput is a 62 year old, presenting for the following health issues:  Recheck Medication      History of Present Illness       Reason for visit:  Prescription renewal    He eats 4 or more servings of fruits and vegetables daily.He consumes 1 sweetened beverage(s) daily.He exercises with enough effort to increase his heart rate 20 to 29 minutes per day.  He exercises with enough effort to increase his heart rate 3 or less days per week.   He is taking medications regularly.         Pain History:  When did you first notice your pain? - Chronic Pain   Have you seen this provider for your pain in the past?   Yes   Where in your body do you have pain? Feet and hands  Are you seeing anyone else for your pain? No    PHQ-9 SCORE 4/28/2022 6/16/2022 7/22/2022   PHQ-9 Total Score MyChart 8 (Mild depression) 5 (Mild depression) 6 (Mild depression)   PHQ-9 Total Score 8 5 6       NEFTALY-7 SCORE 8/10/2021 11/2/2021 6/16/2022   Total Score - - 1 (minimal anxiety)   Total Score 0 0 1                Chronic Pain Follow Up:    Location of pain: hands and feet  Analgesia/pain control:    - Recent changes:  no    - Overall control: Tolerable with discomfort    - Current treatments: THC, dorsal root ganglion stimulator   Adherence:     - Do you ever take more pain medicine than prescribed? No    - When did you take your last dose of pain medicine?  today   Adverse effects: No   PDMP Review       Value Time User    State PDMP site checked  Yes 11/10/2022  4:04 PM Kaiden Chavez MD        Last CSA Agreement:   CSA -- Patient Level:     [Media Unavailable] Controlled Substance Agreement - Opioid - Scan on 1/26/2022 12:05 PM: opiod       Last UDS: 6/22/2022            Recent Labs   Lab Test 11/10/22  1632 07/22/22  1405 11/02/21  1059 01/07/20  0839 12/04/19  0920 11/12/19  1331 11/12/19  1331 04/17/18  1104   A1C 6.5* 6.6*  --   --   --   --  6.1*  --    LDL  --   --  155* 146* Cannot estimate LDL when triglyceride exceeds 400 mg/dL   < > Cannot estimate LDL when triglyceride exceeds 400 mg/dL  --    HDL 37*  --  42 40 36   < > 36  --    TRIG 567*  --  260* 326* 630*   < > 903*  --    ALT  --   --  24  --   --   --   --  44   CR 0.97  --  0.88  --   --   --   --  0.94   GFRESTIMATED 89  --  >90  --   --   --   --  83   GFRESTBLACK  --   --   --   --   --   --   --  >90   POTASSIUM 4.6  --  4.4  --   --   --   --  4.2    < > = values in this interval not displayed.        Is also due for diabetes follow up.  This is a new diagnosis for him in the last few months, but has had pre diabetes for a long time and has been making diet changes.        Review of Systems         Objective    /80   Pulse 76   Temp 98.6  F (37  C) (Tympanic)   Resp 16   Wt 129.9 kg (286 lb 6.4 oz)   SpO2 97%   BMI 41.09 kg/m    Body mass index is 41.09 kg/m .  Physical Exam  Constitutional:       Appearance: Normal appearance.   Neurological:      General: No focal deficit present.      Mental Status: He is alert and  oriented to person, place, and time.   Psychiatric:         Mood and Affect: Mood normal.         Behavior: Behavior normal.         Thought Content: Thought content normal.        Sensory exam of the foot is abnormal, tested with the monofilament to ankles. Good pulses, no lesions or ulcers, good peripheral pulses.        Results for orders placed or performed in visit on 11/10/22 (from the past 24 hour(s))   Albumin Random Urine Quantitative with Creat Ratio   Result Value Ref Range    Albumin Urine mg/L <12.0 mg/L    Albumin Urine mg/g Cr      Creatinine Urine mg/dL 71.0 mg/dL   Hemoglobin A1c   Result Value Ref Range    Hemoglobin A1C 6.5 (H) 4.0 - 6.2 %   Basic Metabolic Panel   Result Value Ref Range    Sodium 138 136 - 145 mmol/L    Potassium 4.6 3.4 - 5.3 mmol/L    Chloride 101 98 - 107 mmol/L    Carbon Dioxide (CO2) 26 22 - 29 mmol/L    Anion Gap 11 7 - 15 mmol/L    Urea Nitrogen 12.3 8.0 - 23.0 mg/dL    Creatinine 0.97 0.67 - 1.17 mg/dL    Calcium 10.0 8.8 - 10.2 mg/dL    Glucose 106 (H) 70 - 99 mg/dL    GFR Estimate 89 >60 mL/min/1.73m2   Lipid Panel   Result Value Ref Range    Cholesterol 228 (H) <200 mg/dL    Triglycerides 567 (H) <150 mg/dL    Direct Measure HDL 37 (L) >=40 mg/dL    LDL Cholesterol Calculated      Non HDL Cholesterol 191 (H) <130 mg/dL    Narrative    Cholesterol  Desirable:  <200 mg/dL    Triglycerides  Normal:  Less than 150 mg/dL  Borderline High:  150-199 mg/dL  High:  200-499 mg/dL  Very High:  Greater than or equal to 500 mg/dL    Direct Measure HDL  Female:  Greater than or equal to 50 mg/dL   Male:  Greater than or equal to 40 mg/dL    LDL Cholesterol  Desirable:  <100mg/dL  Above Desirable:  100-129 mg/dL   Borderline High:  130-159 mg/dL   High:  160-189 mg/dL   Very High:  >= 190 mg/dL    Non HDL Cholesterol  Desirable:  130 mg/dL  Above Desirable:  130-159 mg/dL  Borderline High:  160-189 mg/dL  High:  190-219 mg/dL  Very High:  Greater than or equal to 220 mg/dL   Extra  Tube    Narrative    The following orders were created for panel order Extra Tube.  Procedure                               Abnormality         Status                     ---------                               -----------         ------                     Extra Serum Separator Tu...[[id number]]                      Final result                 Please view results for these tests on the individual orders.   Extra Serum Separator Tube (SST)   Result Value Ref Range    Hold Specimen JIC    Extra Tube    Narrative    The following orders were created for panel order Extra Tube.  Procedure                               Abnormality         Status                     ---------                               -----------         ------                     Extra Serum Separator Tu...[[id number]]                      Final result                 Please view results for these tests on the individual orders.   Extra Serum Separator Tube (SST)   Result Value Ref Range    Hold Specimen JIC

## 2022-11-10 NOTE — NURSING NOTE
"Chief Complaint   Patient presents with     Recheck Medication       Initial /80   Pulse 76   Temp 98.6  F (37  C) (Tympanic)   Resp 16   Wt 129.9 kg (286 lb 6.4 oz)   SpO2 97%   BMI 41.09 kg/m   Estimated body mass index is 41.09 kg/m  as calculated from the following:    Height as of 11/1/22: 1.778 m (5' 10\").    Weight as of this encounter: 129.9 kg (286 lb 6.4 oz).  Medication Reconciliation: complete    FOOD SECURITY SCREENING QUESTIONS  Hunger Vital Signs:  Within the past 12 months we worried whether our food would run out before we got money to buy more. Never  Within the past 12 months the food we bought just didn't last and we didn't have money to get more. Never  Josefina Prater LPN 11/10/2022 3:53 PM        "

## 2022-11-15 ENCOUNTER — OFFICE VISIT (OUTPATIENT)
Dept: FAMILY MEDICINE | Facility: OTHER | Age: 62
End: 2022-11-15
Attending: FAMILY MEDICINE
Payer: COMMERCIAL

## 2022-11-15 VITALS
RESPIRATION RATE: 20 BRPM | OXYGEN SATURATION: 96 % | HEART RATE: 74 BPM | SYSTOLIC BLOOD PRESSURE: 146 MMHG | TEMPERATURE: 97.6 F | DIASTOLIC BLOOD PRESSURE: 86 MMHG

## 2022-11-15 DIAGNOSIS — S61.211D LACERATION OF LEFT INDEX FINGER WITHOUT FOREIGN BODY WITHOUT DAMAGE TO NAIL, SUBSEQUENT ENCOUNTER: ICD-10-CM

## 2022-11-15 DIAGNOSIS — Z48.02 ENCOUNTER FOR REMOVAL OF SUTURES: Primary | ICD-10-CM

## 2022-11-15 PROCEDURE — 99212 OFFICE O/P EST SF 10 MIN: CPT | Performed by: FAMILY MEDICINE

## 2022-11-15 ASSESSMENT — PAIN SCALES - GENERAL: PAINLEVEL: SEVERE PAIN (6)

## 2022-11-15 ASSESSMENT — ANXIETY QUESTIONNAIRES
IF YOU CHECKED OFF ANY PROBLEMS ON THIS QUESTIONNAIRE, HOW DIFFICULT HAVE THESE PROBLEMS MADE IT FOR YOU TO DO YOUR WORK, TAKE CARE OF THINGS AT HOME, OR GET ALONG WITH OTHER PEOPLE: NOT DIFFICULT AT ALL
1. FEELING NERVOUS, ANXIOUS, OR ON EDGE: NOT AT ALL
3. WORRYING TOO MUCH ABOUT DIFFERENT THINGS: NOT AT ALL
5. BEING SO RESTLESS THAT IT IS HARD TO SIT STILL: NOT AT ALL
6. BECOMING EASILY ANNOYED OR IRRITABLE: NOT AT ALL
GAD7 TOTAL SCORE: 0
7. FEELING AFRAID AS IF SOMETHING AWFUL MIGHT HAPPEN: NOT AT ALL
GAD7 TOTAL SCORE: 0
2. NOT BEING ABLE TO STOP OR CONTROL WORRYING: NOT AT ALL

## 2022-11-15 ASSESSMENT — PATIENT HEALTH QUESTIONNAIRE - PHQ9
5. POOR APPETITE OR OVEREATING: NOT AT ALL
SUM OF ALL RESPONSES TO PHQ QUESTIONS 1-9: 0

## 2022-11-15 NOTE — PROGRESS NOTES
Assessment & Plan       ICD-10-CM    1. Encounter for removal of sutures  Z48.02       2. Laceration of left index finger without foreign body without damage to nail, subsequent encounter  S61.211D         Laceration appears to be healing.  He apparently had some deep sutures placed, but needs surface interrupted sutures removed.  The middle portion did not adhere together.  He presented the day following his laceration, so this is expected.  Remove the interrupted sutures.  Recommend keeping covered.  He has have a history of past infection requiring prolonged need for IV antibiotics and has not done well with antibiotics since.  Seems to be tolerating bacitracin.  Therefore to prevent potential finger cellulitis, use bacitracin consistently to help keep the wound soft and prevent infection.  Return for increasing pain, swelling, redness.    Prateek Estrada MD  [facility]    Ivett Rajput is a 62 year old, presenting for the following health issues:  Suture Removal      Suture Removal    History of Present Illness       Reason for visit:  Suture removal    He eats 4 or more servings of fruits and vegetables daily.He consumes 1 sweetened beverage(s) daily.He exercises with enough effort to increase his heart rate 20 to 29 minutes per day.  He exercises with enough effort to increase his heart rate 3 or less days per week.   He is taking medications regularly.     Lacerated on a tin can  Sutures placed on 11/2 in [place]  Using Bacitracin lately to help with eschar    Review of Systems   Fingertip numbness      Objective    BP (!) 146/86 (BP Location: Right arm, Patient Position: Sitting, Cuff Size: Adult Large)   Pulse 74   Temp 97.6  F (36.4  C) (Tympanic)   Resp 20   SpO2 96%   There is no height or weight on file to calculate BMI.  Physical Exam   Healing, approximated laceration of palmar portion of distal phalanx on left second finger

## 2022-11-15 NOTE — PATIENT INSTRUCTIONS
Keep wound soft with vaseline or bacitracin  Return for pain, spreading redness, increased drainage

## 2022-11-15 NOTE — NURSING NOTE
"Patient presents to the clinic for suture removal of 2nd finger on the left hand.  Patient went to [facility] on 11-2-2022.    FOOD SECURITY SCREENING QUESTIONS:    The next two questions are to help us understand your food security.  If you are feeling you need any assistance in this area, we have resources available to support you today.    Hunger Vital Signs:  Within the past 12 months we worried whether our food would run out before we got money to buy more. Never  Within the past 12 months the food we bought just didn't last and we didn't have money to get more. Never    Advance Care Directive on file? no  Advance Care Directive provided to patient? Declined.    Chief Complaint   Patient presents with     Suture Removal       Initial BP (!) 146/86 (BP Location: Right arm, Patient Position: Sitting, Cuff Size: Adult Large)   Pulse 74   Temp 97.6  F (36.4  C) (Tympanic)   Resp 20   SpO2 96%  Estimated body mass index is 41.09 kg/m  as calculated from the following:    Height as of 11/1/22: 1.778 m (5' 10\").    Weight as of 11/10/22: 129.9 kg (286 lb 6.4 oz).  Medication Reconciliation: complete        Tosin Saini LPN       "

## 2023-01-01 ENCOUNTER — MYC MEDICAL ADVICE (OUTPATIENT)
Dept: FAMILY MEDICINE | Facility: OTHER | Age: 63
End: 2023-01-01

## 2023-01-01 DIAGNOSIS — G47.33 SLEEP APNEA, OBSTRUCTIVE: Primary | ICD-10-CM

## 2023-01-30 ENCOUNTER — TRANSFERRED RECORDS (OUTPATIENT)
Dept: HEALTH INFORMATION MANAGEMENT | Facility: OTHER | Age: 63
End: 2023-01-30
Payer: COMMERCIAL

## 2023-01-30 LAB — RETINOPATHY: NEGATIVE

## 2023-02-06 ENCOUNTER — VIRTUAL VISIT (OUTPATIENT)
Dept: FAMILY MEDICINE | Facility: OTHER | Age: 63
End: 2023-02-06
Attending: FAMILY MEDICINE
Payer: COMMERCIAL

## 2023-02-06 DIAGNOSIS — F33.0 MAJOR DEPRESSIVE DISORDER, RECURRENT EPISODE, MILD (H): ICD-10-CM

## 2023-02-06 DIAGNOSIS — G60.9 CHRONIC IDIOPATHIC AXONAL POLYNEUROPATHY: Primary | ICD-10-CM

## 2023-02-06 PROCEDURE — 99212 OFFICE O/P EST SF 10 MIN: CPT | Mod: TEL | Performed by: FAMILY MEDICINE

## 2023-02-06 RX ORDER — MIRTAZAPINE 30 MG/1
30 TABLET, FILM COATED ORAL AT BEDTIME
Qty: 90 TABLET | Refills: 4 | Status: SHIPPED | OUTPATIENT
Start: 2023-02-06 | End: 2024-02-02

## 2023-02-06 RX ORDER — OXYCODONE HYDROCHLORIDE 10 MG/1
10 TABLET ORAL EVERY 4 HOURS PRN
Qty: 180 TABLET | Refills: 0 | Status: SHIPPED | OUTPATIENT
Start: 2023-03-06 | End: 2023-04-14

## 2023-02-06 RX ORDER — OXYCODONE HYDROCHLORIDE 10 MG/1
10 TABLET ORAL EVERY 4 HOURS PRN
Qty: 180 TABLET | Refills: 0 | Status: SHIPPED | OUTPATIENT
Start: 2023-04-03 | End: 2023-04-14

## 2023-02-06 RX ORDER — OXYCODONE HYDROCHLORIDE 10 MG/1
10 TABLET ORAL EVERY 4 HOURS PRN
Qty: 180 TABLET | Refills: 0 | Status: SHIPPED | OUTPATIENT
Start: 2023-02-06 | End: 2023-04-14

## 2023-02-06 ASSESSMENT — ASTHMA QUESTIONNAIRES
QUESTION_1 LAST FOUR WEEKS HOW MUCH OF THE TIME DID YOUR ASTHMA KEEP YOU FROM GETTING AS MUCH DONE AT WORK, SCHOOL OR AT HOME: NONE OF THE TIME
QUESTION_5 LAST FOUR WEEKS HOW WOULD YOU RATE YOUR ASTHMA CONTROL: COMPLETELY CONTROLLED
ACT_TOTALSCORE: 25
QUESTION_4 LAST FOUR WEEKS HOW OFTEN HAVE YOU USED YOUR RESCUE INHALER OR NEBULIZER MEDICATION (SUCH AS ALBUTEROL): NOT AT ALL
ACT_TOTALSCORE: 25
QUESTION_3 LAST FOUR WEEKS HOW OFTEN DID YOUR ASTHMA SYMPTOMS (WHEEZING, COUGHING, SHORTNESS OF BREATH, CHEST TIGHTNESS OR PAIN) WAKE YOU UP AT NIGHT OR EARLIER THAN USUAL IN THE MORNING: NOT AT ALL
QUESTION_2 LAST FOUR WEEKS HOW OFTEN HAVE YOU HAD SHORTNESS OF BREATH: NOT AT ALL

## 2023-02-06 ASSESSMENT — ANXIETY QUESTIONNAIRES
1. FEELING NERVOUS, ANXIOUS, OR ON EDGE: NOT AT ALL
GAD7 TOTAL SCORE: 0
3. WORRYING TOO MUCH ABOUT DIFFERENT THINGS: NOT AT ALL
GAD7 TOTAL SCORE: 0
GAD7 TOTAL SCORE: 0
7. FEELING AFRAID AS IF SOMETHING AWFUL MIGHT HAPPEN: NOT AT ALL
5. BEING SO RESTLESS THAT IT IS HARD TO SIT STILL: NOT AT ALL
8. IF YOU CHECKED OFF ANY PROBLEMS, HOW DIFFICULT HAVE THESE MADE IT FOR YOU TO DO YOUR WORK, TAKE CARE OF THINGS AT HOME, OR GET ALONG WITH OTHER PEOPLE?: NOT DIFFICULT AT ALL
4. TROUBLE RELAXING: NOT AT ALL
6. BECOMING EASILY ANNOYED OR IRRITABLE: NOT AT ALL
2. NOT BEING ABLE TO STOP OR CONTROL WORRYING: NOT AT ALL
7. FEELING AFRAID AS IF SOMETHING AWFUL MIGHT HAPPEN: NOT AT ALL
IF YOU CHECKED OFF ANY PROBLEMS ON THIS QUESTIONNAIRE, HOW DIFFICULT HAVE THESE PROBLEMS MADE IT FOR YOU TO DO YOUR WORK, TAKE CARE OF THINGS AT HOME, OR GET ALONG WITH OTHER PEOPLE: NOT DIFFICULT AT ALL

## 2023-02-06 ASSESSMENT — PATIENT HEALTH QUESTIONNAIRE - PHQ9
10. IF YOU CHECKED OFF ANY PROBLEMS, HOW DIFFICULT HAVE THESE PROBLEMS MADE IT FOR YOU TO DO YOUR WORK, TAKE CARE OF THINGS AT HOME, OR GET ALONG WITH OTHER PEOPLE: NOT DIFFICULT AT ALL
SUM OF ALL RESPONSES TO PHQ QUESTIONS 1-9: 0
SUM OF ALL RESPONSES TO PHQ QUESTIONS 1-9: 0

## 2023-02-06 NOTE — NURSING NOTE
"No chief complaint on file.      Initial There were no vitals taken for this visit. Estimated body mass index is 41.09 kg/m  as calculated from the following:    Height as of 11/1/22: 1.778 m (5' 10\").    Weight as of 11/10/22: 129.9 kg (286 lb 6.4 oz).  Medication Reconciliation: complete    FOOD SECURITY SCREENING QUESTIONS  Hunger Vital Signs:  Within the past 12 months we worried whether our food would run out before we got money to buy more. Never  Within the past 12 months the food we bought just didn't last and we didn't have money to get more. Never  Josefina Prater LPN 2/6/2023 11:19 AM          "

## 2023-02-06 NOTE — PROGRESS NOTES
"Regulo is a 62 year old who is being evaluated via a billable telephone visit.      What phone number would you like to be contacted at? [phone]  How would you like to obtain your AVS? Arabella    Distant Location (provider location):  On-site    Assessment & Plan     (G60.9) Chronic idiopathic axonal polyneuropathy  (primary encounter diagnosis)  Comment: stable, low risk for abuse or diverson.   stable/  Refilled for 3 months.  Next visit in person, will sign a new contract then.  Also increased the remeron for perhaps better sleep and pain relief.   Plan: oxyCODONE IR (ROXICODONE) 10 MG tablet,         oxyCODONE IR (ROXICODONE) 10 MG tablet,         oxyCODONE IR (ROXICODONE) 10 MG tablet             (F33.0) Major depressive disorder, recurrent episode, mild (H)  Comment:    Plan: mirtazapine (REMERON) 30 MG tablet                        BMI:   Estimated body mass index is 41.09 kg/m  as calculated from the following:    Height as of 11/1/22: 1.778 m (5' 10\").    Weight as of 11/10/22: 129.9 kg (286 lb 6.4 oz).           No follow-ups on file.    Kaiden Chavez MD  [facility] HOSPITAL    Subjective   Regulo is a 62 year old, presenting for the following health issues:  No chief complaint on file.      History of Present Illness       Reason for visit:  Medication follow up    He eats 4 or more servings of fruits and vegetables daily.He consumes 0 sweetened beverage(s) daily.He exercises with enough effort to increase his heart rate 60 or more minutes per day.  He exercises with enough effort to increase his heart rate 3 or less days per week.   He is taking medications regularly.    Today's PHQ-9         PHQ-9 Total Score: 0    PHQ-9 Q9 Thoughts of better off dead/self-harm past 2 weeks :   Not at all    How difficult have these problems made it for you to do your work, take care of things at home, or get along with other people: Not difficult at all  Today's NEFTALY-7 Score: 0       Pain History:  When " "did you first notice your pain? - Chronic Pain   Have you seen this provider for your pain in the past? Yes    He is feeling sick, and as a result wanted to do a phone visit. Has a head cold with productive cough.  Negative COVID so far.      Pain has been worse in the last few weeks.  Poor sleep from 1:00-5:00 or so. Had been cutting back on his therapy and now resumed this which has helped a bit to improve his symptoms.  Severe burning pain into his feet.  Hard time walking on them.  Has a stimulator that helps a little with sharp pains.  Now getting some symptoms into his hands too. Stiffness, numbness and weakness at times. He is back swimming and does 1 mile 2-3 times a week.     reviewed and stable.     Current Outpatient Medications   Medication     albuterol (PROAIR HFA/PROVENTIL HFA/VENTOLIN HFA) 108 (90 Base) MCG/ACT inhaler     atorvastatin (LIPITOR) 40 MG tablet     fluticasone (FLONASE) 50 MCG/ACT spray     mirtazapine (REMERON) 15 MG tablet     naloxone (NARCAN) 4 MG/0.1ML nasal spray     omeprazole (PRILOSEC) 40 MG DR capsule     order for DME     oxyCODONE IR (ROXICODONE) 10 MG tablet     oxyCODONE IR (ROXICODONE) 10 MG tablet     oxyCODONE IR (ROXICODONE) 10 MG tablet     No current facility-administered medications for this visit.           Review of Systems         Objective    Vitals - Patient Reported  Weight (Patient Reported): 131.5 kg (290 lb)  Height (Patient Reported): 177.8 cm (5' 10\")  BMI (Based on Pt Reported Ht/Wt): 41.61  Pain Score: Moderate Pain (5)  Pain Loc: Foot        Physical Exam   healthy, alert and no distress  PSYCH: Alert and oriented times 3; coherent speech, normal   rate and volume, able to articulate logical thoughts, able   to abstract reason, no tangential thoughts, no hallucinations   or delusions  His affect is normal  RESP: Mild  cough, no audible wheezing, able to talk in full sentences  Remainder of exam unable to be completed due to telephone " visits                Phone call duration: 11 minutes

## 2023-02-27 ENCOUNTER — MYC MEDICAL ADVICE (OUTPATIENT)
Dept: FAMILY MEDICINE | Facility: OTHER | Age: 63
End: 2023-02-27
Payer: COMMERCIAL

## 2023-02-27 DIAGNOSIS — L74.0 HEAT RASH: Primary | ICD-10-CM

## 2023-02-28 RX ORDER — MOMETASONE FUROATE 1 MG/G
OINTMENT TOPICAL DAILY
Qty: 45 G | Refills: 1 | Status: SHIPPED | OUTPATIENT
Start: 2023-02-28 | End: 2023-04-14

## 2023-04-14 ENCOUNTER — OFFICE VISIT (OUTPATIENT)
Dept: FAMILY MEDICINE | Facility: OTHER | Age: 63
End: 2023-04-14
Attending: FAMILY MEDICINE
Payer: COMMERCIAL

## 2023-04-14 VITALS
HEART RATE: 67 BPM | OXYGEN SATURATION: 96 % | SYSTOLIC BLOOD PRESSURE: 138 MMHG | RESPIRATION RATE: 18 BRPM | WEIGHT: 286.8 LBS | HEIGHT: 70 IN | BODY MASS INDEX: 41.06 KG/M2 | TEMPERATURE: 97.5 F | DIASTOLIC BLOOD PRESSURE: 84 MMHG

## 2023-04-14 DIAGNOSIS — G60.9 CHRONIC IDIOPATHIC AXONAL POLYNEUROPATHY: Primary | ICD-10-CM

## 2023-04-14 DIAGNOSIS — J45.20 MILD INTERMITTENT ASTHMA WITHOUT COMPLICATION: ICD-10-CM

## 2023-04-14 DIAGNOSIS — F11.90 CHRONIC, CONTINUOUS USE OF OPIOIDS: ICD-10-CM

## 2023-04-14 DIAGNOSIS — E11.9 TYPE 2 DIABETES MELLITUS WITHOUT COMPLICATION, WITHOUT LONG-TERM CURRENT USE OF INSULIN (H): ICD-10-CM

## 2023-04-14 LAB — HBA1C MFR BLD: 6.6 % (ref 4–6.2)

## 2023-04-14 PROCEDURE — 99214 OFFICE O/P EST MOD 30 MIN: CPT | Performed by: FAMILY MEDICINE

## 2023-04-14 PROCEDURE — 83036 HEMOGLOBIN GLYCOSYLATED A1C: CPT | Mod: ZL | Performed by: FAMILY MEDICINE

## 2023-04-14 PROCEDURE — 36415 COLL VENOUS BLD VENIPUNCTURE: CPT | Mod: ZL | Performed by: FAMILY MEDICINE

## 2023-04-14 RX ORDER — OXYCODONE HYDROCHLORIDE 10 MG/1
10 TABLET ORAL EVERY 4 HOURS PRN
Qty: 180 TABLET | Refills: 0 | Status: SHIPPED | OUTPATIENT
Start: 2023-06-02 | End: 2023-08-02

## 2023-04-14 RX ORDER — OXYCODONE HYDROCHLORIDE 10 MG/1
10 TABLET ORAL EVERY 4 HOURS PRN
Qty: 180 TABLET | Refills: 0 | Status: SHIPPED | OUTPATIENT
Start: 2023-04-14 | End: 2023-08-02

## 2023-04-14 RX ORDER — ALBUTEROL SULFATE 90 UG/1
2 AEROSOL, METERED RESPIRATORY (INHALATION) EVERY 4 HOURS PRN
Qty: 18 G | Refills: 4 | Status: SHIPPED | OUTPATIENT
Start: 2023-04-14

## 2023-04-14 RX ORDER — OXYCODONE HYDROCHLORIDE 10 MG/1
10 TABLET ORAL EVERY 4 HOURS PRN
Qty: 180 TABLET | Refills: 0 | Status: SHIPPED | OUTPATIENT
Start: 2023-05-12 | End: 2023-08-02

## 2023-04-14 ASSESSMENT — PAIN SCALES - GENERAL: PAINLEVEL: SEVERE PAIN (7)

## 2023-04-14 ASSESSMENT — ASTHMA QUESTIONNAIRES
QUESTION_1 LAST FOUR WEEKS HOW MUCH OF THE TIME DID YOUR ASTHMA KEEP YOU FROM GETTING AS MUCH DONE AT WORK, SCHOOL OR AT HOME: NONE OF THE TIME
ACT_TOTALSCORE: 18
QUESTION_5 LAST FOUR WEEKS HOW WOULD YOU RATE YOUR ASTHMA CONTROL: WELL CONTROLLED
QUESTION_4 LAST FOUR WEEKS HOW OFTEN HAVE YOU USED YOUR RESCUE INHALER OR NEBULIZER MEDICATION (SUCH AS ALBUTEROL): ONE OR TWO TIMES PER DAY
QUESTION_3 LAST FOUR WEEKS HOW OFTEN DID YOUR ASTHMA SYMPTOMS (WHEEZING, COUGHING, SHORTNESS OF BREATH, CHEST TIGHTNESS OR PAIN) WAKE YOU UP AT NIGHT OR EARLIER THAN USUAL IN THE MORNING: NOT AT ALL
ACT_TOTALSCORE: 18
QUESTION_2 LAST FOUR WEEKS HOW OFTEN HAVE YOU HAD SHORTNESS OF BREATH: ONCE A DAY

## 2023-04-14 ASSESSMENT — PATIENT HEALTH QUESTIONNAIRE - PHQ9
SUM OF ALL RESPONSES TO PHQ QUESTIONS 1-9: 4
SUM OF ALL RESPONSES TO PHQ QUESTIONS 1-9: 4

## 2023-04-14 NOTE — NURSING NOTE
"Chief Complaint   Patient presents with     Recheck Medication       Initial /84   Pulse 67   Temp 97.5  F (36.4  C) (Tympanic)   Resp 18   Ht 1.778 m (5' 10\")   Wt 130.1 kg (286 lb 12.8 oz)   SpO2 96%   BMI 41.15 kg/m   Estimated body mass index is 41.15 kg/m  as calculated from the following:    Height as of this encounter: 1.778 m (5' 10\").    Weight as of this encounter: 130.1 kg (286 lb 12.8 oz).  Medication Reconciliation: complete    FOOD SECURITY SCREENING QUESTIONS  Hunger Vital Signs:  Within the past 12 months we worried whether our food would run out before we got money to buy more. Never  Within the past 12 months the food we bought just didn't last and we didn't have money to get more. Never  Josefina Prater LPN 4/14/2023 1:11 PM        "

## 2023-04-14 NOTE — PROGRESS NOTES
"  Assessment & Plan     (G60.9) Chronic idiopathic axonal polyneuropathy  (primary encounter diagnosis)  Comment: very challenging to manage. Is on opiates, has fialed lyrica, gabapentin, THC, topicals.  reviewed and stable  Plan: oxyCODONE IR (ROXICODONE) 10 MG tablet,         oxyCODONE IR (ROXICODONE) 10 MG tablet,         oxyCODONE IR (ROXICODONE) 10 MG tablet        Refilled for 3 months    (J45.20) Mild intermittent asthma without complication  Comment: stable  Plan: albuterol (PROAIR HFA/PROVENTIL HFA/VENTOLIN         HFA) 108 (90 Base) MCG/ACT inhaler             (F11.90) F11.9 - Chronic, continuous use of opioids  Comment:    Plan:      (E11.9) Type 2 diabetes mellitus without complication, without long-term current use of insulin (H)  Comment: mild but will treat aggressively per his request.  Plan: Hemoglobin A1c, metFORMIN (GLUCOPHAGE) 1000 MG         tablet, FOOT EXAM                        BMI:   Estimated body mass index is 41.15 kg/m  as calculated from the following:    Height as of this encounter: 1.778 m (5' 10\").    Weight as of this encounter: 130.1 kg (286 lb 12.8 oz).           Return in about 3 months (around 7/14/2023).    Kaiden Chavez MD  [facility]    Ivett Rajput is a 62 year old, presenting for the following health issues:  Recheck Medication        4/14/2023     1:07 PM   Additional Questions   Roomed by GLENN Rocha   Accompanied by Self         4/14/2023     1:07 PM   Patient Reported Additional Medications   Patient reports taking the following new medications N/A     History of Present Illness       Reason for visit:  Script renewal    He eats 2-3 servings of fruits and vegetables daily.He consumes 0 sweetened beverage(s) daily.He exercises with enough effort to increase his heart rate 10 to 19 minutes per day.  He exercises with enough effort to increase his heart rate 3 or less days per week.   He is taking medications regularly.    Today's PHQ-9     "     PHQ-9 Total Score: 4    PHQ-9 Q9 Thoughts of better off dead/self-harm past 2 weeks :   Not at all           Pain History:  When did you first notice your pain? Chronic   Have you seen this provider for your pain in the past?   Yes   Where in your body do you have pain? Bilateral feet  Are you seeing anyone else for your pain? No        11/15/2022     8:56 AM 2/6/2023    11:13 AM 4/14/2023    12:57 PM   PHQ-9 SCORE   PHQ-9 Total Score MyChart  0 4 (Minimal depression)   PHQ-9 Total Score 0 0 4           6/16/2022    12:59 PM 11/15/2022     8:56 AM 2/6/2023    11:13 AM   NEFTALY-7 SCORE   Total Score 1 (minimal anxiety)  0 (minimal anxiety)   Total Score 1 0 0           1/26/2022    11:30 AM 2/6/2023    11:19 AM 4/14/2023     1:12 PM   PEG Score   PEG Total Score 9 7 4           1/26/2022    11:30 AM 2/6/2023    11:19 AM 4/14/2023     1:12 PM   PEG Score   PEG Total Score 9 7 4       Chronic Pain Follow Up:    Location of pain: feet  Analgesia/pain control:    - Recent changes:  no    - Overall control: Inadequate pain control. Nighttime is especially bad.   - Current treatments: oxycodone    Adherence:     - Do you ever take more pain medicine than prescribed? No    - When did you take your last dose of pain medicine?  today   Adverse effects: No   PDMP Review       Value Time User    State PDMP site checked  Yes 4/14/2023  1:30 PM Kaiden Chavez MD        Last CSA Agreement:   CSA -- Patient Level:     [Media Unavailable] Controlled Substance Agreement - Opioid - Scan on 1/26/2022 12:05 PM: opiod       Last UDS: 6/22/2022              He wants to treat his diabetes aggressively to see if this might help the nerve pain a bit more. Hands are getting weaker and more sore too.    Some wheezing in the spring time, began a few days ago. Wife had COVID as well, he tested negative but thinks he might have had a mild case. Wants a refill on his MDI    Current Outpatient Medications   Medication     albuterol (PROAIR  "HFA/PROVENTIL HFA/VENTOLIN HFA) 108 (90 Base) MCG/ACT inhaler     atorvastatin (LIPITOR) 40 MG tablet     metFORMIN (GLUCOPHAGE) 1000 MG tablet     mirtazapine (REMERON) 30 MG tablet     omeprazole (PRILOSEC) 40 MG DR capsule     order for DME     oxyCODONE IR (ROXICODONE) 10 MG tablet     [START ON 6/2/2023] oxyCODONE IR (ROXICODONE) 10 MG tablet     [START ON 5/12/2023] oxyCODONE IR (ROXICODONE) 10 MG tablet     naloxone (NARCAN) 4 MG/0.1ML nasal spray     No current facility-administered medications for this visit.     Recent Labs   Lab Test 11/10/22  1632 07/22/22  1405 11/02/21  1059 01/07/20  0839 12/04/19  0920 11/12/19  1331 11/12/19  1331 04/17/18  1104   A1C 6.5* 6.6*  --   --   --   --  6.1*  --    LDL  --   --  155* 146* Cannot estimate LDL when triglyceride exceeds 400 mg/dL   < > Cannot estimate LDL when triglyceride exceeds 400 mg/dL  --    HDL 37*  --  42 40 36   < > 36  --    TRIG 567*  --  260* 326* 630*   < > 903*  --    ALT  --   --  24  --   --   --   --  44   CR 0.97  --  0.88  --   --   --   --  0.94   GFRESTIMATED 89  --  >90  --   --   --   --  83   GFRESTBLACK  --   --   --   --   --   --   --  >90   POTASSIUM 4.6  --  4.4  --   --   --   --  4.2    < > = values in this interval not displayed.            Review of Systems         Objective    /84   Pulse 67   Temp 97.5  F (36.4  C) (Tympanic)   Resp 18   Ht 1.778 m (5' 10\")   Wt 130.1 kg (286 lb 12.8 oz)   SpO2 96%   BMI 41.15 kg/m    Body mass index is 41.15 kg/m .  Physical Exam  Constitutional:       Appearance: Normal appearance.   Cardiovascular:      Rate and Rhythm: Normal rate and regular rhythm.   Pulmonary:      Effort: Pulmonary effort is normal. No respiratory distress.   Neurological:      Mental Status: He is alert.   Psychiatric:         Mood and Affect: Mood normal.         Behavior: Behavior normal.         Thought Content: Thought content normal.        Sensory exam of the foot is abnormal to mid tibia in " stocking like distribution, tested with the monofilament. Good pulses, no lesions or ulcers, good peripheral pulses.        Results for orders placed or performed in visit on 04/14/23   Hemoglobin A1c     Status: Abnormal   Result Value Ref Range    Hemoglobin A1C 6.6 (H) 4.0 - 6.2 %

## 2023-04-14 NOTE — LETTER
Opioid / Opioid Plus Controlled Substance Agreement    This is an agreement between you and your provider about the safe and appropriate use of controlled substance/opioids prescribed by your care team. Controlled substances are medicines that can cause physical and mental dependence (abuse).    There are strict laws about having and using these medicines. We here at [facility] are committing to working with you in your efforts to get better. To support you in this work, we ll help you schedule regular office appointments for medicine refills. If we must cancel or change your appointment for any reason, we ll make sure you have enough medicine to last until your next appointment.     As a Provider, I will:  Listen carefully to your concerns and treat you with respect.   Recommend a treatment plan that I believe is in your best interest. This plan may involve therapies other than opioid pain medication.   Talk with you often about the possible benefits, and the risk of harm of any medicine that we prescribe for you.   Provide a plan on how to taper (discontinue or go off) using this medicine if the decision is made to stop its use.    As a Patient, I understand that opioid(s):   Are a controlled substance prescribed by my care team to help me function or work and manage my condition(s).   Are strong medicines and can cause serious side effects such as:  Drowsiness, which can seriously affect my driving ability  A lower breathing rate, enough to cause death  Harm to my thinking ability   Depression   Abuse of and addiction to this medicine  Need to be taken exactly as prescribed. Combining opioids with certain medicines or chemicals (such as illegal drugs, sedatives, sleeping pills, and benzodiazepines) can be dangerous or even fatal. If I stop opioids suddenly, I may have severe withdrawal symptoms.  Do not work for all types of pain nor for all patients. If they re not helpful, I may be asked to stop  them.        The risks, benefits and side effects of these medicine(s) were explained to me. I agree that:  I will take part in other treatments as advised by my care team. This may be psychiatry or counseling, physical therapy, behavioral therapy, group treatment or a referral to a specialist.     I will keep all my appointments. I understand that this is part of the monitoring of opioids. My care team may require an office visit for EVERY opioid/controlled substance refill. If I miss appointments or don t follow instructions, my care team may stop my medicine.    I will take my medicines as prescribed. I will not change the dose or schedule unless my care team tells me to. There will be no refills if I run out early.     I may be asked to come to the clinic and complete a urine drug test or complete a pill count at any time. If I don t give a urine sample or participate in a pill count, the care team may stop my medicine.    I will only receive prescriptions from this clinic for chronic pain. If I am treated by another provider for acute pain issues, I will tell them that I am taking opioid pain medication for chronic pain and that I have a treatment agreement with this provider. I will inform my [facility] care team within one business day if I am given a prescription for any pain medication by another healthcare provider. My [facility] care team can contact other providers and pharmacists about my use of any medicines.    It is up to me to make sure that I don t run out of my medicines on weekends or holidays. If my care team is willing to refill my opioid prescription without a visit, I must request refills only during office hours. Refills may take up to 3 business days to process. I will use one pharmacy to fill all my opioid and other controlled substance prescriptions. I will notify the clinic about any changes to my insurance or medication availability.    I am responsible for my  prescriptions. If the medicine/prescription is lost, stolen or destroyed, it will not be replaced. I also agree not to share controlled substance medicines with anyone.    I am aware I should not use any illegal or recreational drugs. I agree not to drink alcohol unless my care team says I can.       If I enroll in the [place] [facility] program, I will tell my care team prior to my next refill.     I will tell my care team right away if I become pregnant, have a new medical problem treated outside of my regular clinic, or have a change in my medications.    I understand that this medicine can affect my thinking, judgment and reaction time. Alcohol and drugs affect the brain and body, which can affect the safety of my driving. Being under the influence of alcohol or drugs can affect my decision-making, behaviors, personal safety, and the safety of others. Driving while impaired (DWI) can occur if a person is driving, operating, or in physical control of a car, motorcycle, boat, snowmobile, ATV, motorbike, off-road vehicle, or any other motor vehicle ([place] Statute 169A.20). I understand the risk if I choose to drive or operate any vehicle or machinery.    I understand that if I do not follow any of the conditions above, my prescriptions or treatment may be stopped or changed.          Opioids  What You Need to Know    What are opioids?   Opioids are pain medicines that must be prescribed by a doctor. They are also known as narcotics.     Examples are:   morphine (MS Contin, Maria Dolores)  oxycodone (Oxycontin)  oxycodone and acetaminophen (Percocet)  hydrocodone and acetaminophen (Vicodin, Norco)   fentanyl patch (Duragesic)   hydromorphone (Dilaudid)   methadone  codeine (Tylenol #3)     What do opioids do well?   Opioids are best for severe short-term pain such as after a surgery or injury. They may work well for cancer pain. They may help some people with long-lasting (chronic) pain.     What do opioids NOT do  well?   Opioids never get rid of pain entirely, and they don t work well for most patients with chronic pain. Opioids don t reduce swelling, one of the causes of pain.                                    Other ways to manage chronic pain and improve function include:     Treat the health problem that may be causing pain  Anti-inflammation medicines, which reduce swelling and tenderness, such as ibuprofen (Advil, Motrin) or naproxen (Aleve)  Acetaminophen (Tylenol)  Antidepressants and anti-seizure medicines, especially for nerve pain  Topical treatments such as patches or creams  Injections or nerve blocks  Chiropractic or osteopathic treatment  Acupuncture, massage, deep breathing, meditation, visual imagery, aromatherapy  Use heat or ice at the pain site  Physical therapy   Exercise  Stop smoking  Take part in therapy       Risks and side effects     Talk to your doctor before you start or decide to keep taking opioids. Possible side effects include:    Lowering your breathing rate enough to cause death  Overdose, including death, especially if taking higher than prescribed doses  Worse depression symptoms; less pleasure in things you usually enjoy  Feeling tired or sluggish  Slower thoughts or cloudy thinking  Being more sensitive to pain over time; pain is harder to control  Trouble sleeping or restless sleep  Changes in hormone levels (for example, less testosterone)  Changes in sex drive or ability to have sex  Constipation  Unsafe driving  Itching and sweating  Dizziness  Nausea, throwing up and dry mouth    What else should I know about opioids?    Opioids may lead to dependence, tolerance, or addiction.    Dependence means that if you stop or reduce the medicine too quickly, you will have withdrawal symptoms. These include loose poop (diarrhea), jitters, flu-like symptoms, nervousness and tremors. Dependence is not the same as addiction.                     Tolerance means needing higher doses over time to  get the same effect. This may increase the chance of serious side effects.    Addiction is when people improperly use a substance that harms their body, their mind or their relations with others. Use of opiates can cause a relapse of addiction if you have a history of drug or alcohol abuse.    People who have used opioids for a long time may have a lower quality of life, worse depression, higher levels of pain and more visits to doctors.    You can overdose on opioids. Take these steps to lower your risk of overdose:    Recognize the signs:  Signs of overdose include decrease or loss of consciousness (blackout), slowed breathing, trouble waking up and blue lips. If someone is worried about overdose, they should call 911.    Talk to your doctor about Narcan (naloxone).   If you are at risk for overdose, you may be given a prescription for Narcan. This medicine very quickly reverses the effects of opioids.   If you overdose, a friend or family member can give you Narcan while waiting for the ambulance. They need to know the signs of overdose and how to give Narcan.     Don't use alcohol or street drugs.   Taking them with opioids can cause death.    Do not take any of these medicines unless your doctor says it s OK. Taking these with opioids can cause death:  Benzodiazepines, such as lorazepam (Ativan), alprazolam (Xanax) or diazepam (Valium)  Muscle relaxers, such as cyclobenzaprine (Flexeril)  Sleeping pills like zolpidem (Ambien)   Other opioids      How to keep you and other people safe while taking opioids:    Never share your opioids with others.  Opioid medicines are regulated by the Drug Enforcement Agency (DEEPTI). Selling or sharing medications is a criminal act.    2. Be sure to store opioids in a secure place, locked up if possible. Young children can easily swallow them and overdose.    3. When you are traveling with your medicines, keep them in the original bottles. If you use a pill box, be sure you also  carry a copy of your medicine list from your clinic or pharmacy.    4. Safe disposal of opioids    Most pharmacies have places to get rid of medicine, called disposal kiosks. Medicine disposal options are also available in every [facility]. Search your county and  medication disposal  to find more options. You can find more details at:  [email or web address]-[email or web address]-[email or web address]-medications     I agree that my provider, clinic care team, and pharmacy may work with any city, state or federal law enforcement agency that investigates the misuse, sale, or other diversion of my controlled medicine. I will allow my provider to discuss my care with, or share a copy of, this agreement with any other treating provider, pharmacy or emergency room where I receive care.    I have read this agreement and have asked questions about anything I did not understand.    _______________________________________________________  Patient Signature - Regulo Roth _____________________                   Date     _______________________________________________________  Provider Signature - Kaiden Chavez MD   _____________________                   Date     _______________________________________________________  Witness Signature (required if provider not present while patient signing)   _____________________                   Date

## 2023-04-15 ENCOUNTER — MYC MEDICAL ADVICE (OUTPATIENT)
Dept: FAMILY MEDICINE | Facility: OTHER | Age: 63
End: 2023-04-15
Payer: COMMERCIAL

## 2023-04-15 DIAGNOSIS — E11.9 TYPE 2 DIABETES MELLITUS WITHOUT COMPLICATION, WITHOUT LONG-TERM CURRENT USE OF INSULIN (H): Primary | ICD-10-CM

## 2023-04-18 ENCOUNTER — TRANSFERRED RECORDS (OUTPATIENT)
Dept: HEALTH INFORMATION MANAGEMENT | Facility: OTHER | Age: 63
End: 2023-04-18
Payer: COMMERCIAL

## 2023-07-07 DIAGNOSIS — K21.9 GASTROESOPHAGEAL REFLUX DISEASE, UNSPECIFIED WHETHER ESOPHAGITIS PRESENT: ICD-10-CM

## 2023-07-07 DIAGNOSIS — E78.1 PURE HYPERGLYCERIDEMIA: ICD-10-CM

## 2023-07-09 RX ORDER — OMEPRAZOLE 40 MG/1
40 CAPSULE, DELAYED RELEASE ORAL DAILY
Qty: 90 CAPSULE | Refills: 4 | Status: SHIPPED | OUTPATIENT
Start: 2023-07-09 | End: 2024-02-02

## 2023-07-09 RX ORDER — ATORVASTATIN CALCIUM 40 MG/1
40 TABLET, FILM COATED ORAL DAILY
Qty: 90 TABLET | Refills: 4 | Status: SHIPPED | OUTPATIENT
Start: 2023-07-09 | End: 2024-07-31

## 2023-07-11 ENCOUNTER — TRANSFERRED RECORDS (OUTPATIENT)
Dept: HEALTH INFORMATION MANAGEMENT | Facility: OTHER | Age: 63
End: 2023-07-11

## 2023-07-19 ENCOUNTER — TELEPHONE (OUTPATIENT)
Dept: FAMILY MEDICINE | Facility: OTHER | Age: 63
End: 2023-07-19
Payer: COMMERCIAL

## 2023-07-19 NOTE — TELEPHONE ENCOUNTER
Please call with a work in.  Early August for 3 month med. check  With PCP.      Josefina Iniguez on 7/19/2023 at 11:24 AM

## 2023-07-20 ENCOUNTER — TRANSFERRED RECORDS (OUTPATIENT)
Dept: HEALTH INFORMATION MANAGEMENT | Facility: OTHER | Age: 63
End: 2023-07-20
Payer: COMMERCIAL

## 2023-08-02 ENCOUNTER — OFFICE VISIT (OUTPATIENT)
Dept: FAMILY MEDICINE | Facility: OTHER | Age: 63
End: 2023-08-02
Attending: FAMILY MEDICINE
Payer: COMMERCIAL

## 2023-08-02 VITALS
WEIGHT: 293 LBS | OXYGEN SATURATION: 96 % | BODY MASS INDEX: 42.04 KG/M2 | SYSTOLIC BLOOD PRESSURE: 132 MMHG | RESPIRATION RATE: 16 BRPM | TEMPERATURE: 97.5 F | DIASTOLIC BLOOD PRESSURE: 82 MMHG | HEART RATE: 69 BPM

## 2023-08-02 DIAGNOSIS — G60.9 CHRONIC IDIOPATHIC AXONAL POLYNEUROPATHY: Primary | ICD-10-CM

## 2023-08-02 DIAGNOSIS — E11.9 TYPE 2 DIABETES MELLITUS WITHOUT COMPLICATION, WITHOUT LONG-TERM CURRENT USE OF INSULIN (H): ICD-10-CM

## 2023-08-02 LAB
CREAT UR-MCNC: 179 MG/DL
HBA1C MFR BLD: 6.9 % (ref 4–6.2)

## 2023-08-02 PROCEDURE — 83036 HEMOGLOBIN GLYCOSYLATED A1C: CPT | Mod: ZL | Performed by: FAMILY MEDICINE

## 2023-08-02 PROCEDURE — 80357 KETAMINE AND NORKETAMINE: CPT | Mod: ZL | Performed by: FAMILY MEDICINE

## 2023-08-02 PROCEDURE — 80353 DRUG SCREENING COCAINE: CPT | Mod: ZL | Performed by: FAMILY MEDICINE

## 2023-08-02 PROCEDURE — 80346 BENZODIAZEPINES1-12: CPT | Mod: ZL | Performed by: FAMILY MEDICINE

## 2023-08-02 PROCEDURE — 99213 OFFICE O/P EST LOW 20 MIN: CPT | Performed by: FAMILY MEDICINE

## 2023-08-02 PROCEDURE — 36415 COLL VENOUS BLD VENIPUNCTURE: CPT | Mod: ZL | Performed by: FAMILY MEDICINE

## 2023-08-02 PROCEDURE — 80363 OPIOIDS & OPIATE ANALOGS 3/4: CPT | Mod: ZL | Performed by: FAMILY MEDICINE

## 2023-08-02 RX ORDER — OXYCODONE HYDROCHLORIDE 10 MG/1
10 TABLET ORAL EVERY 4 HOURS PRN
Qty: 180 TABLET | Refills: 0 | Status: SHIPPED | OUTPATIENT
Start: 2023-08-02 | End: 2023-11-02

## 2023-08-02 RX ORDER — OXYCODONE HYDROCHLORIDE 10 MG/1
10 TABLET ORAL EVERY 4 HOURS PRN
Qty: 180 TABLET | Refills: 0 | Status: SHIPPED | OUTPATIENT
Start: 2023-10-04 | End: 2023-11-02

## 2023-08-02 RX ORDER — OXYCODONE HYDROCHLORIDE 10 MG/1
10 TABLET ORAL EVERY 4 HOURS PRN
Qty: 180 TABLET | Refills: 0 | Status: SHIPPED | OUTPATIENT
Start: 2023-09-06 | End: 2023-11-02

## 2023-08-02 ASSESSMENT — PAIN SCALES - GENERAL: PAINLEVEL: SEVERE PAIN (7)

## 2023-08-02 ASSESSMENT — ANXIETY QUESTIONNAIRES
1. FEELING NERVOUS, ANXIOUS, OR ON EDGE: NOT AT ALL
6. BECOMING EASILY ANNOYED OR IRRITABLE: NOT AT ALL
5. BEING SO RESTLESS THAT IT IS HARD TO SIT STILL: NOT AT ALL
GAD7 TOTAL SCORE: 0
3. WORRYING TOO MUCH ABOUT DIFFERENT THINGS: NOT AT ALL
2. NOT BEING ABLE TO STOP OR CONTROL WORRYING: NOT AT ALL
7. FEELING AFRAID AS IF SOMETHING AWFUL MIGHT HAPPEN: NOT AT ALL
4. TROUBLE RELAXING: NOT AT ALL
IF YOU CHECKED OFF ANY PROBLEMS ON THIS QUESTIONNAIRE, HOW DIFFICULT HAVE THESE PROBLEMS MADE IT FOR YOU TO DO YOUR WORK, TAKE CARE OF THINGS AT HOME, OR GET ALONG WITH OTHER PEOPLE: NOT DIFFICULT AT ALL
GAD7 TOTAL SCORE: 0

## 2023-08-02 ASSESSMENT — ASTHMA QUESTIONNAIRES: ACT_TOTALSCORE: 25

## 2023-08-02 ASSESSMENT — PATIENT HEALTH QUESTIONNAIRE - PHQ9
SUM OF ALL RESPONSES TO PHQ QUESTIONS 1-9: 3
10. IF YOU CHECKED OFF ANY PROBLEMS, HOW DIFFICULT HAVE THESE PROBLEMS MADE IT FOR YOU TO DO YOUR WORK, TAKE CARE OF THINGS AT HOME, OR GET ALONG WITH OTHER PEOPLE: NOT DIFFICULT AT ALL
SUM OF ALL RESPONSES TO PHQ QUESTIONS 1-9: 3

## 2023-08-02 NOTE — NURSING NOTE
"Chief Complaint   Patient presents with    Recheck Medication       Initial /82   Pulse 69   Temp 97.5  F (36.4  C) (Tympanic)   Resp 16   Wt 132.9 kg (293 lb)   SpO2 96%   BMI 42.04 kg/m   Estimated body mass index is 42.04 kg/m  as calculated from the following:    Height as of 4/14/23: 1.778 m (5' 10\").    Weight as of this encounter: 132.9 kg (293 lb).  Medication Reconciliation: complete    FOOD SECURITY SCREENING QUESTIONS  Hunger Vital Signs:  Within the past 12 months we worried whether our food would run out before we got money to buy more. Never  Within the past 12 months the food we bought just didn't last and we didn't have money to get more. Never  Josefina Prater LPN 8/2/2023 9:21 AM        "

## 2023-08-02 NOTE — PROGRESS NOTES
"  Assessment & Plan     (G60.9) Chronic idiopathic axonal polyneuropathy  (primary encounter diagnosis)  Comment: slowly progressing, as expected.  reviewed, stable. Low risk for misuse or diversion  Plan: oxyCODONE IR (ROXICODONE) 10 MG tablet,         oxyCODONE IR (ROXICODONE) 10 MG tablet,         oxyCODONE IR (ROXICODONE) 10 MG tablet, Drug         Confirmation Panel Urine with Creat, FOOT EXAM             (E11.9) Type 2 diabetes mellitus without complication, without long-term current use of insulin (H)  Comment: very mild. Has had med side effects, and for now he prefers to not use meds. Victoza or Ozempic would be the next option for him, would treat his obesity as well. Discussed with him diet and exercise.  Plan: Hemoglobin A1c, FOOT EXAM                      BMI:   Estimated body mass index is 42.04 kg/m  as calculated from the following:    Height as of 4/14/23: 1.778 m (5' 10\").    Weight as of this encounter: 132.9 kg (293 lb).           Return in about 3 months (around 11/2/2023).    Kaiden Chavez MD  [facility]    Ivett Rajput is a 63 year old, presenting for the following health issues:  Recheck Medication      8/2/2023     9:15 AM   Additional Questions   Roomed by GLENN Rocha   Accompanied by Self         8/2/2023     9:15 AM   Patient Reported Additional Medications   Patient reports taking the following new medications N/A       History of Present Illness       Reason for visit:  Meds refill    He eats 2-3 servings of fruits and vegetables daily.He consumes 0 sweetened beverage(s) daily.He exercises with enough effort to increase his heart rate 20 to 29 minutes per day.  He exercises with enough effort to increase his heart rate 5 days per week.   He is taking medications regularly.       Pain History:  When did you first notice your pain? Chronic   Have you seen this provider for your pain in the past? Yes   Where in your body do you have pain? Bilateral feet  Are " you seeing anyone else for your pain? No        2/6/2023    11:13 AM 4/14/2023    12:57 PM 8/2/2023     9:13 AM   PHQ-9 SCORE   PHQ-9 Total Score MyChart 0 4 (Minimal depression) 3 (Minimal depression)   PHQ-9 Total Score 0 4 3           11/15/2022     8:56 AM 2/6/2023    11:13 AM 8/2/2023     9:15 AM   NEFTALY-7 SCORE   Total Score  0 (minimal anxiety) 0 (minimal anxiety)   Total Score 0 0 0           2/6/2023    11:19 AM 4/14/2023     1:12 PM 8/2/2023     9:18 AM   PEG Score   PEG Total Score 7 4 5.33           2/6/2023    11:19 AM 4/14/2023     1:12 PM 8/2/2023     9:18 AM   PEG Score   PEG Total Score 7 4 5.33       Chronic Pain Follow Up:    Location of pain: feet and hands  Analgesia/pain control:    - Recent changes:  worse with metformin    - Overall control: Tolerable with discomfort    - Current treatments: THC medically   Adherence:     - Do you ever take more pain medicine than prescribed? No    - When did you take your last dose of pain medicine?  today   Adverse effects: No   PDMP Review         Value Time User    State PDMP site checked  Yes 4/14/2023  1:30 PM Kaiden Chavez MD          Last CSA Agreement:   CSA -- Patient Level:     [Media Unavailable] Controlled Substance Agreement - Opioid - Scan on 4/14/2023  2:07 PM   [Media Unavailable] Controlled Substance Agreement - Opioid - Scan on 1/26/2022 12:05 PM: opiod       Last UDS: 6/22/2022          New diagnosis of diabetes too, and had a flare in his neuropathy with starting metformin.was given Jardiance, but read the side effects and feared a perineum infection so did not start it.    Recent Labs   Lab Test 04/14/23  1355 11/10/22  1632 07/22/22  1405 11/02/21  1059 01/07/20  0839 12/04/19  0920 11/12/19  1331 04/17/18  1104   A1C 6.6* 6.5* 6.6*  --   --   --    < >  --    LDL  --   --   --  155* 146* Cannot estimate LDL when triglyceride exceeds 400 mg/dL   < >  --    HDL  --  37*  --  42 40 36   < >  --    TRIG  --  567*  --  260* 326* 630*   < >   --    ALT  --   --   --  24  --   --   --  44   CR  --  0.97  --  0.88  --   --   --  0.94   GFRESTIMATED  --  89  --  >90  --   --   --  83   GFRESTBLACK  --   --   --   --   --   --   --  >90   POTASSIUM  --  4.6  --  4.4  --   --   --  4.2    < > = values in this interval not displayed.                    Review of Systems         Objective    /82   Pulse 69   Temp 97.5  F (36.4  C) (Tympanic)   Resp 16   Wt 132.9 kg (293 lb)   SpO2 96%   BMI 42.04 kg/m    Body mass index is 42.04 kg/m .  Physical Exam  Constitutional:       Appearance: Normal appearance. He is obese.   Cardiovascular:      Rate and Rhythm: Normal rate.   Pulmonary:      Effort: Pulmonary effort is normal. No respiratory distress.      Breath sounds: No stridor.   Neurological:      General: No focal deficit present.      Mental Status: He is alert and oriented to person, place, and time.   Psychiatric:         Mood and Affect: Mood normal.         Behavior: Behavior normal.         Thought Content: Thought content normal.        Sensory exam of the foot is abnormal to mid foot, tested with the monofilament. Good pulses, no lesions or ulcers, good peripheral pulses.        Results for orders placed or performed in visit on 08/02/23   Hemoglobin A1c     Status: Abnormal   Result Value Ref Range    Hemoglobin A1C 6.9 (H) 4.0 - 6.2 %   Urine Creatinine for Drug Screen Panel     Status: None   Result Value Ref Range    Creatinine Urine for Drug Screen 179 mg/dL   Drug Confirmation Panel Urine with Creat     Status: None (In process)    Narrative    The following orders were created for panel order Drug Confirmation Panel Urine with Creat.  Procedure                               Abnormality         Status                     ---------                               -----------         ------                     Urine Drug Confirmation ...[[id number]]                      In process                 Urine Creatinine for Jaime...[[id number]]                       Final result                 Please view results for these tests on the individual orders.

## 2023-08-04 LAB
OXYCODONE UR CFM-MCNC: 7120 NG/ML
OXYCODONE/CREAT UR: 3978 NG/MG {CREAT}
OXYMORPHONE UR CFM-MCNC: 140 NG/ML
OXYMORPHONE/CREAT UR: 78 NG/MG {CREAT}

## 2023-08-15 ENCOUNTER — TRANSFERRED RECORDS (OUTPATIENT)
Dept: HEALTH INFORMATION MANAGEMENT | Facility: OTHER | Age: 63
End: 2023-08-15
Payer: COMMERCIAL

## 2023-09-02 ENCOUNTER — HEALTH MAINTENANCE LETTER (OUTPATIENT)
Age: 63
End: 2023-09-02

## 2023-10-29 ASSESSMENT — ANXIETY QUESTIONNAIRES
GAD7 TOTAL SCORE: 0
IF YOU CHECKED OFF ANY PROBLEMS ON THIS QUESTIONNAIRE, HOW DIFFICULT HAVE THESE PROBLEMS MADE IT FOR YOU TO DO YOUR WORK, TAKE CARE OF THINGS AT HOME, OR GET ALONG WITH OTHER PEOPLE: NOT DIFFICULT AT ALL
GAD7 TOTAL SCORE: 0
5. BEING SO RESTLESS THAT IT IS HARD TO SIT STILL: NOT AT ALL
7. FEELING AFRAID AS IF SOMETHING AWFUL MIGHT HAPPEN: NOT AT ALL
2. NOT BEING ABLE TO STOP OR CONTROL WORRYING: NOT AT ALL
3. WORRYING TOO MUCH ABOUT DIFFERENT THINGS: NOT AT ALL
4. TROUBLE RELAXING: NOT AT ALL
6. BECOMING EASILY ANNOYED OR IRRITABLE: NOT AT ALL
1. FEELING NERVOUS, ANXIOUS, OR ON EDGE: NOT AT ALL

## 2023-10-29 ASSESSMENT — ASTHMA QUESTIONNAIRES: ACT_TOTALSCORE: 25

## 2023-11-01 ASSESSMENT — PATIENT HEALTH QUESTIONNAIRE - PHQ9
SUM OF ALL RESPONSES TO PHQ QUESTIONS 1-9: 2
10. IF YOU CHECKED OFF ANY PROBLEMS, HOW DIFFICULT HAVE THESE PROBLEMS MADE IT FOR YOU TO DO YOUR WORK, TAKE CARE OF THINGS AT HOME, OR GET ALONG WITH OTHER PEOPLE: SOMEWHAT DIFFICULT
SUM OF ALL RESPONSES TO PHQ QUESTIONS 1-9: 2

## 2023-11-02 ENCOUNTER — OFFICE VISIT (OUTPATIENT)
Dept: FAMILY MEDICINE | Facility: OTHER | Age: 63
End: 2023-11-02
Attending: FAMILY MEDICINE
Payer: COMMERCIAL

## 2023-11-02 VITALS
TEMPERATURE: 97.2 F | RESPIRATION RATE: 16 BRPM | OXYGEN SATURATION: 97 % | SYSTOLIC BLOOD PRESSURE: 138 MMHG | DIASTOLIC BLOOD PRESSURE: 76 MMHG | WEIGHT: 291.6 LBS | HEART RATE: 63 BPM | BODY MASS INDEX: 41.84 KG/M2

## 2023-11-02 DIAGNOSIS — E11.9 TYPE 2 DIABETES MELLITUS WITHOUT COMPLICATION, WITHOUT LONG-TERM CURRENT USE OF INSULIN (H): ICD-10-CM

## 2023-11-02 DIAGNOSIS — G60.9 CHRONIC IDIOPATHIC AXONAL POLYNEUROPATHY: Primary | ICD-10-CM

## 2023-11-02 LAB
ANION GAP SERPL CALCULATED.3IONS-SCNC: 13 MMOL/L (ref 7–15)
BUN SERPL-MCNC: 14 MG/DL (ref 8–23)
CALCIUM SERPL-MCNC: 9.7 MG/DL (ref 8.8–10.2)
CHLORIDE SERPL-SCNC: 103 MMOL/L (ref 98–107)
CHOLEST SERPL-MCNC: 178 MG/DL
CREAT SERPL-MCNC: 0.89 MG/DL (ref 0.67–1.17)
CREAT UR-MCNC: 224.1 MG/DL
DEPRECATED HCO3 PLAS-SCNC: 24 MMOL/L (ref 22–29)
EGFRCR SERPLBLD CKD-EPI 2021: >90 ML/MIN/1.73M2
GLUCOSE SERPL-MCNC: 158 MG/DL (ref 70–99)
HBA1C MFR BLD: 7.4 % (ref 4–6.2)
HDLC SERPL-MCNC: 43 MG/DL
LDLC SERPL CALC-MCNC: 98 MG/DL
MICROALBUMIN UR-MCNC: 14.7 MG/L
MICROALBUMIN/CREAT UR: 6.56 MG/G CR (ref 0–17)
NONHDLC SERPL-MCNC: 135 MG/DL
POTASSIUM SERPL-SCNC: 4.7 MMOL/L (ref 3.4–5.3)
SODIUM SERPL-SCNC: 140 MMOL/L (ref 135–145)
TRIGL SERPL-MCNC: 186 MG/DL

## 2023-11-02 PROCEDURE — 99214 OFFICE O/P EST MOD 30 MIN: CPT | Performed by: FAMILY MEDICINE

## 2023-11-02 PROCEDURE — 82570 ASSAY OF URINE CREATININE: CPT | Mod: ZL | Performed by: FAMILY MEDICINE

## 2023-11-02 PROCEDURE — 36415 COLL VENOUS BLD VENIPUNCTURE: CPT | Mod: ZL | Performed by: FAMILY MEDICINE

## 2023-11-02 PROCEDURE — 80048 BASIC METABOLIC PNL TOTAL CA: CPT | Mod: ZL | Performed by: FAMILY MEDICINE

## 2023-11-02 PROCEDURE — 80061 LIPID PANEL: CPT | Mod: ZL | Performed by: FAMILY MEDICINE

## 2023-11-02 PROCEDURE — 83036 HEMOGLOBIN GLYCOSYLATED A1C: CPT | Mod: ZL | Performed by: FAMILY MEDICINE

## 2023-11-02 RX ORDER — OXYCODONE HYDROCHLORIDE 10 MG/1
10 TABLET ORAL EVERY 4 HOURS PRN
Qty: 180 TABLET | Refills: 0 | Status: SHIPPED | OUTPATIENT
Start: 2023-12-28 | End: 2024-02-02

## 2023-11-02 RX ORDER — OXYCODONE HYDROCHLORIDE 10 MG/1
10 TABLET ORAL EVERY 4 HOURS PRN
Qty: 180 TABLET | Refills: 0 | Status: SHIPPED | OUTPATIENT
Start: 2023-11-02 | End: 2024-02-02

## 2023-11-02 RX ORDER — OXYCODONE HYDROCHLORIDE 10 MG/1
10 TABLET ORAL EVERY 4 HOURS PRN
Qty: 180 TABLET | Refills: 0 | Status: SHIPPED | OUTPATIENT
Start: 2023-11-30 | End: 2024-02-02

## 2023-11-02 ASSESSMENT — PAIN SCALES - GENERAL: PAINLEVEL: SEVERE PAIN (7)

## 2023-11-02 NOTE — PROGRESS NOTES
"  Assessment & Plan     (G60.9) Chronic idiopathic axonal polyneuropathy  (primary encounter diagnosis)  Comment: hand symptoms are most consistent with this and perhaps some DJD. Has tried an exhaustive number of interventions and symptoms are continuing to progress. We did talk about diet changes to lower the trigs, perhaps this would help.  stable. Refilled meds without changes    Plan: oxyCODONE IR (ROXICODONE) 10 MG tablet,         oxyCODONE IR (ROXICODONE) 10 MG tablet,         oxyCODONE IR (ROXICODONE) 10 MG tablet             (E11.9) Type 2 diabetes mellitus without complication, without long-term current use of insulin (H)  Comment: is reasonably well controled without meds. Encouraged diet changes again.   Plan: Hemoglobin A1c, Lipid Panel, Albumin Random         Urine Quantitative with Creat Ratio, Basic         Metabolic Panel                      BMI:   Estimated body mass index is 41.84 kg/m  as calculated from the following:    Height as of 4/14/23: 1.778 m (5' 10\").    Weight as of this encounter: 132.3 kg (291 lb 9.6 oz).           Return in about 3 months (around 2/2/2024).    Kaiden Chavez MD  [facility] AND [facility]    Subjective   Regulo is a 63 year old, presenting for the following health issues:  Pain Management and Diabetes        11/2/2023    10:17 AM   Additional Questions   Roomed by GLENN Rocha   Accompanied by Self         11/2/2023    10:17 AM   Patient Reported Additional Medications   Patient reports taking the following new medications N/A       History of Present Illness       Reason for visit:  Chronic pain    He eats 2-3 servings of fruits and vegetables daily.He consumes 0 sweetened beverage(s) daily.He exercises with enough effort to increase his heart rate 9 or less minutes per day.  He exercises with enough effort to increase his heart rate 3 or less days per week.   He is taking medications regularly.       Having more pain in his hands now too. Aching mostly at " night with making a fist. Bilateral. Using pillows to prop them. No specific fingers.    Has tried several diabetes meds, including metformin, which have flared his neuropathy. Not really following a diabetes diet.     Diabetes Follow-up    How often are you checking your blood sugar? Not at all  What concerns do you have today about your diabetes? None   Do you have any of these symptoms? (Select all that apply)  Burning in feet      BP Readings from Last 2 Encounters:   08/02/23 132/82   04/14/23 138/84     Hemoglobin A1C (%)   Date Value   08/02/2023 6.9 (H)   04/14/2023 6.6 (H)   11/12/2019 6.1 (H)     LDL Cholesterol Calculated   Date Value   11/10/2022      Comment:     Cannot estimate LDL when triglyceride exceeds 400 mg/dL   11/02/2021 155 mg/dL (H)   01/07/2020 146 mg/dL (H)   12/04/2019     Cannot estimate LDL when triglyceride exceeds 400 mg/dL mg/dL         Pain History:  When did you first notice your pain? Many years ago   Have you seen this provider for your pain in the past? Yes   Where in your body do you have pain? feet  Are you seeing anyone else for your pain? No        4/14/2023    12:57 PM 8/2/2023     9:13 AM 11/1/2023     1:04 PM   PHQ-9 SCORE   PHQ-9 Total Score MyChart 4 (Minimal depression) 3 (Minimal depression) 2 (Minimal depression)   PHQ-9 Total Score 4 3 2           2/6/2023    11:13 AM 8/2/2023     9:15 AM 10/29/2023     7:10 AM   NEFTALY-7 SCORE   Total Score 0 (minimal anxiety) 0 (minimal anxiety) 0 (minimal anxiety)   Total Score 0 0 0           4/14/2023     1:12 PM 8/2/2023     9:18 AM 11/2/2023    10:20 AM   PEG Score   PEG Total Score 4 5.33 7.33           Chronic Pain Follow Up:    Location of pain: feet, and now hands  Analgesia/pain control:    - Recent changes:  no    - Overall control: Inadequate pain control    - Current treatments: no alcohol, THC, topicals, spinal stimulator   Adherence:     - Do you ever take more pain medicine than prescribed? No    - When did you take  your last dose of pain medicine?  Today    Adverse effects: No   PDMP Review         Value Time User    State PDMP site checked  Yes 11/2/2023 10:35 AM Kaiden Chavez MD          Last CSA Agreement:   CSA -- Patient Level:     [Media Unavailable] Controlled Substance Agreement - Opioid - Scan on 4/14/2023  2:07 PM   [Media Unavailable] Controlled Substance Agreement - Opioid - Scan on 1/26/2022 12:05 PM: opiod       Last UDS: 8/4/2023              Review of Systems         Objective    Temp 97.2  F (36.2  C) (Tympanic)   Wt 132.3 kg (291 lb 9.6 oz)   BMI 41.84 kg/m    Body mass index is 41.84 kg/m .  Physical Exam   GENERAL: healthy, alert and no distress  EYES: Eyes grossly normal to inspection, PERRL and conjunctivae and sclerae normal  RESP: lungs clear to auscultation - no rales, rhonchi or wheezes  CV: regular rate and rhythm, normal S1 S2, no S3 or S4, no murmur, click or rub, no peripheral edema and peripheral pulses strong  SKIN: no suspicious lesions or rashes  PSYCH: mentation appears normal, affect normal/bright    Results for orders placed or performed in visit on 11/02/23   Hemoglobin A1c     Status: Abnormal   Result Value Ref Range    Hemoglobin A1C 7.4 (H) 4.0 - 6.2 %   Lipid Panel     Status: Abnormal   Result Value Ref Range    Cholesterol 178 <200 mg/dL    Triglycerides 186 (H) <150 mg/dL    Direct Measure HDL 43 >=40 mg/dL    LDL Cholesterol Calculated 98 <=100 mg/dL    Non HDL Cholesterol 135 (H) <130 mg/dL    Narrative    Cholesterol  Desirable:  <200 mg/dL    Triglycerides  Normal:  Less than 150 mg/dL  Borderline High:  150-199 mg/dL  High:  200-499 mg/dL  Very High:  Greater than or equal to 500 mg/dL    Direct Measure HDL  Female:  Greater than or equal to 50 mg/dL   Male:  Greater than or equal to 40 mg/dL    LDL Cholesterol  Desirable:  <100mg/dL  Above Desirable:  100-129 mg/dL   Borderline High:  130-159 mg/dL   High:  160-189 mg/dL   Very High:  >= 190 mg/dL    Non HDL  Cholesterol  Desirable:  130 mg/dL  Above Desirable:  130-159 mg/dL  Borderline High:  160-189 mg/dL  High:  190-219 mg/dL  Very High:  Greater than or equal to 220 mg/dL   Albumin Random Urine Quantitative with Creat Ratio     Status: None   Result Value Ref Range    Creatinine Urine mg/dL 224.1 mg/dL    Albumin Urine mg/L 14.7 mg/L    Albumin Urine mg/g Cr 6.56 0.00 - 17.00 mg/g Cr   Basic Metabolic Panel     Status: Abnormal   Result Value Ref Range    Sodium 140 135 - 145 mmol/L    Potassium 4.7 3.4 - 5.3 mmol/L    Chloride 103 98 - 107 mmol/L    Carbon Dioxide (CO2) 24 22 - 29 mmol/L    Anion Gap 13 7 - 15 mmol/L    Urea Nitrogen 14.0 8.0 - 23.0 mg/dL    Creatinine 0.89 0.67 - 1.17 mg/dL    GFR Estimate >90 >60 mL/min/1.73m2    Calcium 9.7 8.8 - 10.2 mg/dL    Glucose 158 (H) 70 - 99 mg/dL

## 2023-11-02 NOTE — PROGRESS NOTES
{PROVIDER CHARTING PREFERENCE:379854}    Ivett Rajput is a 63 year old, presenting for the following health issues:  Pain Management and Diabetes      11/2/2023    10:17 AM   Additional Questions   Roomed by GLENN Rocha   Accompanied by Self         11/2/2023    10:17 AM   Patient Reported Additional Medications   Patient reports taking the following new medications N/A       History of Present Illness       Reason for visit:  Chronic pain    He eats 2-3 servings of fruits and vegetables daily.He consumes 0 sweetened beverage(s) daily.He exercises with enough effort to increase his heart rate 9 or less minutes per day.  He exercises with enough effort to increase his heart rate 3 or less days per week.   He is taking medications regularly.         Pain History:  When did you first notice your pain? Chronic   Have you seen this provider for your pain in the past? Yes   Where in your body do you have pain? Bilateral feet  Are you seeing anyone else for your pain? No        4/14/2023    12:57 PM 8/2/2023     9:13 AM 11/1/2023     1:04 PM   PHQ-9 SCORE   PHQ-9 Total Score MyChart 4 (Minimal depression) 3 (Minimal depression) 2 (Minimal depression)   PHQ-9 Total Score 4 3 2           2/6/2023    11:13 AM 8/2/2023     9:15 AM 10/29/2023     7:10 AM   NEFTALY-7 SCORE   Total Score 0 (minimal anxiety) 0 (minimal anxiety) 0 (minimal anxiety)   Total Score 0 0 0           4/14/2023     1:12 PM 8/2/2023     9:18 AM 11/2/2023    10:20 AM   PEG Score   PEG Total Score 4 5.33 7.33           4/14/2023     1:12 PM 8/2/2023     9:18 AM 11/2/2023    10:20 AM   PEG Score   PEG Total Score 4 5.33 7.33       Chronic Pain Follow Up:    Location of pain: ***  Analgesia/pain control:    - Recent changes:  ***    - Overall control: {Pain control level :832720}    - Current treatments: ***   Adherence:     - Do you ever take more pain medicine than prescribed? {Yes ***/No :840147}    - When did you take your last dose of pain medicine?   ***   Adverse effects: {Yes ***/No :316271}   PDMP Review         Value Time User    State PDMP site checked  Yes 4/14/2023  1:30 PM Kaiden Chavez MD          Last CSA Agreement:   CSA -- Patient Level:     [Media Unavailable] Controlled Substance Agreement - Opioid - Scan on 4/14/2023  2:07 PM   [Media Unavailable] Controlled Substance Agreement - Opioid - Scan on 1/26/2022 12:05 PM: opiod       Last UDS: 8/4/2023  {If newly prescribing or considering opioid therapy, the following assessments must be completed :555047}  {Pull in ORT  RIOSORD results (Optional) :027832}    {Provider  Link to Pain Management SmartSet  Includes non-opioid pharmacological medications and referrals  :904604}  {additonal problems for provider to add (Optional):786207}      Review of Systems   {ROS COMP (Optional):334452}      Objective    There were no vitals taken for this visit.  There is no height or weight on file to calculate BMI.  Physical Exam   {Exam List (Optional):752391}    {Diagnostic Test Results (Optional):475147}    {AMBULATORY ATTESTATION (Optional):938749}

## 2023-11-10 ENCOUNTER — TRANSFERRED RECORDS (OUTPATIENT)
Dept: HEALTH INFORMATION MANAGEMENT | Facility: OTHER | Age: 63
End: 2023-11-10
Payer: COMMERCIAL

## 2024-01-03 ENCOUNTER — TELEPHONE (OUTPATIENT)
Dept: FAMILY MEDICINE | Facility: OTHER | Age: 64
End: 2024-01-03
Payer: COMMERCIAL

## 2024-01-03 NOTE — TELEPHONE ENCOUNTER
Patient needs to get in early Feb. In order to get med refilled.      Mino Eisenberg on 1/3/2024 at 2:11 PM

## 2024-01-03 NOTE — TELEPHONE ENCOUNTER
Patient picked up his prescription of Oxycodone today.  Patient thought that he made his follow up visits the last time he was here but forgot.    Patient is looking for an appointment for the end of January or very beginning of Feb.       Patient is away the Kaiden Chavez MD is out this week.      Tosin Saini LPN 1/3/2024 4:39 PM

## 2024-01-08 NOTE — TELEPHONE ENCOUNTER
Spoke with patient and scheduled him with [facility]JP 02/02/2024 for a pain medication follow up.  Josefina Prater LPN

## 2024-02-01 ASSESSMENT — PATIENT HEALTH QUESTIONNAIRE - PHQ9
SUM OF ALL RESPONSES TO PHQ QUESTIONS 1-9: 7
10. IF YOU CHECKED OFF ANY PROBLEMS, HOW DIFFICULT HAVE THESE PROBLEMS MADE IT FOR YOU TO DO YOUR WORK, TAKE CARE OF THINGS AT HOME, OR GET ALONG WITH OTHER PEOPLE: SOMEWHAT DIFFICULT
SUM OF ALL RESPONSES TO PHQ QUESTIONS 1-9: 7

## 2024-02-02 ENCOUNTER — OFFICE VISIT (OUTPATIENT)
Dept: FAMILY MEDICINE | Facility: OTHER | Age: 64
End: 2024-02-02
Attending: FAMILY MEDICINE
Payer: COMMERCIAL

## 2024-02-02 VITALS
WEIGHT: 304.25 LBS | BODY MASS INDEX: 43.66 KG/M2 | DIASTOLIC BLOOD PRESSURE: 80 MMHG | SYSTOLIC BLOOD PRESSURE: 134 MMHG | OXYGEN SATURATION: 95 % | RESPIRATION RATE: 16 BRPM | TEMPERATURE: 97.3 F | HEART RATE: 65 BPM

## 2024-02-02 DIAGNOSIS — E11.9 TYPE 2 DIABETES MELLITUS WITHOUT COMPLICATION, WITHOUT LONG-TERM CURRENT USE OF INSULIN (H): Primary | ICD-10-CM

## 2024-02-02 DIAGNOSIS — F33.0 MAJOR DEPRESSIVE DISORDER, RECURRENT EPISODE, MILD (H): ICD-10-CM

## 2024-02-02 DIAGNOSIS — K21.9 GASTROESOPHAGEAL REFLUX DISEASE, UNSPECIFIED WHETHER ESOPHAGITIS PRESENT: ICD-10-CM

## 2024-02-02 DIAGNOSIS — G60.9 CHRONIC IDIOPATHIC AXONAL POLYNEUROPATHY: ICD-10-CM

## 2024-02-02 LAB — HBA1C MFR BLD: 7.5 % (ref 4–6.2)

## 2024-02-02 PROCEDURE — 36415 COLL VENOUS BLD VENIPUNCTURE: CPT | Mod: ZL | Performed by: FAMILY MEDICINE

## 2024-02-02 PROCEDURE — 83036 HEMOGLOBIN GLYCOSYLATED A1C: CPT | Mod: ZL | Performed by: FAMILY MEDICINE

## 2024-02-02 PROCEDURE — 99214 OFFICE O/P EST MOD 30 MIN: CPT | Performed by: FAMILY MEDICINE

## 2024-02-02 RX ORDER — OMEPRAZOLE 40 MG/1
40 CAPSULE, DELAYED RELEASE ORAL DAILY
Qty: 90 CAPSULE | Refills: 4 | Status: SHIPPED | OUTPATIENT
Start: 2024-02-02

## 2024-02-02 RX ORDER — OXYCODONE HYDROCHLORIDE 10 MG/1
10 TABLET ORAL EVERY 4 HOURS PRN
Qty: 180 TABLET | Refills: 0 | Status: SHIPPED | OUTPATIENT
Start: 2024-03-01 | End: 2024-05-03

## 2024-02-02 RX ORDER — OXYCODONE HYDROCHLORIDE 10 MG/1
10 TABLET ORAL EVERY 4 HOURS PRN
Qty: 180 TABLET | Refills: 0 | Status: SHIPPED | OUTPATIENT
Start: 2024-02-02 | End: 2024-05-03

## 2024-02-02 RX ORDER — OXYCODONE HYDROCHLORIDE 10 MG/1
10 TABLET ORAL EVERY 4 HOURS PRN
Qty: 180 TABLET | Refills: 0 | Status: SHIPPED | OUTPATIENT
Start: 2024-03-29 | End: 2024-05-03

## 2024-02-02 ASSESSMENT — PAIN SCALES - GENERAL: PAINLEVEL: MODERATE PAIN (5)

## 2024-02-02 NOTE — NURSING NOTE
"Chief Complaint   Patient presents with    Pain Management     Oxycodone       Initial /80   Pulse 65   Temp 97.3  F (36.3  C) (Tympanic)   Resp 16   Wt 138 kg (304 lb 4 oz)   SpO2 95%   BMI 43.66 kg/m   Estimated body mass index is 43.66 kg/m  as calculated from the following:    Height as of 4/14/23: 1.778 m (5' 10\").    Weight as of this encounter: 138 kg (304 lb 4 oz).  Medication Reconciliation: complete          "

## 2024-02-02 NOTE — PROGRESS NOTES
"  Assessment & Plan     (E11.9) Type 2 diabetes mellitus without complication, without long-term current use of insulin (H)  (primary encounter diagnosis)  Comment: Last A1c 7.4. Unable to take metformin due to worsening neuropathy. Was prescribed Jardiance, but did not start due to side effect to perineum. Will check A1c today. Recommended continued diet and exercise.   Plan: Hemoglobin A1c          (G60.9) Chronic idiopathic axonal polyneuropathy  Comment: Feet continue to burn at night which prevents him from sleeping. He feels that his mirtazapine is not worth the side effects of dry mouth and weight gain so would like to stop. Has tried many other anti-depressants in the past without success. Does have DRG implant that can be auto-programmed via an nahid. Also mentioned new ache in hands. He says that this is new within the last 5-6 months, it is worst at night, and gets better with rest. Differential includes CIAP, osteoarthritis, other autoimmune process. Possibly osteoarthritis, but with his history of CIAP, will continue to monitor for signs of worsening symptoms.   Plan: oxyCODONE IR (ROXICODONE) 10 MG tablet,         oxyCODONE IR (ROXICODONE) 10 MG tablet,         oxyCODONE IR (ROXICODONE) 10 MG tablet          (K21.9) Gastroesophageal reflux disease, unspecified whether esophagitis present  Comment: Would like refill on omeprazole.   Plan: omeprazole (PRILOSEC) 40 MG DR capsule          (F33.0) Major depressive disorder, recurrent episode, mild (H24)  Comment: PHQ-9 7 today. Denies new stressors and attributes his feelings to his chronic disease.   Plan: Follow up in 3 months             BMI  Estimated body mass index is 43.66 kg/m  as calculated from the following:    Height as of 4/14/23: 1.778 m (5' 10\").    Weight as of this encounter: 138 kg (304 lb 4 oz).   Weight management plan: Discussed healthy diet and exercise guidelines    Follow up in 3 months.     Subjective   Regulo is a 63 year old, " presenting for the following health issues:  Pain Management (Oxycodone)      2/2/2024     9:41 AM   Additional Questions   Roomed by GLENN Rocha   Accompanied by Self         2/2/2024     9:41 AM   Patient Reported Additional Medications   Patient reports taking the following new medications N/A     Via the Health Maintenance questionnaire, the patient has reported the following services have been completed -Eye Exam, this information has been sent to the abstraction team.  History of Present Illness       Reason for visit:  Pain med for neuropathy    He eats 4 or more servings of fruits and vegetables daily.He consumes 0 sweetened beverage(s) daily.   He is taking medications regularly.       Chronic Pain Follow Up:    Location of pain: feet  Analgesia/pain control:    - Recent changes:  now feels aching pain in hands     - Overall control: Oxycodone controls for a few hours, but worsening pain as nears 4 hours.     - Current treatments: Oxycodone, DRG in back, last saw neurosurgery one month ago. DRG can be re-programmed via nahid. [employer] marijuana SL to help with sleep.   Adherence:     - Do you ever take more pain medicine than prescribed? Does not, but is in pain and thinks about taking more.     - When did you take your last dose of pain medicine?  0600 this AM    Adverse effects: No side effects. No constipation.      T2DM: A1c 7.4 11/2/23. Tried metformin but worsened neuropathy. Balanced diet.     Hand pain: aches at night, gets better with rest. Started in the last 5-6 months. Used to have a strong handshake, but now feels that he can barely grasp items.     Depression: Swims regularly. Denies new financial stressors.     Sleep: taking mirtazapine, but does not like dry mouth, weight gain, and depression. Neuropathy prevents him from falling asleep, pain is worse at night. Takes melatonin 10 mg at night. [place] medical marijuana SL to help with sleep, feels that it makes the pain bearable.  "    Pain History:  When did you first notice your pain? [place]   Have you seen this provider for your pain in the past? Yes   Where in your body do you have pain? Feet  Are you seeing anyone else for your pain? No        8/2/2023     9:13 AM 11/1/2023     1:04 PM 2/1/2024     8:48 AM   PHQ-9 SCORE   PHQ-9 Total Score MyChart 3 (Minimal depression) 2 (Minimal depression) 7 (Mild depression)   PHQ-9 Total Score 3 2 7           2/6/2023    11:13 AM 8/2/2023     9:15 AM 10/29/2023     7:10 AM   NEFTALY-7 SCORE   Total Score 0 (minimal anxiety) 0 (minimal anxiety) 0 (minimal anxiety)   Total Score 0 0 0           8/2/2023     9:18 AM 11/2/2023    10:20 AM 2/2/2024     9:44 AM   PEG Score   PEG Total Score 5.33 7.33 7.67           8/2/2023     9:18 AM 11/2/2023    10:20 AM 2/2/2024     9:44 AM   PEG Score   PEG Total Score 5.33 7.33 7.67     PDMP Review         Value Time User    State PDMP site checked  Yes 11/2/2023 10:35 AM Kaiden Chavez MD          Last CSA Agreement:   CSA -- Patient Level:     [Media Unavailable] Controlled Substance Agreement - Opioid - Scan on 4/14/2023  2:07 PM   [Media Unavailable] Controlled Substance Agreement - Opioid - Scan on 1/26/2022 12:05 PM: opiod       Last UDS: 8/4/2023 2/2/2024    11:36 AM   RIOSORD Total Score   RIOSORD Total Score 10     Average probability of overdose or serious opioid-induced respiratory depression in the next six months:   Points    Risk   0-4    2%   5-7    5%   8-9    7%   10-17    15%   18-25    30%   26-41    55%   42    83%    OBJECTIVE:   /80   Pulse 65   Temp 97.3  F (36.3  C) (Tympanic)   Resp 16   Wt 138 kg (304 lb 4 oz)   SpO2 95%   BMI 43.66 kg/m     Estimated body mass index is 43.66 kg/m  as calculated from the following:    Height as of 4/14/23: 1.778 m (5' 10\").    Weight as of this encounter: 138 kg (304 lb 4 oz).    ROS  ENDOCRINE: POSITIVE  for weight gain and NEGATIVE for constipation, diarrhea, and polyuria      Objective  "   /80   Pulse 65   Temp 97.3  F (36.3  C) (Tympanic)   Resp 16   Wt 138 kg (304 lb 4 oz)   SpO2 95%   BMI 43.66 kg/m    Body mass index is 43.66 kg/m .   Physical Exam   GENERAL: alert and no distress  RESP: lungs clear to auscultation - no rales, rhonchi or wheezes  CV: regular rate and rhythm, normal S1 S2, no S3 or S4, no murmur, click or rub, no peripheral edema   MS: Bilateral hands show no deformities, no erythema, induration, or nodules, and no evidence of joint effusion. Bilateral feet show normal DP and PT pulses, no trophic changes or ulcerative lesions, and hyperesthesia sensory exam  SKIN: no suspicious lesions or rashes  PSYCH: mentation appears normal, affect normal/bright    Results for orders placed or performed in visit on 02/02/24 (from the past 24 hour(s))   Hemoglobin A1c   Result Value Ref Range    Hemoglobin A1C 7.5 (H) 4.0 - 6.2 %         Tila Minor, MS3    Pt was seen and examined by me as well as Tasia Minor, MS3    [facility]  Kaiden Chavez MD, Family Medicine

## 2024-02-06 ENCOUNTER — MYC MEDICAL ADVICE (OUTPATIENT)
Dept: FAMILY MEDICINE | Facility: OTHER | Age: 64
End: 2024-02-06
Payer: COMMERCIAL

## 2024-02-06 DIAGNOSIS — F33.0 MAJOR DEPRESSIVE DISORDER, RECURRENT EPISODE, MILD (H): ICD-10-CM

## 2024-02-08 RX ORDER — MIRTAZAPINE 30 MG/1
30 TABLET, FILM COATED ORAL AT BEDTIME
Qty: 90 TABLET | Refills: 4 | Status: SHIPPED | OUTPATIENT
Start: 2024-02-08

## 2024-02-08 NOTE — TELEPHONE ENCOUNTER
Med discontinued in OV on 2/2/2024.  Patient asking to re-start     Requested Prescriptions   Pending Prescriptions Disp Refills    mirtazapine (REMERON) 30 MG tablet 90 tablet 4     Sig: Take 1 tablet (30 mg) by mouth at bedtime       There is no refill protocol information for this order          Last Prescription Date:   2/6/223  Last Fill Qty/Refills:         90, R-4    Last Office Visit:              2/2/2024   Future Office visit:           5/3/2024    Ashley Grullon RN on 2/8/2024 at 10:09 AM

## 2024-03-15 ENCOUNTER — TELEPHONE (OUTPATIENT)
Dept: FAMILY MEDICINE | Facility: OTHER | Age: 64
End: 2024-03-15
Payer: COMMERCIAL

## 2024-03-15 ENCOUNTER — ORDERS ONLY (AUTO-RELEASED) (OUTPATIENT)
Dept: FAMILY MEDICINE | Facility: OTHER | Age: 64
End: 2024-03-15
Payer: COMMERCIAL

## 2024-03-15 DIAGNOSIS — Z12.11 COLON CANCER SCREENING: ICD-10-CM

## 2024-03-15 DIAGNOSIS — Z12.11 COLON CANCER SCREENING: Primary | ICD-10-CM

## 2024-03-15 NOTE — TELEPHONE ENCOUNTER
Please call the patients spouse.  The patient would like to get a Cologuard kit ordered.           Josefina Iniguez on 3/15/2024 at 3:46 PM

## 2024-04-05 LAB — NONINV COLON CA DNA+OCC BLD SCRN STL QL: NEGATIVE

## 2024-04-29 ASSESSMENT — ASTHMA QUESTIONNAIRES
QUESTION_2 LAST FOUR WEEKS HOW OFTEN HAVE YOU HAD SHORTNESS OF BREATH: NOT AT ALL
QUESTION_1 LAST FOUR WEEKS HOW MUCH OF THE TIME DID YOUR ASTHMA KEEP YOU FROM GETTING AS MUCH DONE AT WORK, SCHOOL OR AT HOME: NONE OF THE TIME
QUESTION_4 LAST FOUR WEEKS HOW OFTEN HAVE YOU USED YOUR RESCUE INHALER OR NEBULIZER MEDICATION (SUCH AS ALBUTEROL): NOT AT ALL
QUESTION_5 LAST FOUR WEEKS HOW WOULD YOU RATE YOUR ASTHMA CONTROL: WELL CONTROLLED
ACT_TOTALSCORE: 24
QUESTION_3 LAST FOUR WEEKS HOW OFTEN DID YOUR ASTHMA SYMPTOMS (WHEEZING, COUGHING, SHORTNESS OF BREATH, CHEST TIGHTNESS OR PAIN) WAKE YOU UP AT NIGHT OR EARLIER THAN USUAL IN THE MORNING: NOT AT ALL
ACT_TOTALSCORE: 24

## 2024-05-03 ENCOUNTER — OFFICE VISIT (OUTPATIENT)
Dept: FAMILY MEDICINE | Facility: OTHER | Age: 64
End: 2024-05-03
Attending: FAMILY MEDICINE
Payer: COMMERCIAL

## 2024-05-03 VITALS
SYSTOLIC BLOOD PRESSURE: 142 MMHG | BODY MASS INDEX: 44.28 KG/M2 | WEIGHT: 308.6 LBS | OXYGEN SATURATION: 95 % | TEMPERATURE: 97.1 F | DIASTOLIC BLOOD PRESSURE: 84 MMHG | RESPIRATION RATE: 16 BRPM | HEART RATE: 65 BPM

## 2024-05-03 DIAGNOSIS — E11.9 TYPE 2 DIABETES MELLITUS WITHOUT COMPLICATION, WITHOUT LONG-TERM CURRENT USE OF INSULIN (H): ICD-10-CM

## 2024-05-03 DIAGNOSIS — G60.9 CHRONIC IDIOPATHIC AXONAL POLYNEUROPATHY: Primary | ICD-10-CM

## 2024-05-03 LAB — HBA1C MFR BLD: 8.9 % (ref 4–6.2)

## 2024-05-03 PROCEDURE — 99214 OFFICE O/P EST MOD 30 MIN: CPT | Performed by: FAMILY MEDICINE

## 2024-05-03 PROCEDURE — 83036 HEMOGLOBIN GLYCOSYLATED A1C: CPT | Mod: ZL | Performed by: FAMILY MEDICINE

## 2024-05-03 PROCEDURE — 36415 COLL VENOUS BLD VENIPUNCTURE: CPT | Mod: ZL | Performed by: FAMILY MEDICINE

## 2024-05-03 RX ORDER — OXYCODONE HYDROCHLORIDE 10 MG/1
10 TABLET ORAL EVERY 4 HOURS PRN
Qty: 180 TABLET | Refills: 0 | Status: SHIPPED | OUTPATIENT
Start: 2024-06-28 | End: 2024-07-26

## 2024-05-03 RX ORDER — OXYCODONE HYDROCHLORIDE 10 MG/1
10 TABLET ORAL EVERY 4 HOURS PRN
Qty: 180 TABLET | Refills: 0 | Status: SHIPPED | OUTPATIENT
Start: 2024-05-03 | End: 2024-07-26

## 2024-05-03 RX ORDER — OXYCODONE HYDROCHLORIDE 10 MG/1
10 TABLET ORAL EVERY 4 HOURS PRN
Qty: 180 TABLET | Refills: 0 | Status: SHIPPED | OUTPATIENT
Start: 2024-05-31 | End: 2024-07-26

## 2024-05-03 ASSESSMENT — PATIENT HEALTH QUESTIONNAIRE - PHQ9
SUM OF ALL RESPONSES TO PHQ QUESTIONS 1-9: 4
SUM OF ALL RESPONSES TO PHQ QUESTIONS 1-9: 4
10. IF YOU CHECKED OFF ANY PROBLEMS, HOW DIFFICULT HAVE THESE PROBLEMS MADE IT FOR YOU TO DO YOUR WORK, TAKE CARE OF THINGS AT HOME, OR GET ALONG WITH OTHER PEOPLE: NOT DIFFICULT AT ALL

## 2024-05-03 ASSESSMENT — PAIN SCALES - GENERAL: PAINLEVEL: EXTREME PAIN (8)

## 2024-05-03 NOTE — NURSING NOTE
"Chief Complaint   Patient presents with    Recheck Medication     Oxycodone       Initial BP (!) 142/84   Pulse 65   Temp 97.1  F (36.2  C) (Tympanic)   Resp 16   Wt 140 kg (308 lb 9.6 oz)   SpO2 95%   BMI 44.28 kg/m   Estimated body mass index is 44.28 kg/m  as calculated from the following:    Height as of 4/14/23: 1.778 m (5' 10\").    Weight as of this encounter: 140 kg (308 lb 9.6 oz).  Medication Reconciliation: complete          "

## 2024-05-03 NOTE — LETTER

## 2024-05-03 NOTE — PROGRESS NOTES
Assessment & Plan     (G60.9) Chronic idiopathic axonal polyneuropathy  (primary encounter diagnosis)  Comment: slowly progressing with a significant work up and unfortunately minimal improvement with nearly all of the treatment modalities he has tried.  reviewed., stable with low risk for misuse or diversion. Refilled without changes.   Plan: oxyCODONE IR (ROXICODONE) 10 MG tablet,         oxyCODONE IR (ROXICODONE) 10 MG tablet,         oxyCODONE IR (ROXICODONE) 10 MG tablet             (E11.9) Type 2 diabetes mellitus without complication, without long-term current use of insulin (H)  Comment: significant increase in his A1c. Will add on metformin now, encourage diet, exercise. Repeat A1c in 3 months.   Plan: Hemoglobin A1c, metFORMIN (GLUCOPHAGE) 1000 MG         tablet                           Return in about 3 months (around 8/3/2024).    Ivett Rajput is a 63 year old, presenting for the following health issues:  Recheck Medication (Oxycodone)      5/3/2024    10:30 AM   Additional Questions   Roomed by GLENN Rocha   Accompanied by Self         5/3/2024    10:30 AM   Patient Reported Additional Medications   Patient reports taking the following new medications N/A     History of Present Illness       Reason for visit:  Neuropathy in feet    He eats 2-3 servings of fruits and vegetables daily.He consumes 0 sweetened beverage(s) daily.He exercises with enough effort to increase his heart rate 10 to 19 minutes per day.  He exercises with enough effort to increase his heart rate 3 or less days per week.   He is taking medications regularly.         Pain History:    Getting worse, slowly/ hands now aching too, worse at night.     When did you first notice your pain? Chronic   Have you seen this provider for your pain in the past? Yes   Where in your body do you have pain? Bilateral feet  Are you seeing anyone else for your pain? No        11/1/2023     1:04 PM 2/1/2024     8:48 AM 5/3/2024    10:23 AM    PHQ-9 SCORE   PHQ-9 Total Score MyChart 2 (Minimal depression) 7 (Mild depression) 4 (Minimal depression)   PHQ-9 Total Score 2 7 4           2/6/2023    11:13 AM 8/2/2023     9:15 AM 10/29/2023     7:10 AM   NEFTALY-7 SCORE   Total Score 0 (minimal anxiety) 0 (minimal anxiety) 0 (minimal anxiety)   Total Score 0 0 0           11/2/2023    10:20 AM 2/2/2024     9:44 AM 5/3/2024    10:34 AM   PEG Score   PEG Total Score 7.33 7.67 8           11/2/2023    10:20 AM 2/2/2024     9:44 AM 5/3/2024    10:34 AM   PEG Score   PEG Total Score 7.33 7.67 8       Chronic Pain Follow Up:    Location of pain: feet mostly, hands now  Analgesia/pain control:    - Recent changes:  no    - Overall control: Tolerable with discomfort    - Current treatments: THC, alphalipoic acid, opiates   Adherence:     - Do you ever take more pain medicine than prescribed? No    - When did you take your last dose of pain medicine?  today   Adverse effects: No   PDMP Review         Value Time User    State PDMP site checked  Yes 2/2/2024 10:41 AM Kaiden Chavez MD          Last CSA Agreement:   CSA -- Patient Level:     [Media Unavailable] Controlled Substance Agreement - Opioid - Scan on 4/14/2023  2:07 PM   [Media Unavailable] Controlled Substance Agreement - Opioid - Scan on 1/26/2022 12:05 PM: opiod       Last UDS: 8/4/2023                        Recent Labs   Lab Test 02/02/24  1058 11/02/23  1109 08/02/23  0954 04/14/23  1355 11/10/22  1632 07/22/22  1405 11/02/21  1059 01/07/20  0839 11/12/19  1331 04/17/18  1104   0000   A1C 7.5* 7.4* 6.9*   < > 6.5*   < >  --   --    < >  --   --    LDL  --  98  --   --   --   --  155* 146*   < >  --   --    HDL  --  43  --   --  37*  --  42 40   < >  --   --    TRIG  --  186*  --   --  567*  --  260* 326*   < >  --   --    ALT  --   --   --   --   --   --  24  --   --  44  --    CR  --  0.89  --   --  0.97  --  0.88  --   --  0.94   < >   GFRESTIMATED  --  >90  --   --  89  --  >90  --   --  83   < >    GFRESTBLACK  --   --   --   --   --   --   --   --   --  >90  --    POTASSIUM  --  4.7  --   --  4.6  --  4.4  --   --  4.2  --     < > = values in this interval not displayed.            Objective    BP (!) 142/84   Pulse 65   Temp 97.1  F (36.2  C) (Tympanic)   Resp 16   Wt 140 kg (308 lb 9.6 oz)   SpO2 95%   BMI 44.28 kg/m    Body mass index is 44.28 kg/m .  Physical Exam   GENERAL: alert and no distress  RESP: lungs clear to auscultation - no rales, rhonchi or wheezes  CV: regular rate and rhythm, normal S1 S2, no S3 or S4, no murmur, click or rub, no peripheral edema   PSYCH: mentation appears normal, affect normal/bright    Results for orders placed or performed in visit on 05/03/24   Hemoglobin A1c     Status: Abnormal   Result Value Ref Range    Hemoglobin A1C 8.9 (H) 4.0 - 6.2 %             Signed Electronically by: Kaiden Chavez MD

## 2024-05-06 ENCOUNTER — TELEPHONE (OUTPATIENT)
Dept: FAMILY MEDICINE | Facility: OTHER | Age: 64
End: 2024-05-06
Payer: COMMERCIAL

## 2024-05-06 DIAGNOSIS — E11.9 TYPE 2 DIABETES MELLITUS WITHOUT COMPLICATION, WITHOUT LONG-TERM CURRENT USE OF INSULIN (H): Primary | ICD-10-CM

## 2024-07-26 ENCOUNTER — OFFICE VISIT (OUTPATIENT)
Dept: FAMILY MEDICINE | Facility: OTHER | Age: 64
End: 2024-07-26
Attending: FAMILY MEDICINE
Payer: COMMERCIAL

## 2024-07-26 VITALS
WEIGHT: 293.6 LBS | BODY MASS INDEX: 42.03 KG/M2 | SYSTOLIC BLOOD PRESSURE: 136 MMHG | RESPIRATION RATE: 18 BRPM | TEMPERATURE: 97.8 F | DIASTOLIC BLOOD PRESSURE: 72 MMHG | HEART RATE: 78 BPM | OXYGEN SATURATION: 95 % | HEIGHT: 70 IN

## 2024-07-26 DIAGNOSIS — G60.9 CHRONIC IDIOPATHIC AXONAL POLYNEUROPATHY: ICD-10-CM

## 2024-07-26 DIAGNOSIS — E11.9 TYPE 2 DIABETES MELLITUS WITHOUT COMPLICATION, WITHOUT LONG-TERM CURRENT USE OF INSULIN (H): Primary | ICD-10-CM

## 2024-07-26 PROCEDURE — 99214 OFFICE O/P EST MOD 30 MIN: CPT | Performed by: FAMILY MEDICINE

## 2024-07-26 PROCEDURE — G2211 COMPLEX E/M VISIT ADD ON: HCPCS | Performed by: FAMILY MEDICINE

## 2024-07-26 RX ORDER — OXYCODONE HYDROCHLORIDE 10 MG/1
10 TABLET ORAL EVERY 4 HOURS PRN
Qty: 180 TABLET | Refills: 0 | Status: SHIPPED | OUTPATIENT
Start: 2024-09-20

## 2024-07-26 RX ORDER — OXYCODONE HYDROCHLORIDE 10 MG/1
10 TABLET ORAL EVERY 4 HOURS PRN
Qty: 180 TABLET | Refills: 0 | Status: SHIPPED | OUTPATIENT
Start: 2024-07-26

## 2024-07-26 RX ORDER — OXYCODONE HYDROCHLORIDE 10 MG/1
10 TABLET ORAL EVERY 4 HOURS PRN
Qty: 180 TABLET | Refills: 0 | Status: SHIPPED | OUTPATIENT
Start: 2024-08-23

## 2024-07-26 ASSESSMENT — PAIN SCALES - GENERAL: PAINLEVEL: SEVERE PAIN (7)

## 2024-07-26 ASSESSMENT — PAIN SCALES - PAIN ENJOYMENT GENERAL ACTIVITY SCALE (PEG)
INTERFERED_ENJOYMENT_LIFE: 7
PEG_TOTALSCORE: 7
AVG_PAIN_PASTWEEK: 7
INTERFERED_GENERAL_ACTIVITY: 7

## 2024-07-26 NOTE — NURSING NOTE
"Chief Complaint   Patient presents with    Recheck Medication     Oxycodone       Initial /72   Pulse 78   Temp 97.8  F (36.6  C) (Temporal)   Resp 18   Ht 1.778 m (5' 10\")   Wt 133.2 kg (293 lb 9.6 oz)   SpO2 95%   BMI 42.13 kg/m   Estimated body mass index is 42.13 kg/m  as calculated from the following:    Height as of this encounter: 1.778 m (5' 10\").    Weight as of this encounter: 133.2 kg (293 lb 9.6 oz).  Medication Review: complete    The next two questions are to help us understand your food security.  If you are feeling you need any assistance in this area, we have resources available to support you today.          2/1/2024   SDOH- Food Insecurity   Within the past 12 months, did you worry that your food would run out before you got money to buy more? N   Within the past 12 months, did the food you bought just not last and you didn t have money to get more? N            Health Care Directive:  Patient does not have a Health Care Directive or Living Will: Discussed advance care planning with patient; however, patient declined at this time.    Lyudmila Miles LPN      "

## 2024-07-26 NOTE — PROGRESS NOTES
"  Assessment & Plan     (E11.9) Type 2 diabetes mellitus without complication, without long-term current use of insulin (H)  (primary encounter diagnosis)  Comment: too early for another A1c, but given he is tolerating the jardiance, will increase his dose.   Plan: empagliflozin (JARDIANCE) 25 MG TABS tablet             (G60.9) Chronic idiopathic axonal polyneuropathy  Comment: very challenging to treat, has tried many modalities over the years, and despite this is having progression of symptoms.  reviewed. Otherwise risk for misuse or abuse. Refilled without changes. We did talk about changing to buprenorphine for pain, and at this time he is not ready.   Plan: Drug Confirmation Panel Urine with Creat,         oxyCODONE IR (ROXICODONE) 10 MG tablet,         oxyCODONE IR (ROXICODONE) 10 MG tablet,         oxyCODONE IR (ROXICODONE) 10 MG tablet                   BMI  Estimated body mass index is 42.13 kg/m  as calculated from the following:    Height as of this encounter: 1.778 m (5' 10\").    Weight as of this encounter: 133.2 kg (293 lb 9.6 oz).             No follow-ups on file.    Ivett Rajput is a 64 year old, presenting for the following health issues:  Recheck Medication (Oxycodone)      7/26/2024    10:43 AM   Additional Questions   Roomed by Danae Miles LPN     History of Present Illness       Reason for visit:  Perscription refills for neuropathy and diabetes    He eats 4 or more servings of fruits and vegetables daily.He consumes 0 sweetened beverage(s) daily.He exercises with enough effort to increase his heart rate 9 or less minutes per day.  He exercises with enough effort to increase his heart rate 3 or less days per week.   He is taking medications regularly.           Diabetes Follow-up    How often are you checking your blood sugar? Not at all  What concerns do you have today about your diabetes? None   Do you have any of these symptoms? (Select all that apply)  Numbness in feet  Have you " had a diabetic eye exam in the last 12 months? No    Started jardiance at the last visit, no significant side effects and willing to increase the dose.     BP Readings from Last 2 Encounters:   07/26/24 136/72   05/03/24 (!) 142/84     Hemoglobin A1C (%)   Date Value   05/03/2024 8.9 (H)   02/02/2024 7.5 (H)   11/12/2019 6.1 (H)     LDL Cholesterol Calculated   Date Value   11/02/2023 98 mg/dL   11/10/2022      Comment:     Cannot estimate LDL when triglyceride exceeds 400 mg/dL   01/07/2020 146 mg/dL (H)   12/04/2019     Cannot estimate LDL when triglyceride exceeds 400 mg/dL mg/dL             Pain History:    Nerve pain getting worse. Feet much worse at night. Sens of boiling water being poured on them. This was triggered as a side effect form his TKA infection and treatment, perhaps antibiotic side effect. No EtOH for over 2 years. Using 1 oxy 1 tylenol and 2 ibuprofen tabs every 4 hours, around the clock.   When did you first notice your pain? After surgery    Have you seen this provider for your pain in the past? Yes   Where in your body do you have pain? feet  Are you seeing anyone else for your pain? No        11/1/2023     1:04 PM 2/1/2024     8:48 AM 5/3/2024    10:23 AM   PHQ-9 SCORE   PHQ-9 Total Score MyChart 2 (Minimal depression) 7 (Mild depression) 4 (Minimal depression)   PHQ-9 Total Score 2 7 4           2/6/2023    11:13 AM 8/2/2023     9:15 AM 10/29/2023     7:10 AM   NEFTALY-7 SCORE   Total Score 0 (minimal anxiety) 0 (minimal anxiety) 0 (minimal anxiety)   Total Score 0 0 0           2/2/2024     9:44 AM 5/3/2024    10:34 AM 7/26/2024    10:46 AM   PEG Score   PEG Total Score 7.67 8 7           Chronic Pain Follow Up:    Location of pain: Feet  Analgesia/pain control:    - Recent changes:  worsening     - Overall control: Tolerable with discomfort    - Current treatments: oxycodone, medical marijuana    Adherence:     - Do you ever take more pain medicine than prescribed? No    - When did you take  "your last dose of pain medicine?  0730 7/26/2024   Adverse effects: No   PDMP Review         Value Time User    State PDMP site checked  Yes 5/3/2024 11:08 AM Kaiden Chavez MD          Last CSA Agreement:   CSA -- Patient Level:     [Media Unavailable] Controlled Substance Agreement - Opioid - Scan on 5/7/2024 12:21 PM   [Media Unavailable] Controlled Substance Agreement - Opioid - Scan on 4/14/2023  2:07 PM   [Media Unavailable] Controlled Substance Agreement - Opioid - Scan on 1/26/2022 12:05 PM: opiod       Last UDS: 8/4/2023                      Objective    /72   Pulse 78   Temp 97.8  F (36.6  C) (Temporal)   Resp 18   Ht 1.778 m (5' 10\")   Wt 133.2 kg (293 lb 9.6 oz)   SpO2 95%   BMI 42.13 kg/m    Body mass index is 42.13 kg/m .  Physical Exam   GENERAL: alert and no distress  RESP: lungs clear to auscultation - no rales, rhonchi or wheezes  CV: regular rate and rhythm, normal S1 S2, no S3 or S4, no murmur, click or rub, no peripheral edema   PSYCH: mentation appears normal, affect normal/bright            Signed Electronically by: Kaiden Chavez MD    "

## 2024-07-30 DIAGNOSIS — E78.1 PURE HYPERGLYCERIDEMIA: ICD-10-CM

## 2024-07-31 RX ORDER — ATORVASTATIN CALCIUM 40 MG/1
40 TABLET, FILM COATED ORAL DAILY
Qty: 90 TABLET | Refills: 0 | Status: SHIPPED | OUTPATIENT
Start: 2024-07-31

## 2024-07-31 NOTE — TELEPHONE ENCOUNTER
[facility] sent Rx request for the following:      Requested Prescriptions   Pending Prescriptions Disp Refills    atorvastatin (LIPITOR) 40 MG tablet [Pharmacy Med Name: atorvastatin 40 mg tablet] 90 tablet 4     Sig: Take 1 tablet (40 mg) by mouth daily   Last Prescription Date:   7/9/23  Last Fill Qty/Refills:         90, R-4    Last Office Visit:                7/26/24 4/29/22 (Pure hypercholesterolemia discussed)    Future Office visit:           10/11/24    Routing to covering provider for refill consideration, as PCP/provider is out of clinic >48 hours or Pt is completely out of medication and provider is out of the clinic today.Unable to complete prescription refill per RN Medication Refill Policy. Arianne Meneses RN .............. 7/31/2024  3:32 PM

## 2024-08-17 ENCOUNTER — HEALTH MAINTENANCE LETTER (OUTPATIENT)
Age: 64
End: 2024-08-17

## 2024-10-11 ENCOUNTER — OFFICE VISIT (OUTPATIENT)
Dept: FAMILY MEDICINE | Facility: OTHER | Age: 64
End: 2024-10-11
Attending: FAMILY MEDICINE
Payer: COMMERCIAL

## 2024-10-11 VITALS
DIASTOLIC BLOOD PRESSURE: 74 MMHG | OXYGEN SATURATION: 95 % | HEART RATE: 70 BPM | SYSTOLIC BLOOD PRESSURE: 130 MMHG | WEIGHT: 297 LBS | RESPIRATION RATE: 14 BRPM | TEMPERATURE: 97.2 F | BODY MASS INDEX: 42.62 KG/M2

## 2024-10-11 DIAGNOSIS — G60.9 CHRONIC IDIOPATHIC AXONAL POLYNEUROPATHY: Primary | ICD-10-CM

## 2024-10-11 DIAGNOSIS — Z11.59 NEED FOR HEPATITIS C SCREENING TEST: ICD-10-CM

## 2024-10-11 DIAGNOSIS — Z11.4 SCREENING FOR HIV (HUMAN IMMUNODEFICIENCY VIRUS): ICD-10-CM

## 2024-10-11 DIAGNOSIS — Z79.4 TYPE 2 DIABETES MELLITUS WITHOUT COMPLICATION, WITH LONG-TERM CURRENT USE OF INSULIN (H): ICD-10-CM

## 2024-10-11 DIAGNOSIS — E11.9 TYPE 2 DIABETES MELLITUS WITHOUT COMPLICATION, WITH LONG-TERM CURRENT USE OF INSULIN (H): ICD-10-CM

## 2024-10-11 LAB
ANION GAP SERPL CALCULATED.3IONS-SCNC: 14 MMOL/L (ref 7–15)
BUN SERPL-MCNC: 16.7 MG/DL (ref 8–23)
CALCIUM SERPL-MCNC: 10.2 MG/DL (ref 8.8–10.4)
CHLORIDE SERPL-SCNC: 102 MMOL/L (ref 98–107)
CHOLEST SERPL-MCNC: 243 MG/DL
CREAT SERPL-MCNC: 0.94 MG/DL (ref 0.67–1.17)
CREAT UR-MCNC: 105 MG/DL
CREAT UR-MCNC: 105.1 MG/DL
EGFRCR SERPLBLD CKD-EPI 2021: >90 ML/MIN/1.73M2
EST. AVERAGE GLUCOSE BLD GHB EST-MCNC: 171 MG/DL
FASTING STATUS PATIENT QL REPORTED: ABNORMAL
FASTING STATUS PATIENT QL REPORTED: ABNORMAL
GLUCOSE SERPL-MCNC: 163 MG/DL (ref 70–99)
HBA1C MFR BLD: 7.6 %
HCO3 SERPL-SCNC: 24 MMOL/L (ref 22–29)
HDLC SERPL-MCNC: 37 MG/DL
LDLC SERPL CALC-MCNC: ABNORMAL MG/DL
MICROALBUMIN UR-MCNC: 32.2 MG/L
MICROALBUMIN/CREAT UR: 30.64 MG/G CR (ref 0–17)
NONHDLC SERPL-MCNC: 206 MG/DL
POTASSIUM SERPL-SCNC: 4.5 MMOL/L (ref 3.4–5.3)
SODIUM SERPL-SCNC: 140 MMOL/L (ref 135–145)
TRIGL SERPL-MCNC: 672 MG/DL

## 2024-10-11 PROCEDURE — 90673 RIV3 VACCINE NO PRESERV IM: CPT | Performed by: FAMILY MEDICINE

## 2024-10-11 PROCEDURE — 80348 DRUG SCREENING BUPRENORPHINE: CPT | Mod: ZL | Performed by: FAMILY MEDICINE

## 2024-10-11 PROCEDURE — 84132 ASSAY OF SERUM POTASSIUM: CPT | Mod: ZL | Performed by: FAMILY MEDICINE

## 2024-10-11 PROCEDURE — 91320 SARSCV2 VAC 30MCG TRS-SUC IM: CPT | Performed by: FAMILY MEDICINE

## 2024-10-11 PROCEDURE — 90471 IMMUNIZATION ADMIN: CPT | Performed by: FAMILY MEDICINE

## 2024-10-11 PROCEDURE — 83036 HEMOGLOBIN GLYCOSYLATED A1C: CPT | Mod: ZL | Performed by: FAMILY MEDICINE

## 2024-10-11 PROCEDURE — 87389 HIV-1 AG W/HIV-1&-2 AB AG IA: CPT | Mod: ZL | Performed by: FAMILY MEDICINE

## 2024-10-11 PROCEDURE — 82465 ASSAY BLD/SERUM CHOLESTEROL: CPT | Mod: ZL | Performed by: FAMILY MEDICINE

## 2024-10-11 PROCEDURE — 90480 ADMN SARSCOV2 VAC 1/ONLY CMP: CPT | Performed by: FAMILY MEDICINE

## 2024-10-11 PROCEDURE — 86803 HEPATITIS C AB TEST: CPT | Mod: ZL | Performed by: FAMILY MEDICINE

## 2024-10-11 PROCEDURE — 36415 COLL VENOUS BLD VENIPUNCTURE: CPT | Mod: ZL | Performed by: FAMILY MEDICINE

## 2024-10-11 PROCEDURE — 80360 METHYLPHENIDATE: CPT | Mod: ZL | Performed by: FAMILY MEDICINE

## 2024-10-11 PROCEDURE — 82043 UR ALBUMIN QUANTITATIVE: CPT | Mod: ZL | Performed by: FAMILY MEDICINE

## 2024-10-11 PROCEDURE — 99214 OFFICE O/P EST MOD 30 MIN: CPT | Mod: 25 | Performed by: FAMILY MEDICINE

## 2024-10-11 PROCEDURE — 80048 BASIC METABOLIC PNL TOTAL CA: CPT | Mod: ZL | Performed by: FAMILY MEDICINE

## 2024-10-11 RX ORDER — OXCARBAZEPINE 150 MG/1
150 TABLET, FILM COATED ORAL 2 TIMES DAILY
Qty: 180 TABLET | Refills: 4 | Status: SHIPPED | OUTPATIENT
Start: 2024-10-11

## 2024-10-11 RX ORDER — OXYCODONE HYDROCHLORIDE 10 MG/1
10 TABLET ORAL EVERY 4 HOURS PRN
Qty: 180 TABLET | Refills: 0 | Status: SHIPPED | OUTPATIENT
Start: 2024-10-11

## 2024-10-11 RX ORDER — OXYCODONE HYDROCHLORIDE 10 MG/1
10 TABLET ORAL EVERY 4 HOURS PRN
Qty: 180 TABLET | Refills: 0 | Status: SHIPPED | OUTPATIENT
Start: 2024-11-08

## 2024-10-11 RX ORDER — OXYCODONE HYDROCHLORIDE 10 MG/1
10 TABLET ORAL EVERY 4 HOURS PRN
Qty: 180 TABLET | Refills: 0 | Status: SHIPPED | OUTPATIENT
Start: 2024-12-06

## 2024-10-11 RX ORDER — LIRAGLUTIDE 6 MG/ML
0.6 INJECTION SUBCUTANEOUS DAILY
Qty: 9 ML | Refills: 4 | Status: SHIPPED | OUTPATIENT
Start: 2024-10-11

## 2024-10-11 ASSESSMENT — PAIN SCALES - GENERAL: PAINLEVEL: EXTREME PAIN (8)

## 2024-10-11 NOTE — NURSING NOTE
"Chief Complaint   Patient presents with    Pain Management       Initial /74   Pulse 70   Temp 97.2  F (36.2  C) (Tympanic)   Resp 14   Wt 134.7 kg (297 lb)   SpO2 95%   BMI 42.62 kg/m   Estimated body mass index is 42.62 kg/m  as calculated from the following:    Height as of 7/26/24: 1.778 m (5' 10\").    Weight as of this encounter: 134.7 kg (297 lb).  Medication Reconciliation: complete          "

## 2024-10-12 LAB
HCV AB SERPL QL IA: NONREACTIVE
HIV 1+2 AB+HIV1 P24 AG SERPL QL IA: NONREACTIVE

## 2024-10-15 DIAGNOSIS — E78.1 PURE HYPERGLYCERIDEMIA: ICD-10-CM

## 2024-10-16 RX ORDER — ATORVASTATIN CALCIUM 40 MG/1
40 TABLET, FILM COATED ORAL DAILY
Qty: 90 TABLET | Refills: 0 | Status: SHIPPED | OUTPATIENT
Start: 2024-10-16

## 2024-10-16 NOTE — TELEPHONE ENCOUNTER
[facility] sent Rx request for the following:      Requested Prescriptions   Pending Prescriptions Disp Refills    atorvastatin (LIPITOR) 40 MG tablet [Pharmacy Med Name: atorvastatin 40 mg tablet] 90 tablet 0     Sig: Take 1 tablet (40 mg) by mouth daily     Last Prescription Date:   7/31/24  Last Fill Qty/Refills:         90, R-0  Pure hyperglyceridemia [E78.1]       Last Office Visit:              10/11/24  Future Office visit:           1/3/25    Per LOV note:  Return in about 3 months (around 1/11/2025).     Prescription refilled per RN Medication Refill Policy.................... Arianne Meneses RN ....................  10/16/2024   10:20 AM     Tiffanie Burdick  1983    Date of admission: 10/1/24    Date of discharge: 10/02/24    DISPOSITION: home    PRINCIPAL DIAGNOSIS:  Morbid Obesity    OTHER DIAGNOSES:  Principal Problem:    Morbid obesity (Multi)  Active Problems:    Morbid obesity due to excess calories (Multi)    PONV (postoperative nausea and vomiting)      Primary Care:     Yaw Gonzalez MD  Other Providers:         Procedures while hospitalized:  Laparoscopic sleeve gastrectomy    PENDING RESULTS:  Pathology results from surgery    Wound care: You may shower and wash your abdomen with mild soap and water. Pat incisions to dry.  Do not soak in a bath or pool for at least 2 weeks. Your incisions are closed with dissolvable stitches and skin glue. The glue will peel off on its own after about 1 week.    Activity: You may walk and climb stairs as tolerated. You should not lie or sit down for more than 1 hour at a time except for when sleeping at night. Activity is important to prevent blood clots. No heavy lifting of objects greater than 10 lbs (or a gallon of milk).    No driving or returning to work until you no longer require narcotic pain medications (Roxicodone).    Diet: You will be on a Phase 2 diet for the next 10-14 days. This includes sugar-free, non-carbonated clear liquids, thick liquids (like strained cream soups, sugar-free pudding, and protein shakes). After about 2 weeks or when cleared by your surgeon/dietician, you may begin the Phase 3 pureed diet (as per your Bariatric Guidebook).    Remember to drink at least 64 oz of liquids per day and try to take in 40-60 grams of protein.     Avoid straws and carbonated beverages. Take small sips every couple of minutes. Your water bottle is your best friend and you shouldn't go anywhere without it!     Warning: If you experience severe pain, fever over 101 degrees F, redness or drainage from incisions, nausea/vomiting that lasts more than 12 hours, rapid heart rate or shortness of breath,  call your surgeon immediately.       Your medication list        START taking these medications        Instructions Last Dose Given Next Dose Due   acetaminophen 325 mg tablet  Commonly known as: Tylenol      Take 2 tablets (650 mg) by mouth every 6 hours.       enoxaparin 40 mg/0.4 mL syringe  Commonly known as: Lovenox      Inject 0.4 mL (40 mg) under the skin 2 times a day for 28 days.       omeprazole 40 mg DR capsule  Commonly known as: PriLOSEC      Take 1 capsule (40 mg) by mouth once daily in the morning. Take before meals. Do not crush or chew. Open capsule, sprinkle beads on SF jello, pudding or applesauce.       ondansetron 4 mg tablet  Commonly known as: Zofran      Take 1 tablet (4 mg) by mouth every 6 hours if needed for nausea or vomiting.       simethicone 80 mg chewable tablet  Commonly known as: Mylicon      Chew 1 tablet (80 mg) every 4 hours if needed for flatulence (gas pain).              CONTINUE taking these medications        Instructions Last Dose Given Next Dose Due   levothyroxine 112 mcg tablet  Commonly known as: Synthroid, Levoxyl      Take 1 tablet (112 mcg) by mouth early in the morning.. Take on an empty stomach at the same time each day, either 30 to 60 minutes prior to breakfast       losartan 25 mg tablet  Commonly known as: Cozaar      Take 1 tablet (25 mg) by mouth once daily.       multivitamin with minerals tablet           oxyCODONE 5 mg immediate release tablet  Commonly known as: Roxicodone      Take 1 tablet (5 mg) by mouth every 6 hours if needed for severe pain or moderate pain for up to 6 days.       SUMAtriptan 100 mg tablet  Commonly known as: Imitrex                     Where to Get Your Medications        Information about where to get these medications is not yet available    Ask your nurse or doctor about these medications  acetaminophen 325 mg tablet  simethicone 80 mg chewable tablet         You do not need to begin taking your vitamins until instructed by your  surgeon at your first follow-up visit.    Please consult your medical doctor regarding adjusting your medications after surgery (particularly diabetic and blood pressure medications).    Follow up with Dr Kwok in her [facility] office on 10/18/24.

## 2024-10-17 LAB
OXYCODONE UR CFM-MCNC: 1540 NG/ML
OXYCODONE/CREAT UR: 1467 NG/MG {CREAT}
OXYMORPHONE UR CFM-MCNC: 240 NG/ML
OXYMORPHONE/CREAT UR: 229 NG/MG {CREAT}

## 2024-10-22 ENCOUNTER — TELEPHONE (OUTPATIENT)
Dept: FAMILY MEDICINE | Facility: OTHER | Age: 64
End: 2024-10-22
Payer: COMMERCIAL

## 2024-10-22 NOTE — TELEPHONE ENCOUNTER
Pharmacy confirms they have the prescription for Trulicity and insurance provides coverage at this time with a co-pay around $60.    Left message to call back...................Tosin Saini LPN  10/22/2024   2:51 PM

## 2024-10-22 NOTE — TELEPHONE ENCOUNTER
Patient would like a call back to discuss why he was not sent the trulicity states that he was told he would be starting it but it was not sent over with the rest of the RX's     Bebe Thompson on 10/22/2024 at 12:08 PM

## 2024-10-25 ENCOUNTER — TRANSFERRED RECORDS (OUTPATIENT)
Dept: HEALTH INFORMATION MANAGEMENT | Facility: OTHER | Age: 64
End: 2024-10-25
Payer: COMMERCIAL

## 2024-10-25 LAB — RETINOPATHY: NEGATIVE

## 2024-10-25 NOTE — TELEPHONE ENCOUNTER
Notified patient of note below and he stated he has picked it up from the pharmacy.  Norma J. Gosselin, LPN .......  10/25/2024  11:06 AM

## 2024-10-26 ENCOUNTER — HEALTH MAINTENANCE LETTER (OUTPATIENT)
Age: 64
End: 2024-10-26

## 2024-11-17 ENCOUNTER — OFFICE VISIT (OUTPATIENT)
Dept: FAMILY MEDICINE | Facility: OTHER | Age: 64
End: 2024-11-17
Attending: REGISTERED NURSE
Payer: COMMERCIAL

## 2024-11-17 ENCOUNTER — HOSPITAL ENCOUNTER (OUTPATIENT)
Dept: GENERAL RADIOLOGY | Facility: OTHER | Age: 64
Discharge: HOME | End: 2024-11-17
Attending: REGISTERED NURSE
Payer: COMMERCIAL

## 2024-11-17 VITALS
OXYGEN SATURATION: 92 % | WEIGHT: 290.8 LBS | HEART RATE: 72 BPM | TEMPERATURE: 98.7 F | SYSTOLIC BLOOD PRESSURE: 148 MMHG | BODY MASS INDEX: 41.73 KG/M2 | RESPIRATION RATE: 16 BRPM | DIASTOLIC BLOOD PRESSURE: 66 MMHG

## 2024-11-17 DIAGNOSIS — J45.20 MILD INTERMITTENT ASTHMA WITHOUT COMPLICATION: ICD-10-CM

## 2024-11-17 DIAGNOSIS — R05.1 ACUTE COUGH: ICD-10-CM

## 2024-11-17 DIAGNOSIS — J06.9 VIRAL UPPER RESPIRATORY TRACT INFECTION WITH COUGH: Primary | ICD-10-CM

## 2024-11-17 PROCEDURE — 71046 X-RAY EXAM CHEST 2 VIEWS: CPT | Mod: TC

## 2024-11-17 RX ORDER — ALBUTEROL SULFATE 90 UG/1
2 INHALANT RESPIRATORY (INHALATION) EVERY 4 HOURS PRN
Qty: 18 G | Refills: 4 | Status: SHIPPED | OUTPATIENT
Start: 2024-11-17

## 2024-11-17 RX ORDER — PREDNISONE 20 MG/1
40 TABLET ORAL DAILY
Qty: 6 TABLET | Refills: 0 | Status: SHIPPED | OUTPATIENT
Start: 2024-11-17 | End: 2024-11-20

## 2024-11-17 ASSESSMENT — ANXIETY QUESTIONNAIRES
GAD7 TOTAL SCORE: 0
3. WORRYING TOO MUCH ABOUT DIFFERENT THINGS: NOT AT ALL
2. NOT BEING ABLE TO STOP OR CONTROL WORRYING: NOT AT ALL
7. FEELING AFRAID AS IF SOMETHING AWFUL MIGHT HAPPEN: NOT AT ALL
4. TROUBLE RELAXING: NOT AT ALL
8. IF YOU CHECKED OFF ANY PROBLEMS, HOW DIFFICULT HAVE THESE MADE IT FOR YOU TO DO YOUR WORK, TAKE CARE OF THINGS AT HOME, OR GET ALONG WITH OTHER PEOPLE?: NOT DIFFICULT AT ALL
1. FEELING NERVOUS, ANXIOUS, OR ON EDGE: NOT AT ALL
5. BEING SO RESTLESS THAT IT IS HARD TO SIT STILL: NOT AT ALL
IF YOU CHECKED OFF ANY PROBLEMS ON THIS QUESTIONNAIRE, HOW DIFFICULT HAVE THESE PROBLEMS MADE IT FOR YOU TO DO YOUR WORK, TAKE CARE OF THINGS AT HOME, OR GET ALONG WITH OTHER PEOPLE: NOT DIFFICULT AT ALL
GAD7 TOTAL SCORE: 0
6. BECOMING EASILY ANNOYED OR IRRITABLE: NOT AT ALL
GAD7 TOTAL SCORE: 0
7. FEELING AFRAID AS IF SOMETHING AWFUL MIGHT HAPPEN: NOT AT ALL

## 2024-11-17 ASSESSMENT — ASTHMA QUESTIONNAIRES
QUESTION_2 LAST FOUR WEEKS HOW OFTEN HAVE YOU HAD SHORTNESS OF BREATH: ONCE OR TWICE A WEEK
QUESTION_1 LAST FOUR WEEKS HOW MUCH OF THE TIME DID YOUR ASTHMA KEEP YOU FROM GETTING AS MUCH DONE AT WORK, SCHOOL OR AT HOME: NONE OF THE TIME
QUESTION_3 LAST FOUR WEEKS HOW OFTEN DID YOUR ASTHMA SYMPTOMS (WHEEZING, COUGHING, SHORTNESS OF BREATH, CHEST TIGHTNESS OR PAIN) WAKE YOU UP AT NIGHT OR EARLIER THAN USUAL IN THE MORNING: NOT AT ALL
ACT_TOTALSCORE: 22
QUESTION_5 LAST FOUR WEEKS HOW WOULD YOU RATE YOUR ASTHMA CONTROL: COMPLETELY CONTROLLED
QUESTION_4 LAST FOUR WEEKS HOW OFTEN HAVE YOU USED YOUR RESCUE INHALER OR NEBULIZER MEDICATION (SUCH AS ALBUTEROL): TWO OR THREE TIMES PER WEEK
ACT_TOTALSCORE: 22

## 2024-11-17 ASSESSMENT — PAIN SCALES - GENERAL: PAINLEVEL_OUTOF10: NO PAIN (0)

## 2024-11-17 ASSESSMENT — PATIENT HEALTH QUESTIONNAIRE - PHQ9
SUM OF ALL RESPONSES TO PHQ QUESTIONS 1-9: 0
10. IF YOU CHECKED OFF ANY PROBLEMS, HOW DIFFICULT HAVE THESE PROBLEMS MADE IT FOR YOU TO DO YOUR WORK, TAKE CARE OF THINGS AT HOME, OR GET ALONG WITH OTHER PEOPLE: NOT DIFFICULT AT ALL
SUM OF ALL RESPONSES TO PHQ QUESTIONS 1-9: 0

## 2024-11-17 NOTE — PATIENT INSTRUCTIONS
I will let you know the results of your chest xray when available.   If your xray shows pneumonia I will treat you with an antibiotic.     Start using your albuterol inhaler 2 puffs every 4 hours as needed for the cough for the next week and especially at night.     Prednisone 40mg daily for 3 days then stop.

## 2024-11-17 NOTE — NURSING NOTE
Pt here for a cough for 2 weeks.  Coughing up thick mucous. Negative COVID test 10 days ago.   Delia Jensen CMA (MA)......................11/17/2024  2:46 PM       Medication Reconciliation: complete    Delia Jensen CMA  11/17/2024 2:46 PM      FOOD SECURITY SCREENING QUESTIONS:    The next two questions are to help us understand your food security.  If you are feeling you need any assistance in this area, we have resources available to support you today.    Hunger Vital Signs:  Within the past 12 months we worried whether our food would run out before we got money to buy more. Never  Within the past 12 months the food we bought just didn't last and we didn't have money to get more. Never  Delia Jensen CMA,LPN on 11/17/2024 at 2:47 PM

## 2024-11-17 NOTE — PROGRESS NOTES
Regulo Roth  1960    ASSESSMENT/PLAN:   1. Viral upper respiratory tract infection with cough (Primary)  2. Mild intermittent asthma without complication  3. Acute cough    - albuterol (PROAIR HFA/PROVENTIL HFA/VENTOLIN HFA) 108 (90 Base) MCG/ACT inhaler; Inhale 2 puffs into the lungs every 4 hours as needed for wheezing.  Dispense: 18 g; Refill: 4  - XR Chest 2 Views  - predniSONE (DELTASONE) 20 MG tablet; Take 2 tablets (40 mg) by mouth daily for 3 days.  Dispense: 6 tablet;     Persistent cough, worse at night.  Wheezing throughout on physical exam today.  Chest x-ray unremarkable.  At home COVID test negative.  Albuterol inhaler every 4 hours as needed for cough  Prednisone burst 40 mg x 3 days.  Discussed emergent signs and symptoms to monitor for and when to seek follow up for any new or worsening symptoms.       Patient agrees with plan of care and verbalizes understating. AVS printed. Patient education provided verbally and written instructions provided as requested.     SUBJECTIVE:   CHIEF COMPLAINT/ REASON FOR VISIT  Patient presents with:  Cough     HISTORY OF PRESENT ILLNESS  Regulo Roth is a pleasant 64 year old male presents to [facility] today for evaluation of 2-week history of persistent cough with thick phlegm production.  Patient had a negative COVID test 10 days ago.  Cough is worse at bedtime.  Has noticed intermittent wheezing.  He has been taking Tylenol.  He did have a  albuterol inhaler at home, he did use this and felt improvement of his symptoms.  Denies fevers, nausea/vomiting or changes in appetite.      I have reviewed the nursing notes.  I have reviewed allergies, medication list, problem list, and past medical history.    REVIEW OF SYSTEMS  See HPI    VITAL SIGNS  Vitals:    24 1453   BP: (!) 148/66   BP Location: Left arm   Patient Position: Sitting   Cuff Size: Adult Large   Pulse: 72   Resp: 16   Temp: 98.7  F (37.1  C)   TempSrc: Tympanic   SpO2:  92%   Weight: 131.9 kg (290 lb 12.8 oz)      Body mass index is 41.73 kg/m .    OBJECTIVE:   PHYSICAL EXAM  Physical Exam  Vitals and nursing note reviewed.   Constitutional:       Appearance: Normal appearance. He is not ill-appearing.   HENT:      Right Ear: Tympanic membrane normal.      Left Ear: Tympanic membrane normal.      Nose: No congestion or rhinorrhea.      Mouth/Throat:      Pharynx: No oropharyngeal exudate or posterior oropharyngeal erythema.   Cardiovascular:      Rate and Rhythm: Normal rate and regular rhythm.   Pulmonary:      Effort: Pulmonary effort is normal.      Breath sounds: Wheezing present.   Lymphadenopathy:      Cervical: No cervical adenopathy.   Skin:     General: Skin is warm and dry.      Findings: No rash.   Neurological:      General: No focal deficit present.      Mental Status: He is alert.   Psychiatric:         Mood and Affect: Mood normal.          DIAGNOSTICS  Results for orders placed or performed in visit on 11/17/24   XR Chest 2 Views     Status: None    Narrative    PROCEDURE INFORMATION:   Exam: XR Chest   Exam date and time: 11/17/2024 3:55 PM   Age: 64 years old   Clinical indication: Acute cough     TECHNIQUE:   Imaging protocol: Radiologic exam of the chest.   Views: 2 views.     COMPARISON:   No relevant prior studies available.     FINDINGS:   Lungs: Unremarkable. No consolidation.   Pleural spaces: Unremarkable. No pleural effusion. No pneumothorax.   Heart/Mediastinum: Unremarkable. No cardiomegaly.   Bones/joints: Moderate degenerative changes in the thoracic spine. No acute   fracture.       Impression    IMPRESSION:   No acute disease    THIS DOCUMENT HAS BEEN ELECTRONICALLY SIGNED BY MD Tila ENG, [facility]  Answers submitted by the patient for this visit:  Patient Health Questionnaire (Submitted on 11/17/2024)  If you checked off any problems, how difficult have these problems made it for you to do  your work, take care of things at home, or get along with other people?: Not difficult at all  PHQ9 TOTAL SCORE: 0  Patient Health Questionnaire (G7) (Submitted on 11/17/2024)  NEFTALY 7 TOTAL SCORE: 0

## 2025-01-03 ENCOUNTER — OFFICE VISIT (OUTPATIENT)
Dept: FAMILY MEDICINE | Facility: OTHER | Age: 65
End: 2025-01-03
Attending: FAMILY MEDICINE
Payer: COMMERCIAL

## 2025-01-03 VITALS
TEMPERATURE: 97.7 F | HEART RATE: 74 BPM | SYSTOLIC BLOOD PRESSURE: 140 MMHG | BODY MASS INDEX: 43.1 KG/M2 | RESPIRATION RATE: 18 BRPM | WEIGHT: 300.4 LBS | DIASTOLIC BLOOD PRESSURE: 86 MMHG | OXYGEN SATURATION: 95 %

## 2025-01-03 DIAGNOSIS — G60.9 CHRONIC IDIOPATHIC AXONAL POLYNEUROPATHY: Primary | ICD-10-CM

## 2025-01-03 DIAGNOSIS — E11.9 TYPE 2 DIABETES MELLITUS WITHOUT COMPLICATION, WITHOUT LONG-TERM CURRENT USE OF INSULIN (H): ICD-10-CM

## 2025-01-03 LAB
EST. AVERAGE GLUCOSE BLD GHB EST-MCNC: 143 MG/DL
HBA1C MFR BLD: 6.6 %

## 2025-01-03 PROCEDURE — 83036 HEMOGLOBIN GLYCOSYLATED A1C: CPT | Mod: ZL | Performed by: FAMILY MEDICINE

## 2025-01-03 PROCEDURE — 36415 COLL VENOUS BLD VENIPUNCTURE: CPT | Mod: ZL | Performed by: FAMILY MEDICINE

## 2025-01-03 RX ORDER — OXYCODONE HYDROCHLORIDE 10 MG/1
10 TABLET ORAL EVERY 4 HOURS PRN
Qty: 180 TABLET | Refills: 0 | Status: SHIPPED | OUTPATIENT
Start: 2025-02-28

## 2025-01-03 RX ORDER — LIRAGLUTIDE 6 MG/ML
1.2 INJECTION SUBCUTANEOUS DAILY
Qty: 18 ML | Refills: 4 | Status: SHIPPED | OUTPATIENT
Start: 2025-01-03

## 2025-01-03 RX ORDER — OXYCODONE HYDROCHLORIDE 10 MG/1
10 TABLET ORAL EVERY 4 HOURS PRN
Qty: 180 TABLET | Refills: 0 | Status: SHIPPED | OUTPATIENT
Start: 2025-01-03

## 2025-01-03 RX ORDER — OXYCODONE HYDROCHLORIDE 10 MG/1
10 TABLET ORAL EVERY 4 HOURS PRN
Qty: 180 TABLET | Refills: 0 | Status: SHIPPED | OUTPATIENT
Start: 2025-01-31

## 2025-01-03 ASSESSMENT — PAIN SCALES - GENERAL: PAINLEVEL_OUTOF10: SEVERE PAIN (6)

## 2025-01-03 NOTE — PROGRESS NOTES
"  Assessment & Plan     (G60.9) Chronic idiopathic axonal polyneuropathy  (primary encounter diagnosis)  Comment: remains stable.  reviewed, low risk for misuse or diversion   Plan: oxyCODONE IR (ROXICODONE) 10 MG tablet,         oxyCODONE IR (ROXICODONE) 10 MG tablet,         oxyCODONE IR (ROXICODONE) 10 MG tablet             (E11.9) Type 2 diabetes mellitus without complication, without long-term current use of insulin (H)  Comment: great control now.  Plan: empagliflozin (JARDIANCE) 25 MG TABS tablet,         liraglutide (VICTOZA) 18 MG/3ML solution,         Hemoglobin A1c        Refilled above without changes           BMI  Estimated body mass index is 43.1 kg/m  as calculated from the following:    Height as of 7/26/24: 1.778 m (5' 10\").    Weight as of this encounter: 136.3 kg (300 lb 6.4 oz).             Return in about 3 months (around 4/3/2025).    Ivett Rajput is a 64 year old, presenting for the following health issues:  Pain Management and Diabetes      1/3/2025     8:48 AM   Additional Questions   Roomed by GLENN Rocha   Accompanied by Self         1/3/2025     8:48 AM   Patient Reported Additional Medications   Patient reports taking the following new medications N/A         History of Present Illness       Reason for visit:  Neuropathy foot pain    He eats 2-3 servings of fruits and vegetables daily.He consumes 0 sweetened beverage(s) daily.He exercises with enough effort to increase his heart rate 20 to 29 minutes per day.  He exercises with enough effort to increase his heart rate 3 or less days per week.   He is taking medications regularly.         Pain History:  When did you first notice your pain? Chronic   Have you seen this provider for your pain in the past? Yes   Where in your body do you have pain? Feet  Are you seeing anyone else for your pain? No        2/1/2024     8:48 AM 5/3/2024    10:23 AM 11/17/2024     2:48 PM   PHQ-9 SCORE   PHQ-9 Total Score MyChart 7 (Mild depression) " 4 (Minimal depression) 0   PHQ-9 Total Score 7 4 0        Proxy-reported           8/2/2023     9:15 AM 10/29/2023     7:10 AM 11/17/2024     2:48 PM   NEFTALY-7 SCORE   Total Score 0 (minimal anxiety) 0 (minimal anxiety) 0 (minimal anxiety)   Total Score 0 0 0        Proxy-reported           7/26/2024    10:46 AM 10/11/2024    10:25 AM 1/3/2025     8:53 AM   PEG Score   PEG Total Score 7 8 6.33           7/26/2024    10:46 AM 10/11/2024    10:25 AM 1/3/2025     8:53 AM   PEG Score   PEG Total Score 7 8 6.33       Chronic Pain Follow Up:    Location of pain: feet and hands  Analgesia/pain control:    - Recent changes:  no    - Overall control: fair to poor    - Current treatments: spinal stimulator, THC, topicals   Adherence:     - Do you ever take more pain medicine than prescribed? No    - When did you take your last dose of pain medicine?  today   Adverse effects: No   PDMP Review         Value Time User    State PDMP site checked  Yes 10/11/2024 10:46 AM Kaiden Chavez MD          Last CSA Agreement:   CSA -- Patient Level:     [Media Unavailable] Controlled Substance Agreement - Opioid - Scan on 5/7/2024 12:21 PM   [Media Unavailable] Controlled Substance Agreement - Opioid - Scan on 4/14/2023  2:07 PM   [Media Unavailable] Controlled Substance Agreement - Opioid - Scan on 1/26/2022 12:05 PM: opiod       Last UDS: 10/17/2024              Diabetes Follow-up    How often are you checking your blood sugar? Not at all  What concerns do you have today about your diabetes? None   Do you have any of these symptoms? (Select all that apply)  Burning in feet, Blurry vision, and Weight loss      BP Readings from Last 2 Encounters:   01/03/25 (!) 140/86   11/17/24 (!) 148/66     Hemoglobin A1C (%)   Date Value   10/11/2024 7.6 (H)   05/03/2024 8.9 (H)   11/12/2019 6.1 (H)     LDL Cholesterol Calculated   Date Value   10/11/2024      Comment:     Cannot estimate LDL when triglyceride exceeds 400 mg/dL   11/02/2023 98 mg/dL    01/07/2020 146 mg/dL (H)   12/04/2019     Cannot estimate LDL when triglyceride exceeds 400 mg/dL mg/dL         How many servings of fruits and vegetables do you eat daily?  2-3  On average, how many sweetened beverages do you drink each day (Examples: soda, juice, sweet tea, etc.  Do NOT count diet or artificially sweetened beverages)?   0  How many days per week do you exercise enough to make your heart beat faster? 3 or less  How many minutes a day do you exercise enough to make your heart beat faster? 20 - 29  How many days per week do you miss taking your medication? 0      Also follow up on diabetes. He is not checking sugars. Started victoza had some GI cramping only for the fist day, and now tolerating it well. Had lost some weight, gained it back over the recent holidays.           Objective    BP (!) 140/86   Pulse 74   Temp 97.7  F (36.5  C) (Temporal)   Resp 18   Wt 136.3 kg (300 lb 6.4 oz)   SpO2 95%   BMI 43.10 kg/m    Body mass index is 43.1 kg/m .  Physical Exam   GENERAL: alert and no distress  PSYCH: mentation appears normal, affect normal/bright    Results for orders placed or performed in visit on 01/03/25   Hemoglobin A1c     Status: Abnormal   Result Value Ref Range    Estimated Average Glucose 143 (H) <117 mg/dL    Hemoglobin A1C 6.6 (H) <5.7 %   \        Signed Electronically by: Kaiden Chavez MD

## 2025-01-03 NOTE — NURSING NOTE
"Chief Complaint   Patient presents with    Pain Management    Diabetes       Initial BP (!) 140/86   Pulse 74   Temp 97.7  F (36.5  C) (Temporal)   Resp 18   Wt 136.3 kg (300 lb 6.4 oz)   SpO2 95%   BMI 43.10 kg/m   Estimated body mass index is 43.1 kg/m  as calculated from the following:    Height as of 7/26/24: 1.778 m (5' 10\").    Weight as of this encounter: 136.3 kg (300 lb 6.4 oz).  Medication Reconciliation: complete          "

## 2025-01-15 DIAGNOSIS — E78.1 PURE HYPERGLYCERIDEMIA: ICD-10-CM

## 2025-01-16 RX ORDER — ATORVASTATIN CALCIUM 40 MG/1
40 TABLET, FILM COATED ORAL DAILY
Qty: 90 TABLET | Refills: 4 | Status: SHIPPED | OUTPATIENT
Start: 2025-01-16

## 2025-03-06 NOTE — PROGRESS NOTES
"  Assessment & Plan     (G60.9) Chronic idiopathic axonal polyneuropathy  (primary encounter diagnosis)  Comment: Reviewed Up To Date with him, and Trileptal was mentioned as possibly helping. Has not tried it without will start it today. Diabetes control will help too.   Plan: oxyCODONE IR (ROXICODONE) 10 MG tablet,         oxyCODONE IR (ROXICODONE) 10 MG tablet,         oxyCODONE IR (ROXICODONE) 10 MG tablet, Drug         Confirmation Panel Urine with Creat,         OXcarbazepine (TRILEPTAL) 150 MG tablet             (Z11.4) Screening for HIV (human immunodeficiency virus)  Comment: neg  Plan: HIV Screening             (Z11.59) Need for hepatitis C screening test  Comment: neg  Plan: Hepatitis C Screen Reflex to HCV RNA Quant and         Genotype             (E11.9,  Z79.4) Type 2 diabetes mellitus without complication, with long-term current use of insulin (H)  Comment: will add on Victoza next.   Plan: OPTOMETRY REFERRAL, HEMOGLOBIN A1C, BASIC         METABOLIC PANEL, Lipid Profile, Albumin Random         Urine Quantitative with Creat Ratio        Much improved control. Triglycerides are very elevated, likely from non fasting state. Will add on victoza to target weight loss, trigs, and get A1c down to under 7. Glycemic control improvements may help neuropathy.           BMI  Estimated body mass index is 42.62 kg/m  as calculated from the following:    Height as of 7/26/24: 1.778 m (5' 10\").    Weight as of this encounter: 134.7 kg (297 lb).             No follow-ups on file.    Subjective   Regulo is a 64 year old, presenting for the following health issues:  Pain Management      10/11/2024    10:22 AM   Additional Questions   Roomed by GLENN Rocha   Accompanied by Self         10/11/2024    10:22 AM   Patient Reported Additional Medications   Patient reports taking the following new medications N/A     History of Present Illness       Diabetes:   He presents for follow up of diabetes.    He is not checking " "blood glucose.         He has no concerns regarding his diabetes at this time.  He is having numbness in feet, burning in feet and weight gain.  The patient has not had a diabetic eye exam in the last 12 months.          Reason for visit:  Blood test check A1C and neuropathy pain    He eats 4 or more servings of fruits and vegetables daily.He consumes 0 sweetened beverage(s) daily.He exercises with enough effort to increase his heart rate 9 or less minutes per day.  He exercises with enough effort to increase his heart rate 3 or less days per week.   He is taking medications regularly.       Recent Labs   Lab Test 05/03/24  1116 02/02/24  1058 11/02/23  1109 04/14/23  1355 11/10/22  1632 07/22/22  1405 11/02/21  1059 01/07/20  0839 11/12/19  1331 04/17/18  1104   0000   A1C 8.9* 7.5* 7.4*   < > 6.5*   < >  --   --    < >  --   --    LDL  --   --  98  --   --   --  155* 146*   < >  --   --    HDL  --   --  43  --  37*  --  42 40   < >  --   --    TRIG  --   --  186*  --  567*  --  260* 326*   < >  --   --    ALT  --   --   --   --   --   --  24  --   --  44  --    CR  --   --  0.89  --  0.97  --  0.88  --   --  0.94   < >   GFRESTIMATED  --   --  >90  --  89  --  >90  --   --  83   < >   GFRESTBLACK  --   --   --   --   --   --   --   --   --  >90  --    POTASSIUM  --   --  4.7  --  4.6  --  4.4  --   --  4.2  --     < > = values in this interval not displayed.            His foot pain is getting worse, at night. \"Feels like I am in pot of boiling water\". Sleeping about 1-2 hours at a time only. Getting harder to get jobs done around the house. Has tried neurotin, lyrica, cymbalta, elavil. Got on higher doses of all of these. Got side effects without relief. Uses biofreeze and vapospray topically. His stimulator jimenez about 3-6 months of battery left. Is getting this worked up to replace it.     Pain History:  When did you first notice your pain? Chronic   Have you seen this provider for your pain in the past? Yes " [FreeTextEntry1] : The patient is a 42-year-old female Factor V, preeclampsia at 24 weeks 4 years ago now 13 weeks gestation who is doing well. #1 CV- nl ECG, ECHO and exercise stress test normal  #2 HTN- blood pressure normal #3 Heme- c/w aspirin for both Factor V and preeclampsia #4 Ob- 13 weeks gestation, c/w prenatal, aspirin, folic acid   Where in your body do you have pain? Feet  Are you seeing anyone else for your pain? No        11/1/2023     1:04 PM 2/1/2024     8:48 AM 5/3/2024    10:23 AM   PHQ-9 SCORE   PHQ-9 Total Score MyChart 2 (Minimal depression) 7 (Mild depression) 4 (Minimal depression)   PHQ-9 Total Score 2 7 4           2/6/2023    11:13 AM 8/2/2023     9:15 AM 10/29/2023     7:10 AM   NEFTALY-7 SCORE   Total Score 0 (minimal anxiety) 0 (minimal anxiety) 0 (minimal anxiety)   Total Score 0 0 0           5/3/2024    10:34 AM 7/26/2024    10:46 AM 10/11/2024    10:25 AM   PEG Score   PEG Total Score 8 7 8           5/3/2024    10:34 AM 7/26/2024    10:46 AM 10/11/2024    10:25 AM   PEG Score   PEG Total Score 8 7 8       Chronic Pain Follow Up:    Location of pain: feet  Analgesia/pain control:    - Recent changes:  no    - Overall control: Inadequate pain control    - Current treatments: THC, opiates.    Adherence:     - Do you ever take more pain medicine than prescribed? No    - When did you take your last dose of pain medicine?  today   Adverse effects: No   PDMP Review         Value Time User    State PDMP site checked  Yes 7/26/2024 11:18 AM Kaiden Chavez MD          Last CSA Agreement:   CSA -- Patient Level:     [Media Unavailable] Controlled Substance Agreement - Opioid - Scan on 5/7/2024 12:21 PM   [Media Unavailable] Controlled Substance Agreement - Opioid - Scan on 4/14/2023  2:07 PM   [Media Unavailable] Controlled Substance Agreement - Opioid - Scan on 1/26/2022 12:05 PM: opiod       Last UDS: 8/4/2023                        Objective    /74   Pulse 70   Temp 97.2  F (36.2  C) (Tympanic)   Resp 14   Wt 134.7 kg (297 lb)   SpO2 95%   BMI 42.62 kg/m    Body mass index is 42.62 kg/m .  Physical Exam   GENERAL: alert and no distress  RESP: lungs clear to auscultation - no rales, rhonchi or wheezes  CV: regular rate and rhythm, normal S1 S2, no S3 or S4, no murmur, click or rub, no peripheral edema   MS: no gross  musculoskeletal defects noted, no edema  NEURO: Normal strength and tone, mentation intact and speech normal  PSYCH: mentation appears normal, affect flat, tearful, fatigued, judgement and insight intact, and appearance well groomed    Diabetic Foot Screen:  Any complaints of increased pain or numbness ? No  Is there a foot ulcer now or a history of foot ulcer? No  Does the foot have an abnormal shape? No  Are the nails thick, too long or ingrown? No  Are there any redness or open areas? No         Sensation Testing done at all points on the diagram with monofilament     Right Foot: Sensation Normal at all points, 1, 2, 3, 4, 5, 6, 7, and 8  Left Foot: Sensation Normal at all points, 1, 2, 3, 4, 5, 6, 7, and 8     Risk Category: 1- Loss of protective sensation with normal appearing foot (no weakness, deformity, callous, pre-ulcer or h/o ulceration)  Performed by Kaiden Chavez MD      Results for orders placed or performed in visit on 10/11/24   HIV Screening     Status: Normal   Result Value Ref Range    HIV Antigen Antibody Combo Nonreactive Nonreactive   Hepatitis C Screen Reflex to HCV RNA Quant and Genotype     Status: Normal   Result Value Ref Range    Hepatitis C Antibody Nonreactive Nonreactive   HEMOGLOBIN A1C     Status: Abnormal   Result Value Ref Range    Estimated Average Glucose 171 (H) <117 mg/dL    Hemoglobin A1C 7.6 (H) <5.7 %   BASIC METABOLIC PANEL     Status: Abnormal   Result Value Ref Range    Sodium 140 135 - 145 mmol/L    Potassium 4.5 3.4 - 5.3 mmol/L    Chloride 102 98 - 107 mmol/L    Carbon Dioxide (CO2) 24 22 - 29 mmol/L    Anion Gap 14 7 - 15 mmol/L    Urea Nitrogen 16.7 8.0 - 23.0 mg/dL    Creatinine 0.94 0.67 - 1.17 mg/dL    GFR Estimate >90 >60 mL/min/1.73m2    Calcium 10.2 8.8 - 10.4 mg/dL    Glucose 163 (H) 70 - 99 mg/dL    Patient Fasting > 8hrs? Unknown    Lipid Profile     Status: Abnormal   Result Value Ref Range    Cholesterol 243 (H) <200 mg/dL    Triglycerides 672 (H) <150  mg/dL    Direct Measure HDL 37 (L) >=40 mg/dL    LDL Cholesterol Calculated      Non HDL Cholesterol 206 (H) <130 mg/dL    Patient Fasting > 8hrs? Unknown     Narrative    Cholesterol  Desirable: < 200 mg/dL  Borderline High: 200 - 239 mg/dL  High: >= 240 mg/dL    Triglycerides  Normal: < 150 mg/dL  Borderline High: 150 - 199 mg/dL  High: 200-499 mg/dL  Very High: >= 500 mg/dL    Direct Measure HDL  Female: >= 50 mg/dL   Male: >= 40 mg/dL    LDL Cholesterol  Desirable: < 100 mg/dL  Above Desirable: 100 - 129 mg/dL   Borderline High: 130 - 159 mg/dL   High:  160 - 189 mg/dL   Very High: >= 190 mg/dL    Non HDL Cholesterol  Desirable: < 130 mg/dL  Above Desirable: 130 - 159 mg/dL  Borderline High: 160 - 189 mg/dL  High: 190 - 219 mg/dL  Very High: >= 220 mg/dL   Albumin Random Urine Quantitative with Creat Ratio     Status: Abnormal   Result Value Ref Range    Creatinine Urine mg/dL 105.1 mg/dL    Albumin Urine mg/L 32.2 mg/L    Albumin Urine mg/g Cr 30.64 (H) 0.00 - 17.00 mg/g Cr   Urine Creatinine for Drug Screen Panel     Status: None   Result Value Ref Range    Creatinine Urine for Drug Screen 105 mg/dL   Drug Confirmation Panel Urine with Creat     Status: None (In process)    Narrative    The following orders were created for panel order Drug Confirmation Panel Urine with Creat.  Procedure                               Abnormality         Status                     ---------                               -----------         ------                     Urine Drug Confirmation ...[[id number]]                      In process                 Urine Creatinine for Jaime...[[id number]]                      Final result                 Please view results for these tests on the individual orders.               Signed Electronically by: Kaiden Chavez MD

## 2025-03-20 ENCOUNTER — TELEPHONE (OUTPATIENT)
Dept: FAMILY MEDICINE | Facility: OTHER | Age: 65
End: 2025-03-20
Payer: COMMERCIAL

## 2025-03-20 NOTE — TELEPHONE ENCOUNTER
Was going to call patient to inform him that he needs a pre-op appt, however, he is already scheduled.

## 2025-04-03 ENCOUNTER — OFFICE VISIT (OUTPATIENT)
Dept: FAMILY MEDICINE | Facility: OTHER | Age: 65
End: 2025-04-03
Attending: FAMILY MEDICINE
Payer: COMMERCIAL

## 2025-04-03 VITALS
WEIGHT: 308 LBS | SYSTOLIC BLOOD PRESSURE: 150 MMHG | HEIGHT: 71 IN | RESPIRATION RATE: 18 BRPM | TEMPERATURE: 97 F | DIASTOLIC BLOOD PRESSURE: 76 MMHG | BODY MASS INDEX: 43.12 KG/M2 | OXYGEN SATURATION: 93 % | HEART RATE: 80 BPM

## 2025-04-03 DIAGNOSIS — Z01.818 PRE-OP EXAM: Primary | ICD-10-CM

## 2025-04-03 DIAGNOSIS — F33.0 MAJOR DEPRESSIVE DISORDER, RECURRENT EPISODE, MILD: ICD-10-CM

## 2025-04-03 DIAGNOSIS — E11.9 TYPE 2 DIABETES MELLITUS WITHOUT COMPLICATION, WITHOUT LONG-TERM CURRENT USE OF INSULIN (H): ICD-10-CM

## 2025-04-03 DIAGNOSIS — K21.9 GASTROESOPHAGEAL REFLUX DISEASE, UNSPECIFIED WHETHER ESOPHAGITIS PRESENT: ICD-10-CM

## 2025-04-03 DIAGNOSIS — G60.9 CHRONIC IDIOPATHIC AXONAL POLYNEUROPATHY: ICD-10-CM

## 2025-04-03 LAB
ANION GAP SERPL CALCULATED.3IONS-SCNC: 13 MMOL/L (ref 7–15)
ATRIAL RATE - MUSE: 71 BPM
BUN SERPL-MCNC: 15.2 MG/DL (ref 8–23)
CALCIUM SERPL-MCNC: 9.7 MG/DL (ref 8.8–10.4)
CHLORIDE SERPL-SCNC: 104 MMOL/L (ref 98–107)
CREAT SERPL-MCNC: 0.96 MG/DL (ref 0.67–1.17)
DIASTOLIC BLOOD PRESSURE - MUSE: NORMAL MMHG
EGFRCR SERPLBLD CKD-EPI 2021: 89 ML/MIN/1.73M2
ERYTHROCYTE [DISTWIDTH] IN BLOOD BY AUTOMATED COUNT: 12.5 % (ref 10–15)
EST. AVERAGE GLUCOSE BLD GHB EST-MCNC: 148 MG/DL
GLUCOSE SERPL-MCNC: 170 MG/DL (ref 70–99)
HBA1C MFR BLD: 6.8 %
HCO3 SERPL-SCNC: 22 MMOL/L (ref 22–29)
HCT VFR BLD AUTO: 45.4 % (ref 40–53)
HGB BLD-MCNC: 16 G/DL (ref 13.3–17.7)
INTERPRETATION ECG - MUSE: NORMAL
MCH RBC QN AUTO: 32.5 PG (ref 26.5–33)
MCHC RBC AUTO-ENTMCNC: 35.2 G/DL (ref 31.5–36.5)
MCV RBC AUTO: 92 FL (ref 78–100)
P AXIS - MUSE: 31 DEGREES
PLATELET # BLD AUTO: 199 10E3/UL (ref 150–450)
POTASSIUM SERPL-SCNC: 4.5 MMOL/L (ref 3.4–5.3)
PR INTERVAL - MUSE: 154 MS
QRS DURATION - MUSE: 94 MS
QT - MUSE: 382 MS
QTC - MUSE: 415 MS
R AXIS - MUSE: 40 DEGREES
RBC # BLD AUTO: 4.93 10E6/UL (ref 4.4–5.9)
SODIUM SERPL-SCNC: 139 MMOL/L (ref 135–145)
SYSTOLIC BLOOD PRESSURE - MUSE: NORMAL MMHG
T AXIS - MUSE: 60 DEGREES
VENTRICULAR RATE- MUSE: 71 BPM
WBC # BLD AUTO: 7.3 10E3/UL (ref 4–11)

## 2025-04-03 PROCEDURE — 83036 HEMOGLOBIN GLYCOSYLATED A1C: CPT | Mod: ZL | Performed by: FAMILY MEDICINE

## 2025-04-03 PROCEDURE — 84132 ASSAY OF SERUM POTASSIUM: CPT | Mod: ZL | Performed by: FAMILY MEDICINE

## 2025-04-03 PROCEDURE — 85014 HEMATOCRIT: CPT | Mod: ZL | Performed by: FAMILY MEDICINE

## 2025-04-03 PROCEDURE — 80048 BASIC METABOLIC PNL TOTAL CA: CPT | Mod: ZL | Performed by: FAMILY MEDICINE

## 2025-04-03 PROCEDURE — 36415 COLL VENOUS BLD VENIPUNCTURE: CPT | Mod: ZL | Performed by: FAMILY MEDICINE

## 2025-04-03 RX ORDER — MIRTAZAPINE 30 MG/1
30 TABLET, FILM COATED ORAL AT BEDTIME
Qty: 90 TABLET | Refills: 4 | Status: SHIPPED | OUTPATIENT
Start: 2025-04-03 | End: 2025-04-03

## 2025-04-03 RX ORDER — OXYCODONE HYDROCHLORIDE 10 MG/1
10 TABLET ORAL EVERY 4 HOURS PRN
Qty: 180 TABLET | Refills: 0 | Status: SHIPPED | OUTPATIENT
Start: 2025-04-03

## 2025-04-03 RX ORDER — MIRTAZAPINE 30 MG/1
30 TABLET, FILM COATED ORAL AT BEDTIME
Qty: 90 TABLET | Refills: 4 | Status: SHIPPED | OUTPATIENT
Start: 2025-04-03

## 2025-04-03 RX ORDER — OXYCODONE HYDROCHLORIDE 10 MG/1
10 TABLET ORAL EVERY 4 HOURS PRN
Qty: 180 TABLET | Refills: 0 | Status: SHIPPED | OUTPATIENT
Start: 2025-05-01

## 2025-04-03 RX ORDER — OXYCODONE HYDROCHLORIDE 10 MG/1
10 TABLET ORAL EVERY 4 HOURS PRN
Qty: 180 TABLET | Refills: 0 | Status: SHIPPED | OUTPATIENT
Start: 2025-05-01 | End: 2025-04-03

## 2025-04-03 RX ORDER — OXYCODONE HYDROCHLORIDE 10 MG/1
10 TABLET ORAL EVERY 4 HOURS PRN
Qty: 180 TABLET | Refills: 0 | Status: SHIPPED | OUTPATIENT
Start: 2025-05-29

## 2025-04-03 RX ORDER — OMEPRAZOLE 40 MG/1
40 CAPSULE, DELAYED RELEASE ORAL DAILY
Qty: 90 CAPSULE | Refills: 4 | Status: SHIPPED | OUTPATIENT
Start: 2025-04-03

## 2025-04-03 ASSESSMENT — PAIN SCALES - GENERAL: PAINLEVEL_OUTOF10: SEVERE PAIN (7)

## 2025-04-03 NOTE — PROGRESS NOTES
Preoperative Evaluation  [facility]  [address]  [place] [address]  Phone: [phone]  Fax: [phone]  Primary Provider: Kaiden Chavez MD  Pre-op Performing Provider: Kaiden Chavez MD  Apr 3, 2025   {Provider  Link to PREOP SmartSet  REQUIRED to apply standard patient instructions and medication directions to the AVS :122617}  {ROOMER review and update patient entered surgical information if needed :229317}        3/29/2025   Surgical Information   What procedure is being done? Replace DRG implant   Facility or Hospital where procedure/surgery will be performed: [facility]   Who is doing the procedure / surgery? Dr. Parr   Date of surgery / procedure: April 22, 2025   Time of surgery / procedure: 12:00pm   Where do you plan to recover after surgery? at home with family     Fax number for surgical facility: [phone]    Assessment & Plan     The proposed surgical procedure is considered LOW risk.    (Z01.818) Pre-op exam  (primary encounter diagnosis)  Comment: see below. Low risk for anesthesia.   Plan: EKG 12-lead, tracing only (Same Day),         Hemoglobin A1c, CBC W PLT No Diff             (G60.9) Chronic idiopathic axonal polyneuropathy  Comment: ***  Plan: oxyCODONE IR (ROXICODONE) 10 MG tablet,         oxyCODONE IR (ROXICODONE) 10 MG tablet,         oxyCODONE IR (ROXICODONE) 10 MG tablet,         DISCONTINUED: oxyCODONE IR (ROXICODONE) 10 MG         tablet        ***    (K21.9) Gastroesophageal reflux disease, unspecified whether esophagitis present  Comment: ***  Plan: omeprazole (PRILOSEC) 40 MG DR capsule        ***    (F33.0) Major depressive disorder, recurrent episode, mild  Comment: ***  Plan: mirtazapine (REMERON) 30 MG tablet        ***    (E11.9) Type 2 diabetes mellitus without complication, without long-term current use of insulin (H)  Comment: ***  Plan: Hemoglobin A1c, CBC W PLT No Diff, Basic         Metabolic Panel        ***             - No identified additional risk factors other than previously addressed    Antiplatelet or Anticoagulation Medication Instructions   - We reviewed the medication list and the patient is not on an antiplatelet or anticoagulation medications.    Additional Medication Instructions   - SGLT2 Inhibitor (canagliflozin, dapagliflozin, or empagliflozin): DO NOT TAKE 3 days before surgery.    - GLP-1 Injectable (exenitide, liraglutide, semaglutide, dulaglutide, etc.): DO NOT TAKE 7 days before surgery     Recommendation  Approval given to proceed with proposed procedure, without further diagnostic evaluation.    Ivett Rajput is a 64 year old, presenting for the following:  Pre-Op Exam          4/3/2025    10:13 AM   Additional Questions   Roomed by dorita gonzalez lpn     HPI: he has had significant pain  in his feet, neuropathic. This is related to IV antibiotics he had after an infected TKA. Significant work up over the years, and lately has been maintained mostly on high dose opiates. Now seeing [facility] clinic, will be having his spinal stimulator battery and leads replaced. Then, they have offered him a possible intrathecal pain pump. Has used many different antiepileptic meds as well. On tylenol and NSAID too.  Current pain level is 6-7 after the opiate dose, and 10/10 at night. Waking up at night for a dose.       His asthma has been really well controlled. Triggered mostly by URI's, and pollen in spring. Had a flare last 11/2024. Albuterol works great for him.       3/29/2025   Pre-Op Questionnaire   Have you ever had a heart attack or stroke? No   Have you ever had surgery on your heart or blood vessels, such as a stent placement, a coronary artery bypass, or surgery on an artery in your head, neck, heart, or legs? No   Do you have chest pain with activity? No   Do you have a history of heart failure? No   Do you currently have a cold, bronchitis or symptoms of other infection? No   Do you have a cough, shortness of  breath, or wheezing? No   Do you or anyone in your family have previous history of blood clots? No   Do you or does anyone in your family have a serious bleeding problem such as prolonged bleeding following surgeries or cuts? No   Have you ever had problems with anemia or been told to take iron pills? No   Have you had any abnormal blood loss such as black, tarry or bloody stools? No   Have you ever had a blood transfusion? No   Are you willing to have a blood transfusion if it is medically needed before, during, or after your surgery? Yes   Have you or any of your relatives ever had problems with anesthesia? No   Do you have sleep apnea, excessive snoring or daytime drowsiness? (!) YES   Do you have a CPAP machine? Yes   Do you have any artifical heart valves or other implanted medical devices like a pacemaker, defibrillator, or continuous glucose monitor? No   Do you have artificial joints? (!) YES   Are you allergic to latex? No     Health Care Directive  Patient does not have a Health Care Directive: Discussed advance care planning with patient; information given to patient to review.    Preoperative Review of    reviewed - controlled substances reflected in medication list.  {Review [place]MP for all patients per [employer] MNPMP Profile:556138}    {Chronic problem details (Optional) :163407}    Patient Active Problem List    Diagnosis Date Noted    F11.9 - Chronic, continuous use of opioids 04/14/2023     Priority: Medium    Diabetes mellitus, type 2 (H) 07/22/2022     Priority: Medium    Morbid obesity (H) 04/14/2021     Priority: Medium    Hypertriglyceridemia 12/04/2019     Priority: Medium    Benign prostatic hyperplasia with urinary retention 06/27/2019     Priority: Medium    Epidural lipomatosis 08/04/2017     Priority: Medium    Chronic idiopathic axonal polyneuropathy 06/21/2017     Priority: Medium    Neuropathy 06/17/2016     Priority: Medium    Sleep apnea, obstructive 06/30/2015     Priority: Medium     Controlled substance agreement signed 6/27/19 09/04/2014     Priority: Medium     Overview:   Updated 6/3/2016      Pain medication agreement 09/04/2014     Priority: Medium     Formatting of this note might be different from the original.  Updated 6/3/2016      GERD (gastroesophageal reflux disease) 11/25/2013     Priority: Medium    Health care maintenance 11/25/2013     Priority: Medium    Vitamin D deficiency 11/15/2013     Priority: Medium    Paresthesia of bilateral legs 11/11/2013     Priority: Medium    Mild intermittent asthma without complication 02/22/2012     Priority: Medium    Eye irritation 10/06/2011     Priority: Medium    Osteoarthritis, knee 06/22/2011     Priority: Medium    Plantar fascial fibromatosis 06/22/2011     Priority: Medium    Seborrheic dermatitis 06/22/2011     Priority: Medium      Past Medical History:   Diagnosis Date    Dermatitis     No Comments Provided    Gastro-esophageal reflux disease without esophagitis     No Comments Provided    Other seasonal allergic rhinitis     No Comments Provided    Primary osteoarthritis of one knee     No Comments Provided    Uncomplicated asthma     No Comments Provided     Past Surgical History:   Procedure Laterality Date    ARTHROPLASTY KNEE      August 2012,left - became infected require debridment    ARTHROSCOPY KNEE      1981,acl and medial meniscus repair [Other]    COLONOSCOPY  12/09/2013    ,F/U 2023    ESOPHAGOSCOPY, GASTROSCOPY, DUODENOSCOPY (EGD), COMBINED      12/9/13,EGD    FUSION CERVICAL ANTERIOR ONE LEVEL      c6-7    INSERT STIMULATOR AND LEADS INTERNAL DORSAL COLUMN  04/2018    OTHER SURGICAL HISTORY      ,HERNIA REPAIR    OTHER SURGICAL HISTORY      ,HERNIA REPAIR    OTHER SURGICAL HISTORY      2012,205436,DEBRIDEMENT,Left,knee  infection following surgery    OTHER SURGICAL HISTORY      2012,41237.3,VA KNEE MANIPULATION    OTHER SURGICAL HISTORY      206904,MULTIPLE SLEEP LATENCY TEST WITHOUT CPAP,uses C PAP     TONSILLECTOMY, ADENOIDECTOMY, COMBINED      No Comments Provided     Current Outpatient Medications   Medication Sig Dispense Refill    albuterol (PROAIR HFA/PROVENTIL HFA/VENTOLIN HFA) 108 (90 Base) MCG/ACT inhaler Inhale 2 puffs into the lungs every 4 hours as needed for wheezing. 18 g 4    atorvastatin (LIPITOR) 40 MG tablet TAKE 1 TABLET BY MOUTH DAILY 90 tablet 4    empagliflozin (JARDIANCE) 25 MG TABS tablet Take 1 tablet (25 mg) by mouth daily. 90 tablet 4    insulin pen needle (31G X 8 MM) 31G X 8 MM miscellaneous Use 1 pen needles daily or as directed. 100 each 4    liraglutide (VICTOZA) 18 MG/3ML solution Inject 1.2 mg subcutaneously daily. 18 mL 4    mirtazapine (REMERON) 30 MG tablet Take 1 tablet (30 mg) by mouth at bedtime 90 tablet 4    naloxone (NARCAN) 4 MG/0.1ML nasal spray Spray 1 spray (4 mg) into one nostril alternating nostrils once as needed for opioid reversal every 2-3 minutes until assistance arrives 0.2 mL 3    omeprazole (PRILOSEC) 40 MG DR capsule Take 1 capsule (40 mg) by mouth daily 90 capsule 4    order for DME CPAP, heated humidifier, mask, headgear, filters and tubing. For home use. Pressure:  8   Length of Need:      OXcarbazepine (TRILEPTAL) 150 MG tablet Take 1 tablet (150 mg) by mouth 2 times daily. 180 tablet 4    oxyCODONE IR (ROXICODONE) 10 MG tablet Take 1 tablet (10 mg) by mouth every 4 hours as needed for severe pain. Max of 6 daily. 180 tablet 0    oxyCODONE IR (ROXICODONE) 10 MG tablet Take 1 tablet (10 mg) by mouth every 4 hours as needed for severe pain. Max 6 daily, 180 tablet 0    oxyCODONE IR (ROXICODONE) 10 MG tablet Take 1 tablet (10 mg) by mouth every 4 hours as needed for severe pain. Max 6 daily. 180 tablet 0       Allergies   Allergen Reactions    Other Environmental Allergy      seasonal        Social History     Tobacco Use    Smoking status: Former     Current packs/day: 1.00     Average packs/day: 1 pack/day for 18.0 years (18.0 ttl pk-yrs)     Types:  "Cigarettes    Smokeless tobacco: Never   Substance Use Topics    Alcohol use: Not Currently     Alcohol/week: 8.3 standard drinks of alcohol     Types: 8 Standard drinks or equivalent per week     {FAMILY HISTORY (Optional):850950700}  History   Drug Use    Types: Marijuana     Comment: sublingual spray-green card           {ROS Picklists (Optional):297906}    Objective    BP (!) 150/76 (BP Location: Right arm, Patient Position: Sitting, Cuff Size: Adult Large)   Pulse 80   Temp 97  F (36.1  C) (Tympanic)   Resp 18   Ht 1.791 m (5' 10.5\")   Wt (!) 139.7 kg (308 lb)   SpO2 93%   BMI 43.57 kg/m     Estimated body mass index is 43.57 kg/m  as calculated from the following:    Height as of this encounter: 1.791 m (5' 10.5\").    Weight as of this encounter: 139.7 kg (308 lb).  Physical Exam  GENERAL: alert and no distress  EYES: Eyes grossly normal to inspection, PERRL and conjunctivae and sclerae normal  HENT: ear canals and TM's normal, nose and mouth without ulcers or lesions  NECK: no adenopathy, no asymmetry, masses, or scars  RESP: lungs clear to auscultation - no rales, rhonchi or wheezes  CV: regular rate and rhythm, normal S1 S2, no S3 or S4, no murmur, click or rub, no peripheral edema  ABDOMEN: soft, nontender, no hepatosplenomegaly, no masses and bowel sounds normal  MS: no gross musculoskeletal defects noted, no edema  SKIN: no suspicious lesions or rashes  NEURO: Normal strength and tone, mentation intact and speech normal  PSYCH: mentation appears normal, affect normal/bright    Recent Labs   Lab Test 25  0954 10/11/24  1121   NA  --  140   POTASSIUM  --  4.5   CR  --  0.94   A1C 6.6* 7.6*        Diagnostics  {LABS:187146}   {EK}    Revised Cardiac Risk Index (RCRI)  The patient has the following serious cardiovascular risks for perioperative complications:   - No serious cardiac risks = 0 points     RCRI Interpretation: 0 points: Class I (very low risk - 0.4% complication rate)     "     Signed Electronically by: Kaiden Chavez MD  A copy of this evaluation report is provided to the requesting physician.    {Provider Resources  [facility]  Revised Cardiac Risk Index :942409}   {Email feedback regarding this note to [email or web address]   :741395}  Answers submitted by the patient for this visit:  General Questionnaire (Submitted on 3/29/2025)  Chief Complaint: Chronic problems general questions HPI Form  What is the reason for your visit today? : Neuropathy pain medication  How many servings of fruits and vegetables do you eat daily?: 2-3  On average, how many sweetened beverages do you drink each day (Examples: soda, juice, sweet tea, etc.  Do NOT count diet or artificially sweetened beverages)?: 0  How many minutes a day do you exercise enough to make your heart beat faster?: 9 or less  How many days a week do you exercise enough to make your heart beat faster?: 3 or less  How many days per week do you miss taking your medication?: 0  Questionnaire about: Chronic problems general questions HPI Form (Submitted on 3/29/2025)  Chief Complaint: Chronic problems general questions HPI Form

## 2025-04-03 NOTE — PATIENT INSTRUCTIONS
- SGLT2 Inhibitor (canagliflozin, dapagliflozin, or empagliflozin): DO NOT TAKE 3 days before surgery.    - GLP-1 Injectable (exenitide, liraglutide, semaglutide, dulaglutide, etc.): DO NOT TAKE 7 days before surgery

## 2025-04-03 NOTE — NURSING NOTE
"Patient presents to the clinic for pre-op.    FOOD SECURITY SCREENING QUESTIONS:    The next two questions are to help us understand your food security.  If you are feeling you need any assistance in this area, we have resources available to support you today.    Hunger Vital Signs:  Within the past 12 months we worried whether our food would run out before we got money to buy more. Never  Within the past 12 months the food we bought just didn't last and we didn't have money to get more. Never    Advance Care Directive on file? no  Advance Care Directive provided to patient? Declined.      Chief Complaint   Patient presents with    Pre-Op Exam       Initial BP (!) 150/76 (BP Location: Right arm, Patient Position: Sitting, Cuff Size: Adult Large)   Pulse 80   Temp 97  F (36.1  C) (Tympanic)   Resp 18   Ht 1.791 m (5' 10.5\")   Wt (!) 139.7 kg (308 lb)   SpO2 93%   BMI 43.57 kg/m   Estimated body mass index is 43.57 kg/m  as calculated from the following:    Height as of this encounter: 1.791 m (5' 10.5\").    Weight as of this encounter: 139.7 kg (308 lb).  Medication Reconciliation: complete        Tosin Saini LPN     "

## 2025-04-06 NOTE — TELEPHONE ENCOUNTER
[facility] sent Rx request for the following:      omeprazole 40 mg capsule,delayed release  Sig: Take 1 capsule (40 mg) by mouth every morning (before breakfast) Take 30-60 minutes before eating.      Last Prescription Date:   5/19/2020  Last Fill Qty/Refills:         90, R-3    Last Office Visit:              4/14/2021   Future Office visit:           none    Prescription refilled per RN Medication Refill Policy.................... Anibal Crabtree RN ....................  5/13/2021   9:28 AM           English

## 2025-04-29 ENCOUNTER — TRANSFERRED RECORDS (OUTPATIENT)
Dept: HEALTH INFORMATION MANAGEMENT | Facility: OTHER | Age: 65
End: 2025-04-29
Payer: COMMERCIAL

## 2025-05-12 ENCOUNTER — TRANSFERRED RECORDS (OUTPATIENT)
Dept: HEALTH INFORMATION MANAGEMENT | Facility: OTHER | Age: 65
End: 2025-05-12
Payer: COMMERCIAL

## 2025-05-15 ASSESSMENT — ASTHMA QUESTIONNAIRES
QUESTION_1 LAST FOUR WEEKS HOW MUCH OF THE TIME DID YOUR ASTHMA KEEP YOU FROM GETTING AS MUCH DONE AT WORK, SCHOOL OR AT HOME: NONE OF THE TIME
QUESTION_4 LAST FOUR WEEKS HOW OFTEN HAVE YOU USED YOUR RESCUE INHALER OR NEBULIZER MEDICATION (SUCH AS ALBUTEROL): ONCE A WEEK OR LESS
QUESTION_2 LAST FOUR WEEKS HOW OFTEN HAVE YOU HAD SHORTNESS OF BREATH: NOT AT ALL
ACT_TOTALSCORE: 24
QUESTION_3 LAST FOUR WEEKS HOW OFTEN DID YOUR ASTHMA SYMPTOMS (WHEEZING, COUGHING, SHORTNESS OF BREATH, CHEST TIGHTNESS OR PAIN) WAKE YOU UP AT NIGHT OR EARLIER THAN USUAL IN THE MORNING: NOT AT ALL
QUESTION_5 LAST FOUR WEEKS HOW WOULD YOU RATE YOUR ASTHMA CONTROL: COMPLETELY CONTROLLED

## 2025-05-20 ENCOUNTER — OFFICE VISIT (OUTPATIENT)
Dept: FAMILY MEDICINE | Facility: OTHER | Age: 65
End: 2025-05-20
Attending: FAMILY MEDICINE
Payer: COMMERCIAL

## 2025-05-20 ENCOUNTER — RESULTS FOLLOW-UP (OUTPATIENT)
Dept: FAMILY MEDICINE | Facility: OTHER | Age: 65
End: 2025-05-20

## 2025-05-20 VITALS
HEART RATE: 78 BPM | WEIGHT: 309 LBS | OXYGEN SATURATION: 94 % | RESPIRATION RATE: 18 BRPM | BODY MASS INDEX: 44.24 KG/M2 | HEIGHT: 70 IN | TEMPERATURE: 97 F

## 2025-05-20 DIAGNOSIS — K21.9 GASTROESOPHAGEAL REFLUX DISEASE, UNSPECIFIED WHETHER ESOPHAGITIS PRESENT: ICD-10-CM

## 2025-05-20 DIAGNOSIS — F33.0 MAJOR DEPRESSIVE DISORDER, RECURRENT EPISODE, MILD: ICD-10-CM

## 2025-05-20 DIAGNOSIS — G60.9 CHRONIC IDIOPATHIC AXONAL POLYNEUROPATHY: ICD-10-CM

## 2025-05-20 DIAGNOSIS — E11.9 TYPE 2 DIABETES MELLITUS WITHOUT COMPLICATION, WITHOUT LONG-TERM CURRENT USE OF INSULIN (H): ICD-10-CM

## 2025-05-20 DIAGNOSIS — Z01.818 PREOP GENERAL PHYSICAL EXAM: Primary | ICD-10-CM

## 2025-05-20 LAB
ALBUMIN SERPL BCG-MCNC: 4.5 G/DL (ref 3.5–5.2)
ALP SERPL-CCNC: 110 U/L (ref 40–150)
ALT SERPL W P-5'-P-CCNC: 46 U/L (ref 0–70)
ANION GAP SERPL CALCULATED.3IONS-SCNC: 16 MMOL/L (ref 7–15)
AST SERPL W P-5'-P-CCNC: 35 U/L (ref 0–45)
BASOPHILS # BLD AUTO: 0.1 10E3/UL (ref 0–0.2)
BASOPHILS NFR BLD AUTO: 1 %
BILIRUB SERPL-MCNC: 0.5 MG/DL
BUN SERPL-MCNC: 13.9 MG/DL (ref 8–23)
CALCIUM SERPL-MCNC: 9.5 MG/DL (ref 8.8–10.4)
CHLORIDE SERPL-SCNC: 103 MMOL/L (ref 98–107)
CREAT SERPL-MCNC: 0.94 MG/DL (ref 0.67–1.17)
EGFRCR SERPLBLD CKD-EPI 2021: >90 ML/MIN/1.73M2
EOSINOPHIL # BLD AUTO: 0.3 10E3/UL (ref 0–0.7)
EOSINOPHIL NFR BLD AUTO: 4 %
ERYTHROCYTE [DISTWIDTH] IN BLOOD BY AUTOMATED COUNT: 12.9 % (ref 10–15)
GLUCOSE SERPL-MCNC: 145 MG/DL (ref 70–99)
HCO3 SERPL-SCNC: 22 MMOL/L (ref 22–29)
HCT VFR BLD AUTO: 44.4 % (ref 40–53)
HGB BLD-MCNC: 15.5 G/DL (ref 13.3–17.7)
IMM GRANULOCYTES # BLD: 0.1 10E3/UL
IMM GRANULOCYTES NFR BLD: 2 %
LYMPHOCYTES # BLD AUTO: 1.9 10E3/UL (ref 0.8–5.3)
LYMPHOCYTES NFR BLD AUTO: 31 %
MCH RBC QN AUTO: 32.2 PG (ref 26.5–33)
MCHC RBC AUTO-ENTMCNC: 34.9 G/DL (ref 31.5–36.5)
MCV RBC AUTO: 92 FL (ref 78–100)
MONOCYTES # BLD AUTO: 0.6 10E3/UL (ref 0–1.3)
MONOCYTES NFR BLD AUTO: 9 %
NEUTROPHILS # BLD AUTO: 3.3 10E3/UL (ref 1.6–8.3)
NEUTROPHILS NFR BLD AUTO: 53 %
NRBC # BLD AUTO: 0 10E3/UL
NRBC BLD AUTO-RTO: 0 /100
PLATELET # BLD AUTO: 192 10E3/UL (ref 150–450)
POTASSIUM SERPL-SCNC: 4.4 MMOL/L (ref 3.4–5.3)
PROT SERPL-MCNC: 7.2 G/DL (ref 6.4–8.3)
RBC # BLD AUTO: 4.81 10E6/UL (ref 4.4–5.9)
SODIUM SERPL-SCNC: 141 MMOL/L (ref 135–145)
WBC # BLD AUTO: 6.2 10E3/UL (ref 4–11)

## 2025-05-20 PROCEDURE — 36415 COLL VENOUS BLD VENIPUNCTURE: CPT | Mod: ZL | Performed by: FAMILY MEDICINE

## 2025-05-20 PROCEDURE — 85004 AUTOMATED DIFF WBC COUNT: CPT | Mod: ZL | Performed by: FAMILY MEDICINE

## 2025-05-20 PROCEDURE — 82310 ASSAY OF CALCIUM: CPT | Mod: ZL | Performed by: FAMILY MEDICINE

## 2025-05-20 ASSESSMENT — PATIENT HEALTH QUESTIONNAIRE - PHQ9
SUM OF ALL RESPONSES TO PHQ QUESTIONS 1-9: 2
10. IF YOU CHECKED OFF ANY PROBLEMS, HOW DIFFICULT HAVE THESE PROBLEMS MADE IT FOR YOU TO DO YOUR WORK, TAKE CARE OF THINGS AT HOME, OR GET ALONG WITH OTHER PEOPLE: NOT DIFFICULT AT ALL
SUM OF ALL RESPONSES TO PHQ QUESTIONS 1-9: 2

## 2025-05-20 ASSESSMENT — PAIN SCALES - GENERAL: PAINLEVEL_OUTOF10: SEVERE PAIN (7)

## 2025-05-20 NOTE — PROGRESS NOTES
Preoperative Evaluation  [facility]  [address]  [place] [address]  Phone: [phone]  Fax: [phone]  Primary Provider: Kaiden Chavez MD  Pre-op Performing Provider: Fabricio Dickens MD  May 20, 2025             5/15/2025   Surgical Information   What procedure is being done? Intrathecal pain pump   Facility or Hospital where procedure/surgery will be performed: [facility]   Who is doing the procedure / surgery? Dr. Parr   Date of surgery / procedure: Carmela 3, 2025   Time of surgery / procedure: 9:00 am   Where do you plan to recover after surgery? at home with family     Fax number for surgical facility: [phone]    Diagnoses and associated orders for this visit:  Preop general physical exam  -     CBC with Platelets & Differential; Future  -     Comprehensive metabolic panel; Future  -     CBC with Platelets & Differential  -     Comprehensive metabolic panel    Chronic idiopathic axonal polyneuropathy  -     CBC with Platelets & Differential; Future  -     Comprehensive metabolic panel; Future  -     CBC with Platelets & Differential  -     Comprehensive metabolic panel    Type 2 diabetes mellitus without complication, without long-term current use of insulin (H)  -     CBC with Platelets & Differential; Future  -     Comprehensive metabolic panel; Future  -     CBC with Platelets & Differential  -     Comprehensive metabolic panel    Gastroesophageal reflux disease, unspecified whether esophagitis present  -     CBC with Platelets & Differential; Future  -     Comprehensive metabolic panel; Future  -     CBC with Platelets & Differential  -     Comprehensive metabolic panel    Major depressive disorder, recurrent episode, mild      Okay to go ahead with planned procedure.  No aspirin or NSAIDs for 5 days prior to the procedure.  He knows when to stop his Victoza and restart it.    Fabricio Dickens MD     Ivett Rajput is a 64 year old, presenting for  the following:  No chief complaint on file.          5/20/2025     9:14 AM   Additional Questions   Roomed by Shalini Benites LPN     HPI: Preoperative risk assessment consultation was requested on [facility] [place] by Dr. Parr prior to placement of an intrathecal pain pump.   This is scheduled for Carmela 3, 2025 at [facility]. I am asked to see this patient for preoperative clearance prior to this procedure.     Patient has a history of complex regional pain syndrome type one of his feet.  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  He had spinal cord stimulator placed a number of years ago providing some relief but he has had to continue opioid pain medications.  He currently uses 90 morphine equivalents daily.  He takes 10 mg of oxycodone IR 6 times a day and continues on Trileptal 150 mg twice daily under a pain medication agreement with Dr. Chavez.  He recently had a battery replacement in his dorsal column stimulator, but has not really had improvement in his pain.  He has been recommended to proceed with an intrathecal pain pump and has had his trial which has been successful.    He has a history of asthma which is well-controlled.  He has type 2 diabetes and is on Victoza and Jardiance.  He is well-controlled with the last hemoglobin A1c of 6.8%.His last EKG was 4/3/2025, and was normal.  He has albuterol inhaler that he uses as needed.  He is on 40 mg of Lipitor daily and on mirtazapine 30 mg at bedtime.  He takes 40 mg of omeprazole for reflux.    He is a former smoker but quit many years ago.  He is up-to-date on vaccines with the exception of RSV.         5/15/2025   Pre-Op Questionnaire   Have you ever had a heart attack or stroke? No   Have you ever had surgery on your heart or blood vessels, such as a stent placement, a coronary artery bypass, or surgery on an artery in your head, neck, heart, or legs? No   Do you have chest pain with activity? No    Do you have a history of heart failure? No   Do you currently have a cold, bronchitis or symptoms of other infection? No   Do you have a cough, shortness of breath, or wheezing? No   Do you or anyone in your family have previous history of blood clots? No   Do you or does anyone in your family have a serious bleeding problem such as prolonged bleeding following surgeries or cuts? No   Have you ever had problems with anemia or been told to take iron pills? No   Have you had any abnormal blood loss such as black, tarry or bloody stools? No   Have you ever had a blood transfusion? No   Are you willing to have a blood transfusion if it is medically needed before, during, or after your surgery? Yes   Have you or any of your relatives ever had problems with anesthesia? No   Do you have sleep apnea, excessive snoring or daytime drowsiness? (!) YES   Do you have a CPAP machine? Yes   Do you have any artifical heart valves or other implanted medical devices like a pacemaker, defibrillator, or continuous glucose monitor? No   Do you have artificial joints? (!) YES   Are you allergic to latex? No     Advance Care Planning    Discussed advance care planning with patient; however, patient declined at this time.    Preoperative Review of    reviewed - controlled substances reflected in medication list.  PDMP Review         Value Time User    State PDMP site checked  Yes 5/20/2025  9:18 AM Fabricio Dickens MD                 Patient Active Problem List    Diagnosis Date Noted    F11.9 - Chronic, continuous use of opioids 04/14/2023     Priority: Medium    Diabetes mellitus, type 2 (H) 07/22/2022     Priority: Medium    Morbid obesity (H) 04/14/2021     Priority: Medium    Hypertriglyceridemia 12/04/2019     Priority: Medium    Benign prostatic hyperplasia with urinary retention 06/27/2019     Priority: Medium    Epidural lipomatosis 08/04/2017     Priority: Medium    Chronic idiopathic axonal polyneuropathy 06/21/2017      Priority: Medium    Neuropathy 06/17/2016     Priority: Medium    Sleep apnea, obstructive 06/30/2015     Priority: Medium    Controlled substance agreement signed 6/27/19 09/04/2014     Priority: Medium     Overview:   Updated 6/3/2016      Pain medication agreement 09/04/2014     Priority: Medium     Formatting of this note might be different from the original.  Updated 6/3/2016      GERD (gastroesophageal reflux disease) 11/25/2013     Priority: Medium    Health care maintenance 11/25/2013     Priority: Medium    Vitamin D deficiency 11/15/2013     Priority: Medium    Paresthesia of bilateral legs 11/11/2013     Priority: Medium    Mild intermittent asthma without complication 02/22/2012     Priority: Medium    Eye irritation 10/06/2011     Priority: Medium    Osteoarthritis, knee 06/22/2011     Priority: Medium    Plantar fascial fibromatosis 06/22/2011     Priority: Medium    Seborrheic dermatitis 06/22/2011     Priority: Medium      Past Medical History:   Diagnosis Date    Dermatitis     No Comments Provided    Gastro-esophageal reflux disease without esophagitis     No Comments Provided    Other seasonal allergic rhinitis     No Comments Provided    Primary osteoarthritis of one knee     No Comments Provided    Uncomplicated asthma     No Comments Provided     Past Surgical History:   Procedure Laterality Date    ARTHROPLASTY KNEE      August 2012,left - became infected require debridment    ARTHROSCOPY KNEE      1981,acl and medial meniscus repair [Other]    COLONOSCOPY  12/09/2013    ,F/U 2023    ESOPHAGOSCOPY, GASTROSCOPY, DUODENOSCOPY (EGD), COMBINED      12/9/13,EGD    FUSION CERVICAL ANTERIOR ONE LEVEL      c6-7    INSERT STIMULATOR AND LEADS INTERNAL DORSAL COLUMN  04/2018    OTHER SURGICAL HISTORY      ,HERNIA REPAIR    OTHER SURGICAL HISTORY      ,HERNIA REPAIR    OTHER SURGICAL HISTORY      2012,205436,DEBRIDEMENT,Left,knee  infection following surgery    OTHER SURGICAL HISTORY       2012,92134.3,NE KNEE MANIPULATION    OTHER SURGICAL HISTORY      206904,MULTIPLE SLEEP LATENCY TEST WITHOUT CPAP,uses C PAP    TONSILLECTOMY, ADENOIDECTOMY, COMBINED      No Comments Provided     Current Outpatient Medications   Medication Sig Dispense Refill    albuterol (PROAIR HFA/PROVENTIL HFA/VENTOLIN HFA) 108 (90 Base) MCG/ACT inhaler Inhale 2 puffs into the lungs every 4 hours as needed for wheezing. 18 g 4    atorvastatin (LIPITOR) 40 MG tablet TAKE 1 TABLET BY MOUTH DAILY 90 tablet 4    empagliflozin (JARDIANCE) 25 MG TABS tablet Take 1 tablet (25 mg) by mouth daily. 90 tablet 4    insulin pen needle (31G X 8 MM) 31G X 8 MM miscellaneous Use 1 pen needles daily or as directed. 100 each 4    liraglutide (VICTOZA) 18 MG/3ML solution Inject 1.2 mg subcutaneously daily. 18 mL 4    mirtazapine (REMERON) 30 MG tablet Take 1 tablet (30 mg) by mouth at bedtime. 90 tablet 4    naloxone (NARCAN) 4 MG/0.1ML nasal spray Spray 1 spray (4 mg) into one nostril alternating nostrils once as needed for opioid reversal every 2-3 minutes until assistance arrives 0.2 mL 3    omeprazole (PRILOSEC) 40 MG DR capsule Take 1 capsule (40 mg) by mouth daily. 90 capsule 4    order for DME CPAP, heated humidifier, mask, headgear, filters and tubing. For home use. Pressure:  8   Length of Need:      OXcarbazepine (TRILEPTAL) 150 MG tablet Take 1 tablet (150 mg) by mouth 2 times daily. 180 tablet 4    [START ON 5/29/2025] oxyCODONE IR (ROXICODONE) 10 MG tablet Take 1 tablet (10 mg) by mouth every 4 hours as needed for severe pain. Max of 6 daily. 180 tablet 0    oxyCODONE IR (ROXICODONE) 10 MG tablet Take 1 tablet (10 mg) by mouth every 4 hours as needed for severe pain. Max 6 daily. 180 tablet 0    oxyCODONE IR (ROXICODONE) 10 MG tablet Take 1 tablet (10 mg) by mouth every 4 hours as needed for severe pain. Max 6 daily, 180 tablet 0       Allergies   Allergen Reactions    Other Environmental Allergy      seasonal        Social History  "    Tobacco Use    Smoking status: Former     Current packs/day: 1.00     Average packs/day: 1 pack/day for 18.0 years (18.0 ttl pk-yrs)     Types: Cigarettes    Smokeless tobacco: Never   Substance Use Topics    Alcohol use: Not Currently     Alcohol/week: 8.3 standard drinks of alcohol     Types: 8 Standard drinks or equivalent per week     Family History   Problem Relation Age of Onset    Other - See Comments Father         Alzheimer's    Cancer Sister         Cancer,Leukemia     History   Drug Use    Types: Marijuana     Comment: sublingual spray-green card             Review of Systems  Constitutional, HEENT, cardiovascular, pulmonary, GI, , musculoskeletal, neuro, skin, endocrine and psych systems are negative, except as otherwise noted.    Objective    There were no vitals taken for this visit.   Estimated body mass index is 43.57 kg/m  as calculated from the following:    Height as of 4/3/25: 1.791 m (5' 10.5\").    Weight as of 4/3/25: 139.7 kg (308 lb).  Physical Exam  General Appearance: Pleasant, alert, appropriate appearance for age. No acute distress.  Obese.  Head Exam: Normal. Normocephalic, atraumatic.  Eye Exam:  Normal external eyes, conjunctivae, lids, cornea. MARILYN. EOMI  Ear Exam: Normal TM's bilaterally. Normal auditory canals and external ears. Non-tender.  Nose Exam: Normal external nose, mucus membranes, and septum.  OroPharynx Exam:  Dental hygiene adequate. Normal buccal mucosa. Normal pharynx.  Neck Exam:  Supple, no masses or nodes. No audible bruits  Thyroid Exam: No nodules or enlargement.  Chest/Respiratory Exam: Normal chest wall and respirations. Clear to auscultation.  Cardiovascular Exam: Regular rate and rhythm. S1, S2, no murmur, click, gallop, or rubs.  Gastrointestinal Exam: Soft, non-tender, no masses or organomegaly.  Lymphatic Exam: Non-palpable nodes in neck, clavicular regions.  Musculoskeletal Exam: Back is straight and non-tender, full ROM of upper and lower " extremities.  Foot Exam: Left and right foot: good pedal pulses  Skin: no rash or abnormalities  Neurologic Exam: Nonfocal, normal gross motor, tone coordination and no tremor.  Psychiatric Exam: Alert and oriented - appropriate affect.     Recent Labs   Lab Test 04/03/25  1055 01/03/25  0954 10/11/24  1121   HGB 16.0  --   --      --   --      --  140   POTASSIUM 4.5  --  4.5   CR 0.96  --  0.94   A1C 6.8* 6.6* 7.6*        Diagnostics  Recent Results (from the past 24 hours)   Comprehensive metabolic panel    Collection Time: 05/20/25  9:38 AM   Result Value Ref Range    Sodium 141 135 - 145 mmol/L    Potassium 4.4 3.4 - 5.3 mmol/L    Carbon Dioxide (CO2) 22 22 - 29 mmol/L    Anion Gap 16 (H) 7 - 15 mmol/L    Urea Nitrogen 13.9 8.0 - 23.0 mg/dL    Creatinine 0.94 0.67 - 1.17 mg/dL    GFR Estimate >90 >60 mL/min/1.73m2    Calcium 9.5 8.8 - 10.4 mg/dL    Chloride 103 98 - 107 mmol/L    Glucose 145 (H) 70 - 99 mg/dL    Alkaline Phosphatase 110 40 - 150 U/L    AST 35 0 - 45 U/L    ALT 46 0 - 70 U/L    Protein Total 7.2 6.4 - 8.3 g/dL    Albumin 4.5 3.5 - 5.2 g/dL    Bilirubin Total 0.5 <=1.2 mg/dL   CBC with platelets and differential    Collection Time: 05/20/25  9:38 AM   Result Value Ref Range    WBC Count 6.2 4.0 - 11.0 10e3/uL    RBC Count 4.81 4.40 - 5.90 10e6/uL    Hemoglobin 15.5 13.3 - 17.7 g/dL    Hematocrit 44.4 40.0 - 53.0 %    MCV 92 78 - 100 fL    MCH 32.2 26.5 - 33.0 pg    MCHC 34.9 31.5 - 36.5 g/dL    RDW 12.9 10.0 - 15.0 %    Platelet Count 192 150 - 450 10e3/uL    % Neutrophils 53 %    % Lymphocytes 31 %    % Monocytes 9 %    % Eosinophils 4 %    % Basophils 1 %    % Immature Granulocytes 2 %    NRBCs per 100 WBC 0 <1 /100    Absolute Neutrophils 3.3 1.6 - 8.3 10e3/uL    Absolute Lymphocytes 1.9 0.8 - 5.3 10e3/uL    Absolute Monocytes 0.6 0.0 - 1.3 10e3/uL    Absolute Eosinophils 0.3 0.0 - 0.7 10e3/uL    Absolute Basophils 0.1 0.0 - 0.2 10e3/uL    Absolute Immature Granulocytes 0.1 <=0.4  10e3/uL    Absolute NRBCs 0.0 10e3/uL      Last EKG 4/5/2025 showing sinus rhythm and was normal    Revised Cardiac Risk Index (RCRI)  The patient has the following serious cardiovascular risks for perioperative complications:   - No serious cardiac risks = 0 points     RCRI Interpretation: 0 points: Class I (very low risk - 0.4% complication rate)       Answers submitted by the patient for this visit:  Patient Health Questionnaire (Submitted on 5/20/2025)  If you checked off any problems, how difficult have these problems made it for you to do your work, take care of things at home, or get along with other people?: Not difficult at all  PHQ9 TOTAL SCORE: 2  Diabetes Visit (Submitted on 5/15/2025)  Chief Complaint: Chronic problems general questions HPI Form  Frequency of checking blood sugars:: not at all  Diabetic concerns:: none  Paraesthesia present:: burning in feet, weight gain  Questionnaire about: Chronic problems general questions HPI Form (Submitted on 5/15/2025)  Chief Complaint: Chronic problems general questions HPI Form    Signed Electronically by: Fabricio Dickens MD  A copy of this evaluation report is provided to the requesting physician.

## 2025-05-20 NOTE — NURSING NOTE
"Chief Complaint   Patient presents with    Pre-Op Exam     Surgery date 06/03/25       Initial Pulse 78   Temp 97  F (36.1  C) (Temporal)   Resp 18   Ht 1.778 m (5' 10\")   Wt (!) 140.2 kg (309 lb)   SpO2 94%   BMI 44.34 kg/m   Estimated body mass index is 44.34 kg/m  as calculated from the following:    Height as of this encounter: 1.778 m (5' 10\").    Weight as of this encounter: 140.2 kg (309 lb).  Medication Review: complete    The next two questions are to help us understand your food security.  If you are feeling you need any assistance in this area, we have resources available to support you today.          2/1/2024   SDOH- Food Insecurity   Within the past 12 months, did you worry that your food would run out before you got money to buy more? N   Within the past 12 months, did the food you bought just not last and you didn t have money to get more? N        Data saved with a previous flowsheet row definition         Health Care Directive:  Patient does not have a Health Care Directive: Discussed advance care planning with patient; however, patient declined at this time.    Shalini Benites, GLENN      " BIBEMS from home c/o having a seizure  which lasted x 3 mins 1 hour ago EMS was on scene given IM versed 5 mg pt arrives AAO x 3 with a right chest port for HD on a m/w/f schedule reports having a full session of HD which ended at 3 pm pt takes keppra BIBEMS from home c/o having a seizure  which lasted x 3 mins 1 hour ago EMS was on scene given IM versed 5 mg pt arrives AAO x 3 with a IV to her left AC and a  right chest port for HD on a m/w/f schedule reports having a full session of HD which ended at 3 pm pt takes keppra

## 2025-05-29 DIAGNOSIS — G60.9 CHRONIC IDIOPATHIC AXONAL POLYNEUROPATHY: ICD-10-CM

## 2025-06-02 NOTE — TELEPHONE ENCOUNTER
[facility] sent Rx request for the following:      Requested Prescriptions   Pending Prescriptions Disp Refills    NARCAN 4 MG/0.1ML nasal spray [Pharmacy Med Name: Narcan 4 mg/actuation nasal spray]  3     Sig: Spray 1 spray (4 mg) into one nostril alternating nostrils once as needed for opioid reversal every 2-3 minutes until assistance arrives       There is no refill protocol information for this order        Last Prescription Date:   8/20/20  Last Fill Qty/Refills:         0.2 ml, R-3    Last Office Visit:              5/20/25 (preop)   Future Office visit:           None    Unable to complete prescription refill per RN Medication Refill Policy. Arianne Meneses, Refill RN .............. 6/2/2025  4:31 PM

## 2025-06-03 RX ORDER — NALOXONE HYDROCHLORIDE 4 MG/.1ML
SPRAY NASAL
Qty: 0.2 ML | Refills: 3 | Status: SHIPPED | OUTPATIENT
Start: 2025-06-03

## 2025-07-02 ASSESSMENT — ASTHMA QUESTIONNAIRES
ACT_TOTALSCORE: 25
QUESTION_1 LAST FOUR WEEKS HOW MUCH OF THE TIME DID YOUR ASTHMA KEEP YOU FROM GETTING AS MUCH DONE AT WORK, SCHOOL OR AT HOME: NONE OF THE TIME
QUESTION_2 LAST FOUR WEEKS HOW OFTEN HAVE YOU HAD SHORTNESS OF BREATH: NOT AT ALL
QUESTION_4 LAST FOUR WEEKS HOW OFTEN HAVE YOU USED YOUR RESCUE INHALER OR NEBULIZER MEDICATION (SUCH AS ALBUTEROL): NOT AT ALL
QUESTION_5 LAST FOUR WEEKS HOW WOULD YOU RATE YOUR ASTHMA CONTROL: COMPLETELY CONTROLLED
QUESTION_3 LAST FOUR WEEKS HOW OFTEN DID YOUR ASTHMA SYMPTOMS (WHEEZING, COUGHING, SHORTNESS OF BREATH, CHEST TIGHTNESS OR PAIN) WAKE YOU UP AT NIGHT OR EARLIER THAN USUAL IN THE MORNING: NOT AT ALL

## 2025-07-03 ENCOUNTER — OFFICE VISIT (OUTPATIENT)
Dept: FAMILY MEDICINE | Facility: OTHER | Age: 65
End: 2025-07-03
Attending: FAMILY MEDICINE
Payer: COMMERCIAL

## 2025-07-03 ENCOUNTER — RESULTS FOLLOW-UP (OUTPATIENT)
Dept: FAMILY MEDICINE | Facility: OTHER | Age: 65
End: 2025-07-03

## 2025-07-03 VITALS
SYSTOLIC BLOOD PRESSURE: 140 MMHG | BODY MASS INDEX: 42.83 KG/M2 | DIASTOLIC BLOOD PRESSURE: 78 MMHG | RESPIRATION RATE: 18 BRPM | HEART RATE: 68 BPM | WEIGHT: 299.2 LBS | TEMPERATURE: 96.8 F | HEIGHT: 70 IN | OXYGEN SATURATION: 95 %

## 2025-07-03 DIAGNOSIS — I10 ESSENTIAL HYPERTENSION: ICD-10-CM

## 2025-07-03 DIAGNOSIS — Z00.00 ROUTINE GENERAL MEDICAL EXAMINATION AT A HEALTH CARE FACILITY: Primary | ICD-10-CM

## 2025-07-03 DIAGNOSIS — G60.9 CHRONIC IDIOPATHIC AXONAL POLYNEUROPATHY: ICD-10-CM

## 2025-07-03 DIAGNOSIS — Z12.5 SCREENING FOR PROSTATE CANCER: ICD-10-CM

## 2025-07-03 DIAGNOSIS — E11.9 TYPE 2 DIABETES MELLITUS WITHOUT COMPLICATION, WITHOUT LONG-TERM CURRENT USE OF INSULIN (H): ICD-10-CM

## 2025-07-03 LAB
EST. AVERAGE GLUCOSE BLD GHB EST-MCNC: 137 MG/DL
HBA1C MFR BLD: 6.4 %
PSA SERPL DL<=0.01 NG/ML-MCNC: 0.63 NG/ML (ref 0–4.5)

## 2025-07-03 PROCEDURE — 3077F SYST BP >= 140 MM HG: CPT | Performed by: FAMILY MEDICINE

## 2025-07-03 PROCEDURE — 1125F AMNT PAIN NOTED PAIN PRSNT: CPT | Performed by: FAMILY MEDICINE

## 2025-07-03 PROCEDURE — 3044F HG A1C LEVEL LT 7.0%: CPT | Performed by: FAMILY MEDICINE

## 2025-07-03 PROCEDURE — G0103 PSA SCREENING: HCPCS | Mod: ZL | Performed by: FAMILY MEDICINE

## 2025-07-03 PROCEDURE — 83036 HEMOGLOBIN GLYCOSYLATED A1C: CPT | Mod: ZL | Performed by: FAMILY MEDICINE

## 2025-07-03 PROCEDURE — 99214 OFFICE O/P EST MOD 30 MIN: CPT | Mod: 25 | Performed by: FAMILY MEDICINE

## 2025-07-03 PROCEDURE — 99396 PREV VISIT EST AGE 40-64: CPT | Performed by: FAMILY MEDICINE

## 2025-07-03 PROCEDURE — 3078F DIAST BP <80 MM HG: CPT | Performed by: FAMILY MEDICINE

## 2025-07-03 PROCEDURE — G2211 COMPLEX E/M VISIT ADD ON: HCPCS | Performed by: FAMILY MEDICINE

## 2025-07-03 PROCEDURE — 36415 COLL VENOUS BLD VENIPUNCTURE: CPT | Mod: ZL | Performed by: FAMILY MEDICINE

## 2025-07-03 RX ORDER — OXYCODONE HYDROCHLORIDE 5 MG/1
5 TABLET ORAL EVERY 4 HOURS PRN
Qty: 180 TABLET | Refills: 0 | Status: SHIPPED | OUTPATIENT
Start: 2025-07-31

## 2025-07-03 RX ORDER — LOSARTAN POTASSIUM 25 MG/1
25 TABLET ORAL DAILY
Qty: 90 TABLET | Refills: 4 | Status: SHIPPED | OUTPATIENT
Start: 2025-07-03

## 2025-07-03 RX ORDER — OXYCODONE HYDROCHLORIDE 5 MG/1
5 TABLET ORAL EVERY 4 HOURS PRN
Qty: 180 TABLET | Refills: 0 | Status: SHIPPED | OUTPATIENT
Start: 2025-07-03

## 2025-07-03 SDOH — HEALTH STABILITY: PHYSICAL HEALTH: ON AVERAGE, HOW MANY DAYS PER WEEK DO YOU ENGAGE IN MODERATE TO STRENUOUS EXERCISE (LIKE A BRISK WALK)?: 0 DAYS

## 2025-07-03 SDOH — HEALTH STABILITY: PHYSICAL HEALTH: ON AVERAGE, HOW MANY MINUTES DO YOU ENGAGE IN EXERCISE AT THIS LEVEL?: 0 MIN

## 2025-07-03 ASSESSMENT — PAIN SCALES - GENERAL: PAINLEVEL_OUTOF10: SEVERE PAIN (7)

## 2025-07-03 ASSESSMENT — SOCIAL DETERMINANTS OF HEALTH (SDOH): HOW OFTEN DO YOU GET TOGETHER WITH FRIENDS OR RELATIVES?: ONCE A WEEK

## 2025-07-03 NOTE — PROGRESS NOTES
"Preventive Care Visit  [facility]  Kaiden Chavez MD, Family Medicine  Jul 3, 2025  {Provider  Link to [facility] :[id number]}    Assessment & Plan     (Z00.00) Routine general medical examination at a health care facility  (primary encounter diagnosis)  Comment: see below  Plan:      (E11.9) Type 2 diabetes mellitus without complication, without long-term current use of insulin (H)  Comment: ***  Plan: HEMOGLOBIN A1C        ***    (G60.9) Chronic idiopathic axonal polyneuropathy  Comment: symptoms improving now with his pain pump. Will continue a slow taper, meds refilled at 5 milligram every 4 hours as needed, instead of 10 milligram.   Plan: oxyCODONE (ROXICODONE) 5 MG tablet, oxyCODONE         (ROXICODONE) 5 MG tablet, oxyCODONE         (ROXICODONE) 5 MG tablet          reviewed, low risk for misuse or abuse    (Z12.5) Screening for prostate cancer  Comment:    Plan: PSA, screen             (I10) Essential hypertension  Comment: new diagnosis. With diabetes target under at least 135/85, but ideally 130/80.  Plan: losartan (COZAAR) 25 MG tablet             The longitudinal plan of care for the diagnosis(es)/condition(s) as documented were addressed during this visit. Due to the added complexity in care, I will continue to support Regulo in the subsequent management and with ongoing continuity of care.      {Patient advised of split billing (Optional):853125}        BMI  Estimated body mass index is 42.93 kg/m  as calculated from the following:    Height as of this encounter: 1.778 m (5' 10\").    Weight as of this encounter: 135.7 kg (299 lb 3.2 oz).   {Weight Management Plan needed for ACO:458362}  {MALE COUNSELING MESSAGES:825416::\"Reviewed preventive health counseling, as reflected in patient instructions\"}  Counseling  Appropriate preventive services were addressed with this patient via screening, questionnaire, or discussion as appropriate for fall prevention, nutrition, physical activity, " "Tobacco-use cessation, social engagement, weight loss and cognition.  Checklist reviewing preventive services available has been given to the patient.  Reviewed patient's diet, addressing concerns and/or questions.   He is at risk for psychosocial distress and has been provided with information to reduce risk.         {Follow-up (Optional):254457}    Ivett Rajput is a 64 year old, presenting for the following:  Physical        7/3/2025    11:20 AM   Additional Questions   Roomed by GLENN Rocha   Accompanied by Self         7/3/2025    11:20 AM   Patient Reported Additional Medications   Patient reports taking the following new medications N/A   {ROOMER positive Fall Risk- Gait Speed Test is required click here to document the Gait Speed Test and then refresh the note to pull in results  :334473}      Healthy Habits:     Taking medications regularly:  0  History of Present Illness       Reason for visit:  Neuropathy medications    He eats 4 or more servings of fruits and vegetables daily.He consumes 0 sweetened beverage(s) daily.He exercises with enough effort to increase his heart rate 10 to 19 minutes per day.  He exercises with enough effort to increase his heart rate 4 days per week.   He is taking medications regularly.    He now has an intrathecal pain pump. Getting weekly follow up visits with the goal of coming off oxycodone. He is now down to 5 milligram 6 times a day on oxycodone. He finds the pump works really well. Foot pain will improve within about 10 minutes of a bolus. Is on fentanyl. No longer feels like his feet are \"in a pot of boiling water\" but still feels like he is walking on rocks often.       {SUPERLIST (Optional):669968}  {additonal problems for provider to add (Optional):616427}  Advance Care Planning  {The storyboard will display whether the patient has ACP docs on file. Hover over the Code section in the storyboard to access the ACP documents. :807896}  Patient states has Health " Care Directive and will send to [facility].        7/3/2025   General Health   How would you rate your overall physical health? (!) FAIR   Feel stress (tense, anxious, or unable to sleep) Very much   (!) STRESS CONCERN      7/3/2025   Nutrition   Three or more servings of calcium each day? Yes   Diet: Regular (no restrictions)   How many servings of fruit and vegetables per day? (!) 2-3   How many sweetened beverages each day? 0-1         7/3/2025   Exercise   Days per week of moderate/strenous exercise 0 days   Average minutes spent exercising at this level 0 min   (!) EXERCISE CONCERN      7/3/2025   Social Factors   Frequency of gathering with friends or relatives Once a week   Worry food won't last until get money to buy more No   Food not last or not have enough money for food? No   Do you have housing? (Housing is defined as stable permanent housing and does not include staying outside in a car, in a tent, in an abandoned building, in an overnight shelter, or couch-surfing.) Yes   Are you worried about losing your housing? No   Lack of transportation? No   Unable to get utilities (heat,electricity)? No         7/3/2025   Fall Risk   Fallen 2 or more times in the past year? No    Trouble with walking or balance? Yes        Proxy-reported     {Positive Fall Risk- Gait Speed Test is required and was not documented before note was started.  If results do not appear, ask staff to complete.  Once completed, refresh note to pull in results Click here to link Gait Speed Test  :311437}      7/3/2025   Dental   Dentist two times every year? Yes       { Rooming Staff Patient needs a PHQ as part of the AWV.  Use this link to complete and then refresh the note to pull results Link to PHQ9 Assessment :803496}  Today's PHQ-9 Score:       5/20/2025     8:53 AM   PHQ-9 SCORE   PHQ-9 Total Score MyChart 2 (Minimal depression)   PHQ-9 Total Score 2        Patient-reported           7/3/2025   Substance Use   Alcohol more  than 3/day or more than 7/wk No   Do you use any other substances recreationally? (!) CANNABIS PRODUCTS     Social History     Tobacco Use    Smoking status: Former     Current packs/day: 1.00     Average packs/day: 1 pack/day for 18.0 years (18.0 ttl pk-yrs)     Types: Cigarettes     Passive exposure: Past    Smokeless tobacco: Never   Vaping Use    Vaping status: Never Used   Substance Use Topics    Alcohol use: Not Currently     Alcohol/week: 8.3 standard drinks of alcohol     Types: 8 Standard drinks or equivalent per week    Drug use: Yes     Types: Marijuana     Comment: sublingual spray-green card     {Provider  If there are gaps in the social history shown above, please follow the link to update and then refresh the note Link to Social and Substance History :495336}      7/3/2025   STI Screening   New sexual partner(s) since last STI/HIV test? No   Last PSA:   Prostate Specific Antigen Screen   Date Value Ref Range Status   11/02/2021 0.39 0.00 - 4.00 ug/L Final     ASCVD Risk   The 10-year ASCVD risk score (Kaylie HIGGINS, et al., 2019) is: 33.2%    Values used to calculate the score:      Age: 64 years      Sex: Male      Is Non- : No      Diabetic: Yes      Tobacco smoker: No      Systolic Blood Pressure: 146 mmHg      Is BP treated: No      HDL Cholesterol: 37 mg/dL      Total Cholesterol: 243 mg/dL    {Link to Fracture Risk Assessment Tool (Optional):608515}    {Provider  REQUIRED FOR AWV Use the storyboard to review patient history, after sections have been marked as reviewed, refresh note to capture documentation:041435}   Reviewed and updated as needed this visit by Provider                    Past Medical History:   Diagnosis Date    Dermatitis     No Comments Provided    Gastro-esophageal reflux disease without esophagitis     No Comments Provided    Other seasonal allergic rhinitis     No Comments Provided    Primary osteoarthritis of one knee     No Comments Provided     Uncomplicated asthma     No Comments Provided     Past Surgical History:   Procedure Laterality Date    ARTHROPLASTY KNEE      August 2012,left - became infected require debridment    ARTHROSCOPY KNEE      1981,acl and medial meniscus repair [Other]    COLONOSCOPY  12/09/2013    ,F/U 2023    ESOPHAGOSCOPY, GASTROSCOPY, DUODENOSCOPY (EGD), COMBINED      12/9/13,EGD    FUSION CERVICAL ANTERIOR ONE LEVEL      c6-7    INSERT STIMULATOR AND LEADS INTERNAL DORSAL COLUMN  04/2018    OTHER SURGICAL HISTORY      ,HERNIA REPAIR    OTHER SURGICAL HISTORY      ,HERNIA REPAIR    OTHER SURGICAL HISTORY      2012,205436,DEBRIDEMENT,Left,knee  infection following surgery    OTHER SURGICAL HISTORY      2012,27599.3,LA KNEE MANIPULATION    OTHER SURGICAL HISTORY      206904,MULTIPLE SLEEP LATENCY TEST WITHOUT CPAP,uses C PAP    TONSILLECTOMY, ADENOIDECTOMY, COMBINED      No Comments Provided     Current Outpatient Medications   Medication Sig Dispense Refill    albuterol (PROAIR HFA/PROVENTIL HFA/VENTOLIN HFA) 108 (90 Base) MCG/ACT inhaler Inhale 2 puffs into the lungs every 4 hours as needed for wheezing. 18 g 4    atorvastatin (LIPITOR) 40 MG tablet TAKE 1 TABLET BY MOUTH DAILY 90 tablet 4    empagliflozin (JARDIANCE) 25 MG TABS tablet Take 1 tablet (25 mg) by mouth daily. 90 tablet 4    insulin pen needle (31G X 8 MM) 31G X 8 MM miscellaneous Use 1 pen needles daily or as directed. 100 each 4    liraglutide (VICTOZA) 18 MG/3ML solution Inject 1.2 mg subcutaneously daily. 18 mL 4    mirtazapine (REMERON) 30 MG tablet Take 1 tablet (30 mg) by mouth at bedtime. 90 tablet 4    NARCAN 4 MG/0.1ML nasal spray Spray 1 spray (4 mg) into one nostril alternating nostrils once as needed for opioid reversal every 2-3 minutes until assistance arrives 0.2 mL 3    omeprazole (PRILOSEC) 40 MG DR capsule Take 1 capsule (40 mg) by mouth daily. 90 capsule 4    order for DME CPAP, heated humidifier, mask, headgear, filters and tubing. For  "home use. Pressure:  8   Length of Need:      OXcarbazepine (TRILEPTAL) 150 MG tablet Take 1 tablet (150 mg) by mouth 2 times daily. 180 tablet 4    oxyCODONE IR (ROXICODONE) 10 MG tablet Take 1 tablet (10 mg) by mouth every 4 hours as needed for severe pain. Max of 6 daily. 180 tablet 0    oxyCODONE IR (ROXICODONE) 10 MG tablet Take 1 tablet (10 mg) by mouth every 4 hours as needed for severe pain. Max 6 daily. 180 tablet 0    oxyCODONE IR (ROXICODONE) 10 MG tablet Take 1 tablet (10 mg) by mouth every 4 hours as needed for severe pain. Max 6 daily, 180 tablet 0     Allergies   Allergen Reactions    Other Environmental Allergy      seasonal       {ROS Picklists (Optional):610224}     Objective    Exam  BP (!) 146/88   Pulse 68   Temp 96.8  F (36  C) (Tympanic)   Resp 18   Ht 1.778 m (5' 10\")   Wt 135.7 kg (299 lb 3.2 oz)   SpO2 95%   BMI 42.93 kg/m     Estimated body mass index is 42.93 kg/m  as calculated from the following:    Height as of this encounter: 1.778 m (5' 10\").    Weight as of this encounter: 135.7 kg (299 lb 3.2 oz).    Physical Exam  GENERAL: alert and no distress  EYES: Eyes grossly normal to inspection, PERRL and conjunctivae and sclerae normal  HENT: ear canals and TM's normal, nose and mouth without ulcers or lesions  NECK: no adenopathy, no asymmetry, masses, or scars  RESP: lungs clear to auscultation - no rales, rhonchi or wheezes  CV: regular rate and rhythm, normal S1 S2, no S3 or S4, no murmur, click or rub, no peripheral edema  ABDOMEN: soft, nontender, no hepatosplenomegaly, no masses and bowel sounds normal  MS: no gross musculoskeletal defects noted, no edema  SKIN: no suspicious lesions or rashes  NEURO: Normal strength and tone, mentation intact and speech normal  PSYCH: mentation appears normal, affect normal/bright    ***    Signed Electronically by: Kaiden Chavez MD  {Email feedback regarding this note to [email or web address]   :004100}  "

## 2025-07-03 NOTE — LETTER

## 2025-07-03 NOTE — PATIENT INSTRUCTIONS
Patient Education   Preventive Care Advice   This is general advice given by our system to help you stay healthy. However, your care team may have specific advice just for you. Please talk to your care team about your preventive care needs.  Nutrition  Eat 5 or more servings of fruits and vegetables each day.  Try wheat bread, brown rice and whole grain pasta (instead of white bread, rice, and pasta).  Get enough calcium and vitamin D. Check the label on foods and aim for 100% of the RDA (recommended daily allowance).  Lifestyle  Exercise at least 150 minutes each week  (30 minutes a day, 5 days a week).  Do muscle strengthening activities 2 days a week. These help control your weight and prevent disease.  No smoking.  Wear sunscreen to prevent skin cancer.  Have a dental exam and cleaning every 6 months.  Yearly exams  See your health care team every year to talk about:  Any changes in your health.  Any medicines your care team has prescribed.  Preventive care, family planning, and ways to prevent chronic diseases.  Shots (vaccines)   HPV shots (up to age 26), if you've never had them before.  Hepatitis B shots (up to age 59), if you've never had them before.  COVID-19 shot: Get this shot when it's due.  Flu shot: Get a flu shot every year.  Tetanus shot: Get a tetanus shot every 10 years.  Pneumococcal, hepatitis A, and RSV shots: Ask your care team if you need these based on your risk.  Shingles shot (for age 50 and up)  General health tests  Diabetes screening:  Starting at age 35, Get screened for diabetes at least every 3 years.  If you are younger than age 35, ask your care team if you should be screened for diabetes.  Cholesterol test: At age 39, start having a cholesterol test every 5 years, or more often if advised.  Bone density scan (DEXA): At age 50, ask your care team if you should have this scan for osteoporosis (brittle bones).  Hepatitis C: Get tested at least once in your life.  STIs (sexually  transmitted infections)  Before age 24: Ask your care team if you should be screened for STIs.  After age 24: Get screened for STIs if you're at risk. You are at risk for STIs (including HIV) if:  You are sexually active with more than one person.  You don't use condoms every time.  You or a partner was diagnosed with a sexually transmitted infection.  If you are at risk for HIV, ask about PrEP medicine to prevent HIV.  Get tested for HIV at least once in your life, whether you are at risk for HIV or not.  Cancer screening tests  Cervical cancer screening: If you have a cervix, begin getting regular cervical cancer screening tests starting at age 21.  Breast cancer scan (mammogram): If you've ever had breasts, begin having regular mammograms starting at age 40. This is a scan to check for breast cancer.  Colon cancer screening: It is important to start screening for colon cancer at age 45.  Have a colonoscopy test every 10 years (or more often if you're at risk) Or, ask your provider about stool tests like a FIT test every year or Cologuard test every 3 years.  To learn more about your testing options, visit:   .  For help making a decision, visit:   https://bit.ly/ub51724.  Prostate cancer screening test: If you have a prostate, ask your care team if a prostate cancer screening test (PSA) at age 55 is right for you.  Lung cancer screening: If you are a current or former smoker ages 50 to 80, ask your care team if ongoing lung cancer screenings are right for you.  For informational purposes only. Not to replace the advice of your health care provider. Copyright   2023 [facility]. All rights reserved. Clinically reviewed by the [facility]itions [facility]. [employer] [id number] - REV 01/24.  Preventing Falls: Care Instructions  Injuries and health problems such as trouble walking or poor eyesight can increase your risk of falling. So can some medicines. But there are things you can do to help  "prevent falls. You can exercise to get stronger. You can also arrange your home to make it safer.    Talk to your doctor about the medicines you take. Ask if any of them increase the risk of falls and whether they can be changed or stopped.   Try to exercise regularly. It can help improve your strength and balance. This can help lower your risk of falling.         Practice fall safety and prevention.   Wear low-heeled shoes that fit well and give your feet good support. Talk to your doctor if you have foot problems that make this hard.  Carry a cellphone or wear a medical alert device that you can use to call for help.  Use stepladders instead of chairs to reach high objects. Don't climb if you're at risk for falls. Ask for help, if needed.  Wear the correct eyeglasses, if you need them.        Make your home safer.   Remove rugs, cords, clutter, and furniture from walkways.  Keep your house well lit. Use night-lights in hallways and bathrooms.  Install and use sturdy handrails on stairways.  Wear nonskid footwear, even inside. Don't walk barefoot or in socks without shoes.        Be safe outside.   Use handrails, curb cuts, and ramps whenever possible.  Keep your hands free by using a shoulder bag or backpack.  Try to walk in well-lit areas. Watch out for uneven ground, changes in pavement, and debris.  Be careful in the winter. Walk on the grass or gravel when sidewalks are slippery. Use de-icer on steps and walkways. Add non-slip devices to shoes.    Put grab bars and nonskid mats in your shower or tub and near the toilet. Try to use a shower chair or bath bench when bathing.   Get into a tub or shower by putting in your weaker leg first. Get out with your strong side first. Have a phone or medical alert device in the bathroom with you.   Where can you learn more?  Go to https://www.[employer].net/patiented  Enter G117 in the search box to learn more about \"Preventing Falls: Care Instructions.\"  Current as of: " July 31, 2024  Content Version: 14.5    9902-1748 [employer].   Care instructions adapted under license by your healthcare professional. If you have questions about a medical condition or this instruction, always ask your healthcare professional. [employer] disclaims any warranty or liability for your use of this information.    Learning About Stress  What is stress?     Stress is your body's response to a hard situation. Your body can have a physical, emotional, or mental response. Stress is a fact of life for most people, and it affects everyone differently. What causes stress for you may not be stressful for someone else.  A lot of things can cause stress. You may feel stress when you go on a job interview, take a test, or run a race. This kind of short-term stress is normal and even useful. It can help you if you need to work hard or react quickly. For example, stress can help you finish an important job on time.  Long-term stress is caused by ongoing stressful situations or events. Examples of long-term stress include long-term health problems, ongoing problems at work, or conflicts in your family. Long-term stress can harm your health.  How does stress affect your health?  When you are stressed, your body responds as though you are in danger. It makes hormones that speed up your heart, make you breathe faster, and give you a burst of energy. This is called the fight-or-flight stress response. If the stress is over quickly, your body goes back to normal and no harm is done.  But if stress happens too often or lasts too long, it can have bad effects. Long-term stress can make you more likely to get sick, and it can make symptoms of some diseases worse. If you tense up when you are stressed, you may develop neck, shoulder, or low back pain. Stress is linked to high blood pressure and heart disease.  Stress also harms your emotional health. It can make you yun, tense, or depressed. Your  relationships may suffer, and you may not do well at work or school.  What can you do to manage stress?  You can try these things to help manage stress:   Do something active. Exercise or activity can help reduce stress. Walking is a great way to get started. Even everyday activities such as housecleaning or yard work can help.  Try yoga or ila chi. These techniques combine exercise and meditation. You may need some training at first to learn them.  Do something you enjoy. For example, listen to music or go to a movie. Practice your hobby or do volunteer work.  Meditate. This can help you relax, because you are not worrying about what happened before or what may happen in the future.  Do guided imagery. Imagine yourself in any setting that helps you feel calm. You can use online videos, books, or a teacher to guide you.  Do breathing exercises. For example:  From a standing position, bend forward from the waist with your knees slightly bent. Let your arms dangle close to the floor.  Breathe in slowly and deeply as you return to a standing position. Roll up slowly and lift your head last.  Hold your breath for just a few seconds in the standing position.  Breathe out slowly and bend forward from the waist.  Let your feelings out. Talk, laugh, cry, and express anger when you need to. Talking with supportive friends or family, a counselor, or a rhea leader about your feelings is a healthy way to relieve stress. Avoid discussing your feelings with people who make you feel worse.  Write. It may help to write about things that are bothering you. This helps you find out how much stress you feel and what is causing it. When you know this, you can find better ways to cope.  What can you do to prevent stress?  You might try some of these things to help prevent stress:  Manage your time. This helps you find time to do the things you want and need to do.  Get enough sleep. Your body recovers from the stresses of the day while  "you are sleeping.  Get support. Your family, friends, and community can make a difference in how you experience stress.  Limit your news feed. Avoid or limit time on social media or news that may make you feel stressed.  Do something active. Exercise or activity can help reduce stress. Walking is a great way to get started.  Where can you learn more?  Go to https://www.[employer].net/patiented  Enter N032 in the search box to learn more about \"Learning About Stress.\"  Current as of: October 24, 2024  Content Version: 14.5    0479-8406 [employer].   Care instructions adapted under license by your healthcare professional. If you have questions about a medical condition or this instruction, always ask your healthcare professional. [employer] disclaims any warranty or liability for your use of this information.    Substance Use Disorder: Care Instructions  Overview     You can improve your life and health by stopping your use of alcohol or drugs. When you don't drink or use drugs, you may feel and sleep better. You may get along better with your family, friends, and coworkers. There are medicines and programs that can help with substance use disorder.  How can you care for yourself at home?  Here are some ways to help you stay sober and prevent relapse.  If you have been given medicine to help keep you sober or reduce your cravings, be sure to take it exactly as prescribed.  Talk to your doctor about programs that can help you stop using drugs or drinking alcohol.  Do not keep alcohol or drugs in your home.  Plan ahead. Think about what you'll say if other people ask you to drink or use drugs. Try not to spend time with people who drink or use drugs.  Use the time and money spent on drinking or drugs to do something that's important to you.  Preventing a relapse  Have a plan to deal with relapse. Learn to recognize changes in your thinking that lead you to drink or use drugs. Get help before you " start to drink or use drugs again.  Try to stay away from situations, friends, or places that may lead you to drink or use drugs.  If you feel the need to drink alcohol or use drugs again, seek help right away. Call a trusted friend or family member. Some people get support from organizations such as Narcotics Anonymous or [employer] or from treatment facilities.  If you relapse, get help as soon as you can. Some people make a plan with another person that outlines what they want that person to do for them if they relapse. The plan usually includes how to handle the relapse and who to notify in case of relapse.  Don't give up. Remember that a relapse doesn't mean that you have failed. Use the experience to learn the triggers that lead you to drink or use drugs. Then quit again. Recovery is a lifelong process. Many people have several relapses before they are able to quit for good.  Follow-up care is a key part of your treatment and safety. Be sure to make and go to all appointments, and call your doctor if you are having problems. It's also a good idea to know your test results and keep a list of the medicines you take.  When should you call for help?   Call 911  anytime you think you may need emergency care. For example, call if you or someone else:    Has overdosed or has withdrawal signs. Be sure to tell the emergency workers that you are or someone else is using or trying to quit using drugs. Overdose or withdrawal signs may include:  Losing consciousness.  Seizure.  Seeing or hearing things that aren't there (hallucinations).     Is thinking or talking about suicide or harming others.   Where to get help 24 hours a day, 7 days a week   If you or someone you know talks about suicide, self-harm, a mental health crisis, a substance use crisis, or any other kind of emotional distress, get help right away. You can:    Call the Suicide and Crisis Lifeline at 957.     Call 5-136-393-TALK (1-863.335.2956).     Text  "HOME to 228222 to access the Crisis Text Line.   Consider saving these numbers in your phone.  Go to [employer] for more information or to chat online.  Call your doctor now or seek immediate medical care if:    You are having withdrawal symptoms. These may include nausea or vomiting, sweating, shakiness, and anxiety.   Watch closely for changes in your health, and be sure to contact your doctor if:    You have a relapse.     You need more help or support to stop.   Where can you learn more?  Go to https://www.[employer].net/patiented  Enter H573 in the search box to learn more about \"Substance Use Disorder: Care Instructions.\"  Current as of: August 20, 2024  Content Version: 14.5 2024-2025 [employer].   Care instructions adapted under license by your healthcare professional. If you have questions about a medical condition or this instruction, always ask your healthcare professional. [employer] disclaims any warranty or liability for your use of this information.       "

## 2025-07-03 NOTE — NURSING NOTE
"Chief Complaint   Patient presents with    Physical       Initial BP (!) 146/88   Pulse 68   Temp 96.8  F (36  C) (Tympanic)   Resp 18   Ht 1.778 m (5' 10\")   Wt 135.7 kg (299 lb 3.2 oz)   SpO2 95%   BMI 42.93 kg/m   Estimated body mass index is 42.93 kg/m  as calculated from the following:    Height as of this encounter: 1.778 m (5' 10\").    Weight as of this encounter: 135.7 kg (299 lb 3.2 oz).  Medication Reconciliation: complete    Josefina Prater LPN      "

## (undated) RX ORDER — LIDOCAINE HYDROCHLORIDE 10 MG/ML
INJECTION, SOLUTION EPIDURAL; INFILTRATION; INTRACAUDAL; PERINEURAL
Status: DISPENSED
Start: 2022-07-22

## (undated) RX ORDER — CEFTRIAXONE SODIUM 1 G
VIAL (EA) INJECTION
Status: DISPENSED
Start: 2022-07-22